# Patient Record
Sex: MALE | Race: WHITE | NOT HISPANIC OR LATINO | URBAN - METROPOLITAN AREA
[De-identification: names, ages, dates, MRNs, and addresses within clinical notes are randomized per-mention and may not be internally consistent; named-entity substitution may affect disease eponyms.]

---

## 2017-11-19 ENCOUNTER — INPATIENT (INPATIENT)
Facility: HOSPITAL | Age: 42
LOS: 2 days | Discharge: HOME | DRG: 440 | End: 2017-11-22
Attending: HOSPITALIST | Admitting: HOSPITALIST
Payer: COMMERCIAL

## 2017-11-19 VITALS
OXYGEN SATURATION: 100 % | SYSTOLIC BLOOD PRESSURE: 125 MMHG | HEART RATE: 61 BPM | RESPIRATION RATE: 20 BRPM | TEMPERATURE: 98 F | DIASTOLIC BLOOD PRESSURE: 94 MMHG

## 2017-11-19 DIAGNOSIS — K85.90 ACUTE PANCREATITIS WITHOUT NECROSIS OR INFECTION, UNSPECIFIED: ICD-10-CM

## 2017-11-19 LAB
ALBUMIN SERPL ELPH-MCNC: 3.9 G/DL — SIGNIFICANT CHANGE UP (ref 3.5–5)
ALP SERPL-CCNC: 70 U/L — SIGNIFICANT CHANGE UP (ref 40–120)
ALT FLD-CCNC: 66 U/L DA — HIGH (ref 10–60)
ANION GAP SERPL CALC-SCNC: 9 MMOL/L — SIGNIFICANT CHANGE UP (ref 5–17)
AST SERPL-CCNC: 31 U/L — SIGNIFICANT CHANGE UP (ref 10–40)
BILIRUB SERPL-MCNC: 0.5 MG/DL — SIGNIFICANT CHANGE UP (ref 0.2–1.2)
BUN SERPL-MCNC: 10 MG/DL — SIGNIFICANT CHANGE UP (ref 7–18)
CALCIUM SERPL-MCNC: 9 MG/DL — SIGNIFICANT CHANGE UP (ref 8.4–10.5)
CHLORIDE SERPL-SCNC: 106 MMOL/L — SIGNIFICANT CHANGE UP (ref 96–108)
CO2 SERPL-SCNC: 26 MMOL/L — SIGNIFICANT CHANGE UP (ref 22–31)
CREAT SERPL-MCNC: 0.98 MG/DL — SIGNIFICANT CHANGE UP (ref 0.5–1.3)
GLUCOSE SERPL-MCNC: 103 MG/DL — HIGH (ref 70–99)
HCT VFR BLD CALC: 55.2 % — HIGH (ref 39–50)
HGB BLD-MCNC: 18.1 G/DL — HIGH (ref 13–17)
LIDOCAIN IGE QN: HIGH U/L (ref 73–393)
LYMPHOCYTES # BLD AUTO: 13 % — SIGNIFICANT CHANGE UP (ref 13–44)
LYMPHOCYTES # SPEC AUTO: 1 % — HIGH (ref 0–0)
MCHC RBC-ENTMCNC: 28.6 PG — SIGNIFICANT CHANGE UP (ref 27–34)
MCHC RBC-ENTMCNC: 32.8 GM/DL — SIGNIFICANT CHANGE UP (ref 32–36)
MCV RBC AUTO: 87.1 FL — SIGNIFICANT CHANGE UP (ref 80–100)
MONOCYTES NFR BLD AUTO: 2 % — SIGNIFICANT CHANGE UP (ref 2–14)
NEUTROPHILS NFR BLD AUTO: 78 % — HIGH (ref 43–77)
NEUTS BAND # BLD: 3 % — SIGNIFICANT CHANGE UP (ref 0–8)
PLAT MORPH BLD: NORMAL — SIGNIFICANT CHANGE UP
PLATELET # BLD AUTO: 275 K/UL — SIGNIFICANT CHANGE UP (ref 150–400)
POTASSIUM SERPL-MCNC: 4.1 MMOL/L — SIGNIFICANT CHANGE UP (ref 3.5–5.3)
POTASSIUM SERPL-SCNC: 4.1 MMOL/L — SIGNIFICANT CHANGE UP (ref 3.5–5.3)
PROT SERPL-MCNC: 7.4 G/DL — SIGNIFICANT CHANGE UP (ref 6–8.3)
RBC # BLD: 6.34 M/UL — HIGH (ref 4.2–5.8)
RBC # FLD: 14.6 % — HIGH (ref 10.3–14.5)
RBC BLD AUTO: NORMAL — SIGNIFICANT CHANGE UP
SODIUM SERPL-SCNC: 141 MMOL/L — SIGNIFICANT CHANGE UP (ref 135–145)
VARIANT LYMPHS # BLD: 3 % — SIGNIFICANT CHANGE UP (ref 0–6)
WBC # BLD: 18.1 K/UL — HIGH (ref 3.8–10.5)
WBC # FLD AUTO: 18.1 K/UL — HIGH (ref 3.8–10.5)

## 2017-11-19 PROCEDURE — 71020: CPT | Mod: 26

## 2017-11-19 PROCEDURE — 74177 CT ABD & PELVIS W/CONTRAST: CPT | Mod: 26

## 2017-11-19 PROCEDURE — 99285 EMERGENCY DEPT VISIT HI MDM: CPT

## 2017-11-19 RX ORDER — HYDROMORPHONE HYDROCHLORIDE 2 MG/ML
1 INJECTION INTRAMUSCULAR; INTRAVENOUS; SUBCUTANEOUS ONCE
Qty: 0 | Refills: 0 | Status: DISCONTINUED | OUTPATIENT
Start: 2017-11-19 | End: 2017-11-19

## 2017-11-19 RX ORDER — SODIUM CHLORIDE 9 MG/ML
1000 INJECTION INTRAMUSCULAR; INTRAVENOUS; SUBCUTANEOUS ONCE
Qty: 0 | Refills: 0 | Status: COMPLETED | OUTPATIENT
Start: 2017-11-19 | End: 2017-11-20

## 2017-11-19 RX ORDER — SODIUM CHLORIDE 9 MG/ML
2000 INJECTION INTRAMUSCULAR; INTRAVENOUS; SUBCUTANEOUS ONCE
Qty: 0 | Refills: 0 | Status: COMPLETED | OUTPATIENT
Start: 2017-11-19 | End: 2017-11-19

## 2017-11-19 RX ADMIN — HYDROMORPHONE HYDROCHLORIDE 1 MILLIGRAM(S): 2 INJECTION INTRAMUSCULAR; INTRAVENOUS; SUBCUTANEOUS at 20:33

## 2017-11-19 RX ADMIN — SODIUM CHLORIDE 2000 MILLILITER(S): 9 INJECTION INTRAMUSCULAR; INTRAVENOUS; SUBCUTANEOUS at 20:26

## 2017-11-19 RX ADMIN — HYDROMORPHONE HYDROCHLORIDE 1 MILLIGRAM(S): 2 INJECTION INTRAMUSCULAR; INTRAVENOUS; SUBCUTANEOUS at 21:40

## 2017-11-19 RX ADMIN — HYDROMORPHONE HYDROCHLORIDE 1 MILLIGRAM(S): 2 INJECTION INTRAMUSCULAR; INTRAVENOUS; SUBCUTANEOUS at 23:44

## 2017-11-19 RX ADMIN — HYDROMORPHONE HYDROCHLORIDE 1 MILLIGRAM(S): 2 INJECTION INTRAMUSCULAR; INTRAVENOUS; SUBCUTANEOUS at 20:25

## 2017-11-19 RX ADMIN — HYDROMORPHONE HYDROCHLORIDE 1 MILLIGRAM(S): 2 INJECTION INTRAMUSCULAR; INTRAVENOUS; SUBCUTANEOUS at 21:08

## 2017-11-19 NOTE — ED PROVIDER NOTE - CARE PLAN
Principal Discharge DX:	Acute pancreatitis without infection or necrosis, unspecified pancreatitis type

## 2017-11-19 NOTE — ED PROVIDER NOTE - OBJECTIVE STATEMENT
43 y/o male with PMHx of asthma, anxiety, acute pancreatitis presents to the ED c/o abd pain x today. Pt notes he has been having pancreatitis attacks for the past 10 years now, last episode x 3 years. Pt notes he had a light attack this morning while eating, causing him mid abd pain radiating to the back. Pt denies fever, chills, or any other complaints. Last CT scan x 3 years. NKDA.

## 2017-11-20 ENCOUNTER — TRANSCRIPTION ENCOUNTER (OUTPATIENT)
Age: 42
End: 2017-11-20

## 2017-11-20 DIAGNOSIS — K85.90 ACUTE PANCREATITIS WITHOUT NECROSIS OR INFECTION, UNSPECIFIED: ICD-10-CM

## 2017-11-20 DIAGNOSIS — Z29.9 ENCOUNTER FOR PROPHYLACTIC MEASURES, UNSPECIFIED: ICD-10-CM

## 2017-11-20 DIAGNOSIS — F17.200 NICOTINE DEPENDENCE, UNSPECIFIED, UNCOMPLICATED: ICD-10-CM

## 2017-11-20 DIAGNOSIS — M06.9 RHEUMATOID ARTHRITIS, UNSPECIFIED: ICD-10-CM

## 2017-11-20 LAB
24R-OH-CALCIDIOL SERPL-MCNC: 21.7 NG/ML — LOW (ref 30–80)
ALBUMIN SERPL ELPH-MCNC: 3.4 G/DL — LOW (ref 3.5–5)
ALP SERPL-CCNC: 57 U/L — SIGNIFICANT CHANGE UP (ref 40–120)
ALT FLD-CCNC: 47 U/L DA — SIGNIFICANT CHANGE UP (ref 10–60)
AMPHET UR-MCNC: NEGATIVE — SIGNIFICANT CHANGE UP
ANION GAP SERPL CALC-SCNC: 11 MMOL/L — SIGNIFICANT CHANGE UP (ref 5–17)
APPEARANCE UR: CLEAR — SIGNIFICANT CHANGE UP
AST SERPL-CCNC: 19 U/L — SIGNIFICANT CHANGE UP (ref 10–40)
BARBITURATES UR SCN-MCNC: NEGATIVE — SIGNIFICANT CHANGE UP
BASOPHILS # BLD AUTO: 0.1 K/UL — SIGNIFICANT CHANGE UP (ref 0–0.2)
BASOPHILS NFR BLD AUTO: 0.7 % — SIGNIFICANT CHANGE UP (ref 0–2)
BENZODIAZ UR-MCNC: POSITIVE
BILIRUB SERPL-MCNC: 0.6 MG/DL — SIGNIFICANT CHANGE UP (ref 0.2–1.2)
BILIRUB UR-MCNC: NEGATIVE — SIGNIFICANT CHANGE UP
BUN SERPL-MCNC: 10 MG/DL — SIGNIFICANT CHANGE UP (ref 7–18)
CALCIUM SERPL-MCNC: 8.1 MG/DL — LOW (ref 8.4–10.5)
CHLORIDE SERPL-SCNC: 108 MMOL/L — SIGNIFICANT CHANGE UP (ref 96–108)
CHOLEST SERPL-MCNC: 147 MG/DL — SIGNIFICANT CHANGE UP (ref 10–199)
CO2 SERPL-SCNC: 22 MMOL/L — SIGNIFICANT CHANGE UP (ref 22–31)
COCAINE METAB.OTHER UR-MCNC: NEGATIVE — SIGNIFICANT CHANGE UP
COLOR SPEC: YELLOW — SIGNIFICANT CHANGE UP
CREAT SERPL-MCNC: 0.83 MG/DL — SIGNIFICANT CHANGE UP (ref 0.5–1.3)
DIFF PNL FLD: ABNORMAL
EOSINOPHIL # BLD AUTO: 0.1 K/UL — SIGNIFICANT CHANGE UP (ref 0–0.5)
EOSINOPHIL NFR BLD AUTO: 0.7 % — SIGNIFICANT CHANGE UP (ref 0–6)
EPI CELLS # UR: ABNORMAL /HPF
FOLATE SERPL-MCNC: 9.8 NG/ML — SIGNIFICANT CHANGE UP (ref 4.8–24.2)
GLUCOSE SERPL-MCNC: 91 MG/DL — SIGNIFICANT CHANGE UP (ref 70–99)
GLUCOSE UR QL: NEGATIVE — SIGNIFICANT CHANGE UP
HBA1C BLD-MCNC: 5.6 % — SIGNIFICANT CHANGE UP (ref 4–5.6)
HCT VFR BLD CALC: 45 % — SIGNIFICANT CHANGE UP (ref 39–50)
HDLC SERPL-MCNC: 34 MG/DL — LOW (ref 40–125)
HGB BLD-MCNC: 15.4 G/DL — SIGNIFICANT CHANGE UP (ref 13–17)
INR BLD: 1.06 RATIO — SIGNIFICANT CHANGE UP (ref 0.88–1.16)
KETONES UR-MCNC: NEGATIVE — SIGNIFICANT CHANGE UP
LEUKOCYTE ESTERASE UR-ACNC: NEGATIVE — SIGNIFICANT CHANGE UP
LIDOCAIN IGE QN: 4257 U/L — HIGH (ref 73–393)
LIPID PNL WITH DIRECT LDL SERPL: 95 MG/DL — SIGNIFICANT CHANGE UP
LYMPHOCYTES # BLD AUTO: 19.4 % — SIGNIFICANT CHANGE UP (ref 13–44)
LYMPHOCYTES # BLD AUTO: 2.7 K/UL — SIGNIFICANT CHANGE UP (ref 1–3.3)
MAGNESIUM SERPL-MCNC: 2.2 MG/DL — SIGNIFICANT CHANGE UP (ref 1.6–2.6)
MCHC RBC-ENTMCNC: 30.4 PG — SIGNIFICANT CHANGE UP (ref 27–34)
MCHC RBC-ENTMCNC: 34.2 GM/DL — SIGNIFICANT CHANGE UP (ref 32–36)
MCV RBC AUTO: 88.9 FL — SIGNIFICANT CHANGE UP (ref 80–100)
METHADONE UR-MCNC: NEGATIVE — SIGNIFICANT CHANGE UP
MONOCYTES # BLD AUTO: 0.8 K/UL — SIGNIFICANT CHANGE UP (ref 0–0.9)
MONOCYTES NFR BLD AUTO: 5.8 % — SIGNIFICANT CHANGE UP (ref 2–14)
NEUTROPHILS # BLD AUTO: 10.1 K/UL — HIGH (ref 1.8–7.4)
NEUTROPHILS NFR BLD AUTO: 73.4 % — SIGNIFICANT CHANGE UP (ref 43–77)
NITRITE UR-MCNC: NEGATIVE — SIGNIFICANT CHANGE UP
OPIATES UR-MCNC: POSITIVE
PCP SPEC-MCNC: SIGNIFICANT CHANGE UP
PCP UR-MCNC: NEGATIVE — SIGNIFICANT CHANGE UP
PH UR: 7 — SIGNIFICANT CHANGE UP (ref 5–8)
PHOSPHATE SERPL-MCNC: 2.6 MG/DL — SIGNIFICANT CHANGE UP (ref 2.5–4.5)
PLATELET # BLD AUTO: 223 K/UL — SIGNIFICANT CHANGE UP (ref 150–400)
POTASSIUM SERPL-MCNC: 3.9 MMOL/L — SIGNIFICANT CHANGE UP (ref 3.5–5.3)
POTASSIUM SERPL-SCNC: 3.9 MMOL/L — SIGNIFICANT CHANGE UP (ref 3.5–5.3)
PROT SERPL-MCNC: 6.3 G/DL — SIGNIFICANT CHANGE UP (ref 6–8.3)
PROT UR-MCNC: 30 MG/DL
PROTHROM AB SERPL-ACNC: 11.6 SEC — SIGNIFICANT CHANGE UP (ref 9.8–12.7)
RBC # BLD: 5.06 M/UL — SIGNIFICANT CHANGE UP (ref 4.2–5.8)
RBC # FLD: 14.8 % — HIGH (ref 10.3–14.5)
RBC CASTS # UR COMP ASSIST: ABNORMAL /HPF (ref 0–2)
SODIUM SERPL-SCNC: 141 MMOL/L — SIGNIFICANT CHANGE UP (ref 135–145)
SP GR SPEC: 1 — LOW (ref 1.01–1.02)
THC UR QL: NEGATIVE — SIGNIFICANT CHANGE UP
TOTAL CHOLESTEROL/HDL RATIO MEASUREMENT: 4.3 RATIO — SIGNIFICANT CHANGE UP (ref 3.4–9.6)
TRIGL SERPL-MCNC: 88 MG/DL — SIGNIFICANT CHANGE UP (ref 10–149)
TSH SERPL-MCNC: 0.53 UU/ML — SIGNIFICANT CHANGE UP (ref 0.34–4.82)
UROBILINOGEN FLD QL: NEGATIVE — SIGNIFICANT CHANGE UP
VIT B12 SERPL-MCNC: 776 PG/ML — SIGNIFICANT CHANGE UP (ref 243–894)
WBC # BLD: 13.8 K/UL — HIGH (ref 3.8–10.5)
WBC # FLD AUTO: 13.8 K/UL — HIGH (ref 3.8–10.5)
WBC UR QL: SIGNIFICANT CHANGE UP /HPF (ref 0–5)

## 2017-11-20 PROCEDURE — 93010 ELECTROCARDIOGRAM REPORT: CPT

## 2017-11-20 PROCEDURE — 99222 1ST HOSP IP/OBS MODERATE 55: CPT

## 2017-11-20 PROCEDURE — 99233 SBSQ HOSP IP/OBS HIGH 50: CPT | Mod: GC

## 2017-11-20 RX ORDER — KETOROLAC TROMETHAMINE 30 MG/ML
30 SYRINGE (ML) INJECTION EVERY 4 HOURS
Qty: 0 | Refills: 0 | Status: DISCONTINUED | OUTPATIENT
Start: 2017-11-20 | End: 2017-11-20

## 2017-11-20 RX ORDER — MORPHINE SULFATE 50 MG/1
2 CAPSULE, EXTENDED RELEASE ORAL ONCE
Qty: 0 | Refills: 0 | Status: DISCONTINUED | OUTPATIENT
Start: 2017-11-20 | End: 2017-11-20

## 2017-11-20 RX ORDER — SODIUM CHLORIDE 9 MG/ML
1000 INJECTION, SOLUTION INTRAVENOUS
Qty: 0 | Refills: 0 | Status: DISCONTINUED | OUTPATIENT
Start: 2017-11-20 | End: 2017-11-20

## 2017-11-20 RX ORDER — SENNA PLUS 8.6 MG/1
2 TABLET ORAL AT BEDTIME
Qty: 0 | Refills: 0 | Status: DISCONTINUED | OUTPATIENT
Start: 2017-11-20 | End: 2017-11-22

## 2017-11-20 RX ORDER — DOCUSATE SODIUM 100 MG
100 CAPSULE ORAL
Qty: 0 | Refills: 0 | Status: DISCONTINUED | OUTPATIENT
Start: 2017-11-20 | End: 2017-11-22

## 2017-11-20 RX ORDER — MORPHINE SULFATE 50 MG/1
2 CAPSULE, EXTENDED RELEASE ORAL EVERY 4 HOURS
Qty: 0 | Refills: 0 | Status: DISCONTINUED | OUTPATIENT
Start: 2017-11-20 | End: 2017-11-22

## 2017-11-20 RX ORDER — KETOROLAC TROMETHAMINE 30 MG/ML
15 SYRINGE (ML) INJECTION EVERY 4 HOURS
Qty: 0 | Refills: 0 | Status: DISCONTINUED | OUTPATIENT
Start: 2017-11-20 | End: 2017-11-20

## 2017-11-20 RX ORDER — ONDANSETRON 8 MG/1
4 TABLET, FILM COATED ORAL EVERY 4 HOURS
Qty: 0 | Refills: 0 | Status: DISCONTINUED | OUTPATIENT
Start: 2017-11-20 | End: 2017-11-22

## 2017-11-20 RX ORDER — PANTOPRAZOLE SODIUM 20 MG/1
40 TABLET, DELAYED RELEASE ORAL DAILY
Qty: 0 | Refills: 0 | Status: DISCONTINUED | OUTPATIENT
Start: 2017-11-20 | End: 2017-11-20

## 2017-11-20 RX ORDER — DEXTROSE MONOHYDRATE, SODIUM CHLORIDE, AND POTASSIUM CHLORIDE 50; .745; 4.5 G/1000ML; G/1000ML; G/1000ML
1000 INJECTION, SOLUTION INTRAVENOUS
Qty: 0 | Refills: 0 | Status: DISCONTINUED | OUTPATIENT
Start: 2017-11-20 | End: 2017-11-20

## 2017-11-20 RX ORDER — FAMOTIDINE 10 MG/ML
20 INJECTION INTRAVENOUS
Qty: 0 | Refills: 0 | Status: DISCONTINUED | OUTPATIENT
Start: 2017-11-20 | End: 2017-11-22

## 2017-11-20 RX ORDER — HYDROMORPHONE HYDROCHLORIDE 2 MG/ML
0.5 INJECTION INTRAMUSCULAR; INTRAVENOUS; SUBCUTANEOUS EVERY 12 HOURS
Qty: 0 | Refills: 0 | Status: DISCONTINUED | OUTPATIENT
Start: 2017-11-20 | End: 2017-11-22

## 2017-11-20 RX ORDER — SODIUM CHLORIDE 9 MG/ML
1000 INJECTION, SOLUTION INTRAVENOUS
Qty: 0 | Refills: 0 | Status: DISCONTINUED | OUTPATIENT
Start: 2017-11-20 | End: 2017-11-22

## 2017-11-20 RX ORDER — KETOROLAC TROMETHAMINE 30 MG/ML
30 SYRINGE (ML) INJECTION EVERY 4 HOURS
Qty: 0 | Refills: 0 | Status: DISCONTINUED | OUTPATIENT
Start: 2017-11-20 | End: 2017-11-22

## 2017-11-20 RX ORDER — NICOTINE POLACRILEX 2 MG
1 GUM BUCCAL DAILY
Qty: 0 | Refills: 0 | Status: DISCONTINUED | OUTPATIENT
Start: 2017-11-20 | End: 2017-11-22

## 2017-11-20 RX ADMIN — ONDANSETRON 4 MILLIGRAM(S): 8 TABLET, FILM COATED ORAL at 22:25

## 2017-11-20 RX ADMIN — MORPHINE SULFATE 2 MILLIGRAM(S): 50 CAPSULE, EXTENDED RELEASE ORAL at 16:13

## 2017-11-20 RX ADMIN — SODIUM CHLORIDE 100 MILLILITER(S): 9 INJECTION, SOLUTION INTRAVENOUS at 05:16

## 2017-11-20 RX ADMIN — Medication 30 MILLIGRAM(S): at 18:22

## 2017-11-20 RX ADMIN — SENNA PLUS 2 TABLET(S): 8.6 TABLET ORAL at 22:22

## 2017-11-20 RX ADMIN — Medication 100 MILLIGRAM(S): at 18:44

## 2017-11-20 RX ADMIN — Medication 30 MILLIGRAM(S): at 12:10

## 2017-11-20 RX ADMIN — MORPHINE SULFATE 2 MILLIGRAM(S): 50 CAPSULE, EXTENDED RELEASE ORAL at 22:22

## 2017-11-20 RX ADMIN — ONDANSETRON 4 MILLIGRAM(S): 8 TABLET, FILM COATED ORAL at 16:13

## 2017-11-20 RX ADMIN — Medication 15 MILLIGRAM(S): at 03:04

## 2017-11-20 RX ADMIN — ONDANSETRON 4 MILLIGRAM(S): 8 TABLET, FILM COATED ORAL at 02:34

## 2017-11-20 RX ADMIN — SODIUM CHLORIDE 200 MILLILITER(S): 9 INJECTION, SOLUTION INTRAVENOUS at 18:17

## 2017-11-20 RX ADMIN — SODIUM CHLORIDE 1000 MILLILITER(S): 9 INJECTION INTRAMUSCULAR; INTRAVENOUS; SUBCUTANEOUS at 00:34

## 2017-11-20 RX ADMIN — MORPHINE SULFATE 2 MILLIGRAM(S): 50 CAPSULE, EXTENDED RELEASE ORAL at 04:32

## 2017-11-20 RX ADMIN — Medication 30 MILLIGRAM(S): at 06:02

## 2017-11-20 RX ADMIN — Medication 30 MILLIGRAM(S): at 06:32

## 2017-11-20 RX ADMIN — HYDROMORPHONE HYDROCHLORIDE 1 MILLIGRAM(S): 2 INJECTION INTRAMUSCULAR; INTRAVENOUS; SUBCUTANEOUS at 00:09

## 2017-11-20 RX ADMIN — MORPHINE SULFATE 2 MILLIGRAM(S): 50 CAPSULE, EXTENDED RELEASE ORAL at 18:16

## 2017-11-20 RX ADMIN — MORPHINE SULFATE 2 MILLIGRAM(S): 50 CAPSULE, EXTENDED RELEASE ORAL at 09:39

## 2017-11-20 RX ADMIN — Medication 30 MILLIGRAM(S): at 18:52

## 2017-11-20 RX ADMIN — FAMOTIDINE 20 MILLIGRAM(S): 10 INJECTION INTRAVENOUS at 18:17

## 2017-11-20 RX ADMIN — MORPHINE SULFATE 2 MILLIGRAM(S): 50 CAPSULE, EXTENDED RELEASE ORAL at 11:43

## 2017-11-20 RX ADMIN — ONDANSETRON 4 MILLIGRAM(S): 8 TABLET, FILM COATED ORAL at 09:39

## 2017-11-20 RX ADMIN — MORPHINE SULFATE 2 MILLIGRAM(S): 50 CAPSULE, EXTENDED RELEASE ORAL at 22:52

## 2017-11-20 RX ADMIN — MORPHINE SULFATE 2 MILLIGRAM(S): 50 CAPSULE, EXTENDED RELEASE ORAL at 04:02

## 2017-11-20 RX ADMIN — Medication 15 MILLIGRAM(S): at 02:34

## 2017-11-20 RX ADMIN — DEXTROSE MONOHYDRATE, SODIUM CHLORIDE, AND POTASSIUM CHLORIDE 100 MILLILITER(S): 50; .745; 4.5 INJECTION, SOLUTION INTRAVENOUS at 03:00

## 2017-11-20 RX ADMIN — Medication 30 MILLIGRAM(S): at 13:00

## 2017-11-20 NOTE — H&P ADULT - PROBLEM SELECTOR PLAN 2
Improve score is 1, will give lovenox   No need for GI ppx Improve score is 1, will give Lovenox   No need for GI ppx Current every day smoker, not wiling to quit   Will give the patch Current every day smoker, not wiling to quit   Will give the patch  UTOX + for Benzodiazepine, opiate

## 2017-11-20 NOTE — DISCHARGE NOTE ADULT - PLAN OF CARE
cessation You were counselled on smoking cessation and placed on nicotine patch. Please continue to avoid smoking resolution and prevention of future episodes Please follow up with GI for monitoring for resolution of the pancreatitis. You will need endoscopic US and ERCP after resolution to evaluate for etiology. Please make appointment with Dr Linder within 2 weeks. resolution and prevention of flares Please avoid methotrexate as it has been shown to remotely cause pancreatitis. Follow up with PMD and rheumatology for adjusting the medications

## 2017-11-20 NOTE — H&P ADULT - HISTORY OF PRESENT ILLNESS
41 y/o male,  with PMHx of asthma, anxiety, acute pancreatitis, s/p cholecystectomy, alcohol abuse in the past (states he has not used in over 15 years)  presents to the ED c/o abd pain x today. Pt notes he has been having pancreatitis attacks for the past 10 years now, last episode x 3 years. Pt notes he had a light attack this morning while eating, causing him mid abd pain radiating to the back. Pt denies fever, chills, or any other complaints. Last CT scan x 3 years. 43 y/o male,  with PMHx of asthma/ COPD, current active smoker, anxiety ( occasional take xanax) , acute pancreatitis ( fatty foods likely), s/p cholecystectomy, alcohol abuse in the past (states he has not used in over 15 years)  presents to the ED c/o mid epigastric abdominal  pain x today. Patient notes that he has been having pancreatitis attacks for the past 10 years now, last episode x 3 years. Patient notes he had a light attack this morning while eating, causing him mid abd pain radiating to the back, associated with nausea. . Pt denies fever, chills, urinary or bowel complaints.     No pertinent Fhx 41 y/o male,  with PMHx of asthma/ COPD, Rheumatoid arthritis , current active smoker, anxiety ( occasional take xanax) , acute pancreatitis ( fatty foods likely), s/p cholecystectomy, alcohol abuse in the past (states he has not used in over 15 years)  presents to the ED c/o mid epigastric abdominal  pain x today. Patient notes that he has been having pancreatitis attacks for the past 10 years now, last episode x 3 years. Patient notes he had a light attack this morning while eating, causing him mid abd pain radiating to the back, associated with nausea. . Pt denies fever, chills, urinary or bowel complaints.     No pertinent Fhx 43 y/o male,  with PMHx of asthma/ COPD, Rheumatoid arthritis , current active smoker, anxiety ( occasional take xanax) , acute pancreatitis ( fatty foods likely), s/p cholecystectomy, alcohol abuse in the past (states he has not used in over 15 years)  presents to the ED c/o mid epigastric abdominal  pain x today. Patient notes that he has been having pancreatitis attacks for the past 10 years now, last episode x 3 years. Patient notes he had a light attack this morning while eating, causing him mid abd pain radiating to the back, associated with nausea. Pt denies fever, chills, urinary or bowel complaints.     No pertinent Fhx

## 2017-11-20 NOTE — H&P ADULT - PROBLEM SELECTOR PLAN 3
On methotrexate 15 mg daily at home ( patient unsure of dose), will call his pharmacy in am , also take leucovorin at home   Primary team to confirm med

## 2017-11-20 NOTE — H&P ADULT - GASTROINTESTINAL DETAILS
soft/no organomegaly/no rebound tenderness/no masses palpable/no guarding/no rigidity/bowel sounds normal/no bruit/normal/no distention

## 2017-11-20 NOTE — H&P ADULT - NSHPLABSRESULTS_GEN_ALL_CORE
18.1   18.1  )-----------( 275      ( 19 Nov 2017 20:30 )             55.2   11-19    141  |  106  |  10  ----------------------------<  103<H>  4.1   |  26  |  0.98    Ca    9.0      19 Nov 2017 20:30    TPro  7.4  /  Alb  3.9  /  TBili  0.5  /  DBili  x   /  AST  31  /  ALT  66<H>  /  AlkPhos  70  11-19  < from: CT Abdomen and Pelvis w/ Oral Cont and w/ IV Cont (11.19.17 @ 23:16) >    IMPRESSION:   Mild acute pancreatitis without complication.      Vital Signs Last 24 Hrs  T(C): 36.8 (20 Nov 2017 00:24), Max: 36.8 (20 Nov 2017 00:24)  T(F): 98.2 (20 Nov 2017 00:24), Max: 98.2 (20 Nov 2017 00:24)  HR: 73 (20 Nov 2017 00:24) (61 - 73)  BP: 139/79 (20 Nov 2017 00:24) (125/94 - 139/79)  BP(mean): --  RR: 20 (19 Nov 2017 18:24) (20 - 20)  SpO2: 100% (19 Nov 2017 18:24) (100% - 100%)    ECG: NSR at 76 bpm, 18.1   18.1  )-----------( 275      ( 19 Nov 2017 20:30 )             55.2   11-19    141  |  106  |  10  ----------------------------<  103<H>  4.1   |  26  |  0.98    Ca    9.0      19 Nov 2017 20:30    TPro  7.4  /  Alb  3.9  /  TBili  0.5  /  DBili  x   /  AST  31  /  ALT  66<H>  /  AlkPhos  70  11-19  < from: CT Abdomen and Pelvis w/ Oral Cont and w/ IV Cont (11.19.17 @ 23:16) >      IMPRESSION:   Mild acute pancreatitis without complication.      Vital Signs Last 24 Hrs  T(C): 36.8 (20 Nov 2017 00:24), Max: 36.8 (20 Nov 2017 00:24)  T(F): 98.2 (20 Nov 2017 00:24), Max: 98.2 (20 Nov 2017 00:24)  HR: 73 (20 Nov 2017 00:24) (61 - 73)  BP: 139/79 (20 Nov 2017 00:24) (125/94 - 139/79)  BP(mean): --  RR: 20 (19 Nov 2017 18:24) (20 - 20)  SpO2: 100% (19 Nov 2017 18:24) (100% - 100%)    ECG: NSR at 76 bpm, 18.1   18.1  )-----------( 275      ( 19 Nov 2017 20:30 )             55.2   11-19    141  |  106  |  10  ----------------------------<  103<H>  4.1   |  26  |  0.98    Ca    9.0      19 Nov 2017 20:30    TPro  7.4  /  Alb  3.9  /  TBili  0.5  /  DBili  x   /  AST  31  /  ALT  66<H>  /  AlkPhos  70  11-19  < from: CT Abdomen and Pelvis w/ Oral Cont and w/ IV Cont (11.19.17 @ 23:16) >      IMPRESSION:   Mild acute pancreatitis without complication.      Vital Signs Last 24 Hrs  T(C): 36.8 (20 Nov 2017 00:24), Max: 36.8 (20 Nov 2017 00:24)  T(F): 98.2 (20 Nov 2017 00:24), Max: 98.2 (20 Nov 2017 00:24)  HR: 73 (20 Nov 2017 00:24) (61 - 73)  BP: 139/79 (20 Nov 2017 00:24) (125/94 - 139/79)  BP(mean): --  RR: 20 (19 Nov 2017 18:24) (20 - 20)  SpO2: 100% (19 Nov 2017 18:24) (100% - 100%)    ECG: NSR at 76 bpm

## 2017-11-20 NOTE — DISCHARGE NOTE ADULT - CARE PLAN
Principal Discharge DX:	Acute Pancreatitis  Goal:	resolution and prevention of future episodes  Instructions for follow-up, activity and diet:	Please follow up with GI for monitoring for resolution of the pancreatitis. You will need endoscopic US and ERCP after resolution to evaluate for etiology. Please make appointment with Dr Linder within 2 weeks.  Secondary Diagnosis:	Rheumatoid arthritis  Goal:	resolution and prevention of flares  Instructions for follow-up, activity and diet:	Please avoid methotrexate as it has been shown to remotely cause pancreatitis. Follow up with PMD and rheumatology for adjusting the medications  Secondary Diagnosis:	Smoker  Goal:	cessation  Instructions for follow-up, activity and diet:	You were counselled on smoking cessation and placed on nicotine patch. Please continue to avoid smoking

## 2017-11-20 NOTE — CONSULT NOTE ADULT - ASSESSMENT
42 year old amel with pancreatitis:    Plan:  IVF LR @ 200cc /hour   Monitor BUN/ Creatnine as a surrogate of adequate hydration    Advance  diet as pain improves   Tobacco cessation counselling done   Although Methotrexate rarely causes Drug indices pancreatic  it may be acting synergistically with the patient's tob use. Suggest holding the meds for now and considering an alternative treatment for his RA.

## 2017-11-20 NOTE — H&P ADULT - PROBLEM SELECTOR PLAN 1
Mid epigastric abdominal pain, nausea, vomiting  Hx of alcoholic pancreatitis in past ( no recent ETOH intake as per patient)  CT scan with mild acute pancreatitis   Got 3 L bolus, diluadid x 3  Will c/w IVF , NPO, toradol for pain Mid epigastric abdominal pain, nausea, vomiting  Hx of alcoholic pancreatitis in past ( no recent ETOH intake as per patient)  CT scan with mild acute pancreatitis   BISAP score is 0  Got 3 L bolus, Dilaudid x 3  Will c/w IVF , NPO, Toradol for pain Mid epigastric abdominal pain, nausea, vomiting  Hx of alcoholic pancreatitis in past ( no recent ETOH intake as per patient) reports that most likely from fatty foods  CT scan with mild acute pancreatitis   BISAP score is 0  Has leukocytosis to 18, Hb 18, afebrile, lipase 43132,   Got 3 L bolus, Dilaudid x 3  Will c/w IVF , NPO, Toradol for pain, Zofran Mid epigastric abdominal pain, nausea, vomiting  Hx of alcoholic pancreatitis in past ( no recent ETOH intake as per patient) reports that most likely from fatty foods  CT scan with mild acute pancreatitis   BISAP score is 0  Has leukocytosis to 18, Hb 18, afebrile, lipase [id number],   Got 3 L bolus, Dilaudid x 3  Will c/w IVF , NPO, Toradol for pain, Zofran  GI consult Dr Linder

## 2017-11-20 NOTE — PROGRESS NOTE ADULT - SUBJECTIVE AND OBJECTIVE BOX
INTERVAL HPI/ OVERNIGHT EVENTS: no major overnight events     VITAL SIGNS:  T(F): 97.8 (17 @ 14:20)  HR: 66 (17 @ 14:20)  BP: 132/66 (17 @ 14:20)  RR: 16 (17 @ 14:20)  SpO2: 95% (17 @ 14:20)  Wt(kg): --    PHYSICAL EXAM:    Constitutional: NAD   Eyes: PERRL, sclera clear   ENMT:   Neck:  Respiratory:  Cardiovascular:  Gastrointestinal:  Extremities:  Vascular:  Neurological:  Musculoskeletal:    MEDICATIONS  (STANDING):  docusate sodium 100 milliGRAM(s) Oral two times a day  famotidine Injectable 20 milliGRAM(s) IV Push two times a day  lactated ringers 1000 milliLiter(s) (200 mL/Hr) IV Continuous <Continuous>  nicotine -   7 mG/24Hr(s) Patch 1 patch Transdermal daily  senna 2 Tablet(s) Oral at bedtime    MEDICATIONS  (PRN):  HYDROmorphone  Injectable 0.5 milliGRAM(s) IV Push every 12 hours PRN Severe Pain (7 - 10)  ketorolac   Injectable 30 milliGRAM(s) IV Push every 4 hours PRN Mild Pain (1 - 3)  morphine  - Injectable 2 milliGRAM(s) IV Push every 4 hours PRN Moderate Pain (4 - 6)  ondansetron Injectable 4 milliGRAM(s) IV Push every 4 hours PRN Nausea and/or Vomiting      Allergies    No Known Allergies    Intolerances        LABS:                        15.4   13.8  )-----------( 223      ( 2017 07:10 )             45.0     -    141  |  108  |  10  ----------------------------<  91  3.9   |  22  |  0.83    Ca    8.1<L>      2017 07:10  Phos  2.6       Mg     2.2         TPro  6.3  /  Alb  3.4<L>  /  TBili  0.6  /  DBili  x   /  AST  19  /  ALT  47  /  AlkPhos  57  -    PT/INR - ( 2017 07:10 )   PT: 11.6 sec;   INR: 1.06 ratio           Urinalysis Basic - ( 2017 02:49 )    Color: Yellow / Appearance: Clear / S.005 / pH: x  Gluc: x / Ketone: Negative  / Bili: Negative / Urobili: Negative   Blood: x / Protein: 30 mg/dL / Nitrite: Negative   Leuk Esterase: Negative / RBC: 2-5 /HPF / WBC 0-2 /HPF   Sq Epi: x / Non Sq Epi: Occasional /HPF / Bacteria: x        RADIOLOGY & ADDITIONAL TESTS: INTERVAL HPI/ OVERNIGHT EVENTS: no major overnight events     VITAL SIGNS:  T(F): 97.8 (17 @ 14:20)  HR: 66 (17 @ 14:20)  BP: 132/66 (17 @ 14:20)  RR: 16 (17 @ 14:20)  SpO2: 95% (17 @ 14:20)  Wt(kg): --    PHYSICAL EXAM:    Constitutional: NAD   Eyes: PERRL, sclera clear   ENMT: no external lesions   Neck: supple, no JVD   Respiratory: CTAB   Cardiovascular: S1, S2 present, no RMG  Gastrointestinal: soft, upper abdominal tenderness worst in the epigastrium   Extremities: no cyanosis, pedal edema or clubbing   Vascular: pulses 2+b/l       MEDICATIONS  (STANDING):  docusate sodium 100 milliGRAM(s) Oral two times a day  famotidine Injectable 20 milliGRAM(s) IV Push two times a day  lactated ringers 1000 milliLiter(s) (200 mL/Hr) IV Continuous <Continuous>  nicotine -   7 mG/24Hr(s) Patch 1 patch Transdermal daily  senna 2 Tablet(s) Oral at bedtime    MEDICATIONS  (PRN):  HYDROmorphone  Injectable 0.5 milliGRAM(s) IV Push every 12 hours PRN Severe Pain (7 - 10)  ketorolac   Injectable 30 milliGRAM(s) IV Push every 4 hours PRN Mild Pain (1 - 3)  morphine  - Injectable 2 milliGRAM(s) IV Push every 4 hours PRN Moderate Pain (4 - 6)  ondansetron Injectable 4 milliGRAM(s) IV Push every 4 hours PRN Nausea and/or Vomiting      Allergies    No Known Allergies    Intolerances        LABS:                        15.4   13.8  )-----------( 223      ( 2017 07:10 )             45.0     11-20    141  |  108  |  10  ----------------------------<  91  3.9   |  22  |  0.83    Ca    8.1<L>      2017 07:10  Phos  2.6     11-20  Mg     2.2     -20    TPro  6.3  /  Alb  3.4<L>  /  TBili  0.6  /  DBili  x   /  AST  19  /  ALT  47  /  AlkPhos  57  11-20    PT/INR - ( 2017 07:10 )   PT: 11.6 sec;   INR: 1.06 ratio           Urinalysis Basic - ( 2017 02:49 )    Color: Yellow / Appearance: Clear / S.005 / pH: x  Gluc: x / Ketone: Negative  / Bili: Negative / Urobili: Negative   Blood: x / Protein: 30 mg/dL / Nitrite: Negative   Leuk Esterase: Negative / RBC: 2-5 /HPF / WBC 0-2 /HPF   Sq Epi: x / Non Sq Epi: Occasional /HPF / Bacteria: x        RADIOLOGY & ADDITIONAL TESTS:

## 2017-11-20 NOTE — PROGRESS NOTE ADULT - ASSESSMENT
41 y/o male,  with PMHx of  recurrent pancreatitis, asthma/ COPD, Rheumatoid arthritis , current active smoker, anxiety ( occasional take xanax) , s/p cholecystectomy, alcohol abuse in the past (states he has not used in over 15 years)  presents to the ED c/o mid epigastric abdominal  pain  for 1 day. In the ED patient initial vital stable, abdominal tenderness, lipase was elevated, TAG normal, CT scan show pancreatitis, got diluadid x 3 by ED, 3 L bolus, looks dehydrated on exam, wbc 18, hb elevated, afebrile admitted for acute pancreatitis. He had a CT scan with mild acute pancreatitis and leukocytosis to 18, Hb 18, lipase [id number],  for which he got 3 L bolus and was made nil per oral. Toradol for pain and Zofran for nausea was initiated. Nicotine patch was placed for smoking cessation.

## 2017-11-20 NOTE — H&P ADULT - ATTENDING COMMENTS
Patient was seen and examined at bedside, still complaints of pain.  He states multiple episodes of pancreatitis, started 15 years ago, initially had cholecystectomy for gall stone pancreatitis, however he still continued to have the same episodes, usually few years apart. Never did ERCP before.  He was started on Methotrexate 2 months ago, stopped for the last 2 weeks before restarting last week again.   Physical exam: tenderness at the right and left LQ  BS: intact.  Labs: lipase improving.  A/P  1- pancreatitis: Looks like multifactorial  MTX can be contributing, ? duodenitis can be causing further obstruction of the ampulla of Vater.  Denies alcohol, no hyperlipidemia.  2- Rheumatoid arthritis: hold MTX for now as it may be contributing to pancreatitis  3_ GI and dvt prophylaxis.

## 2017-11-20 NOTE — DISCHARGE NOTE ADULT - CARE PROVIDER_API CALL
Blair Gordon), Gastroenterology; Internal Medicine  [address] [place] [address]  Phone: [phone]  Fax: [phone]

## 2017-11-20 NOTE — DISCHARGE NOTE ADULT - MEDICATION SUMMARY - MEDICATIONS TO TAKE
I will START or STAY ON the medications listed below when I get home from the hospital:    Zofran 4 mg oral tablet  -- 1 tab(s) by mouth every 8 hours, As Needed  -- Indication: For Nausea/vomiting     hydrocortisone 0.5% topical ointment  -- 1 application on skin 2 times a day  -- Indication: For Skin rash     senna oral tablet  -- 2 tab(s) by mouth once a day (at bedtime), As needed, Constipation  -- Indication: For constipation     docusate sodium 100 mg oral capsule  -- 1 cap(s) by mouth 3 times a day  -- Indication: For constipation     Protonix 40 mg oral delayed release tablet  -- 1 tab(s) by mouth once a day  -- Indication: For GI ppx     nicotine 7 mg/24 hr transdermal film, extended release  --  by transdermal patch   -- Indication: For Smoking cessation

## 2017-11-20 NOTE — CONSULT NOTE ADULT - SUBJECTIVE AND OBJECTIVE BOX
Patient is a 42y old  Male who presents with a chief complaint of abdominal pain, nausea, vomiting (20 Nov 2017 07:44)  42 year old male with abdominal pain. He states the abdominal pain started yesterday morning at 9:30 am and      REVIEW OF SYSTEMS  Constitutional:   No fever, no fatigue, no pallor, no night sweats, no weight loss.  HEENT:   No eye pain, no vision changes, no icterus, no mouth ulcers.  Respiratory:   No shortness of breath, no cough, no respiratory distress.   Cardiovascular:   No chest pain, no palpitations.   Gastrointestinal: No abdominal pain, no nausea, no vomiting , no diahrrea, no constipation, no hematochezia,no melena.  Skin:   No rashes, no jaundice, no eczema.   Musculoskeletal:   No joint pain, no swelling, no myalgia.   Neurologic:   No headache, no seizure, no weakness.   Genitourinary:   No dysuria, no decreased urine output.  Psychiatric:  No depression, no anxiety,   Endocrine:   No thyroid disease, no diabetes.  Heme/Lymphatic:   No anemia, no blood transfusions, no lymph node enlargement, no bleeding, no bruising.  ___________________________________________________________________________________________  Allergies    No Known Allergies    Intolerances      MEDICATIONS  (STANDING):  docusate sodium 100 milliGRAM(s) Oral two times a day  famotidine Injectable 20 milliGRAM(s) IV Push two times a day  lactated ringers 1000 milliLiter(s) (200 mL/Hr) IV Continuous <Continuous>  nicotine -   7 mG/24Hr(s) Patch 1 patch Transdermal daily  senna 2 Tablet(s) Oral at bedtime    MEDICATIONS  (PRN):  HYDROmorphone  Injectable 0.5 milliGRAM(s) IV Push every 12 hours PRN Severe Pain (7 - 10)  ketorolac   Injectable 30 milliGRAM(s) IV Push every 4 hours PRN Mild Pain (1 - 3)  morphine  - Injectable 2 milliGRAM(s) IV Push every 4 hours PRN Moderate Pain (4 - 6)  ondansetron Injectable 4 milliGRAM(s) IV Push every 4 hours PRN Nausea and/or Vomiting      PAST MEDICAL & SURGICAL HISTORY:  Asthma  Anxiety  Acute Pancreatitis  History of Cholecystectomy    FAMILY HISTORY:    Social History: No hsitory of : Tobacco use, IVDA, EToH  ______________________________________________________________________________________    PHYSICAL EXAM    Daily     Daily   BMI:   Change in Weight:  Vital Signs Last 24 Hrs  T(C): 36.7 (20 Nov 2017 05:48), Max: 36.8 (20 Nov 2017 00:24)  T(F): 98 (20 Nov 2017 05:48), Max: 98.2 (20 Nov 2017 00:24)  HR: 60 (20 Nov 2017 05:48) (60 - 73)  BP: 125/72 (20 Nov 2017 05:48) (125/72 - 154/81)  BP(mean): --  RR: 16 (20 Nov 2017 05:48) (16 - 20)  SpO2: 95% (20 Nov 2017 05:48) (95% - 100%)    General:  Well developed, well nourished, alert and active, no pallor, NAD.  HEENT:    Normal appearance of conjunctiva, ears, nose, lips, oropharynx, and oral mucosa, anicteric.  Neck:  No masses, no asymmetry.  Lymph Nodes:  No lymphadenopathy.   Cardiovascular:  RRR normal S1/S2, no murmur.  Respiratory:  CTA B/L, normal respiratory effort.   Abdominal:   soft, no masses or tenderness, normoactive BS, NT/ND, no HSM.  Extremities:   No clubbing or cyanosis, normal capillary refill, no edema.   Skin:   No rash, jaundice, lesions, eczema.   Musculoskeletal:  No joint swelling, erythema or tenderness.   Neuro: No focal deficits.   Other:   _______________________________________________________________________________________________  Lab Results:                          15.4   13.8  )-----------( 223      ( 20 Nov 2017 07:10 )             45.0     11-20    141  |  108  |  10  ----------------------------<  91  3.9   |  22  |  0.83    Ca    8.1<L>      20 Nov 2017 07:10  Phos  2.6     11-20  Mg     2.2     11-20    TPro  6.3  /  Alb  3.4<L>  /  TBili  0.6  /  DBili  x   /  AST  19  /  ALT  47  /  AlkPhos  57  11-20    LIVER FUNCTIONS - ( 20 Nov 2017 07:10 )  Alb: 3.4 g/dL / Pro: 6.3 g/dL / ALK PHOS: 57 U/L / ALT: 47 U/L DA / AST: 19 U/L / GGT: x           PT/INR - ( 20 Nov 2017 07:10 )   PT: 11.6 sec;   INR: 1.06 ratio           Triglycerides, Serum: 88 mg/dL (11-20 @ 07:10)  Triglycerides, Serum: 115 mg/dL (11-19 @ 20:30)        Stool Results:          RADIOLOGY RESULTS:    SURGICAL PATHOLOGY: Patient is a 42y old  Male who presents with a chief complaint of abdominal pain, nausea, vomiting (20 Nov 2017 07:44)  42 year old male with abdominal pain. He states the abdominal pain started yesterday morning at 9:30 am and starting feeling severe 10 /10 epigastric pain with radiation to the back. He reports the pain is waxing and waning but still presents He states he has had recurrent pancreatitis with no identified cause.   + Tob use. + cholecystectomy in the past + ETOH occasional. On Methotrexate for RA.   REVIEW OF SYSTEMS  Constitutional:   No fever, no fatigue, no pallor, no night sweats, no weight loss.  HEENT:   No eye pain, no vision changes, no icterus, no mouth ulcers.  Respiratory:   No shortness of breath, no cough, no respiratory distress.   Cardiovascular:   No chest pain, no palpitations.   Gastrointestinal: + abdominal pain, no nausea, no vomiting , no diarrhea no constipation, no hematochezia, no melena.  Skin:   No rashes, no jaundice, no eczema.   Musculoskeletal:   No joint pain, no swelling, no myalgia.   Neurologic:   No headache, no seizure, no weakness.   Genitourinary:   No dysuria, no decreased urine output.  Psychiatric:  No depression, no anxiety,   Endocrine:   No thyroid disease, no diabetes.  Heme/Lymphatic:   No anemia, no blood transfusions, no lymph node enlargement, no bleeding, no bruising.  ___________________________________________________________________________________________  Allergies    No Known Allergies    Intolerances      MEDICATIONS  (STANDING):  docusate sodium 100 milliGRAM(s) Oral two times a day  famotidine Injectable 20 milliGRAM(s) IV Push two times a day  lactated ringers 1000 milliLiter(s) (200 mL/Hr) IV Continuous <Continuous>  nicotine -   7 mG/24Hr(s) Patch 1 patch Transdermal daily  senna 2 Tablet(s) Oral at bedtime    MEDICATIONS  (PRN):  HYDROmorphone  Injectable 0.5 milliGRAM(s) IV Push every 12 hours PRN Severe Pain (7 - 10)  ketorolac   Injectable 30 milliGRAM(s) IV Push every 4 hours PRN Mild Pain (1 - 3)  morphine  - Injectable 2 milliGRAM(s) IV Push every 4 hours PRN Moderate Pain (4 - 6)  ondansetron Injectable 4 milliGRAM(s) IV Push every 4 hours PRN Nausea and/or Vomiting      PAST MEDICAL & SURGICAL HISTORY:  Asthma  Anxiety  Acute Pancreatitis  History of Cholecystectomy    FAMILY HISTORY:    Social History: No history of : , IVDA, EToH. +Tobacco use  ______________________________________________________________________________________    PHYSICAL EXAM    Daily     Daily   BMI:   Change in Weight:  Vital Signs Last 24 Hrs  T(C): 36.7 (20 Nov 2017 05:48), Max: 36.8 (20 Nov 2017 00:24)  T(F): 98 (20 Nov 2017 05:48), Max: 98.2 (20 Nov 2017 00:24)  HR: 60 (20 Nov 2017 05:48) (60 - 73)  BP: 125/72 (20 Nov 2017 05:48) (125/72 - 154/81)  BP(mean): --  RR: 16 (20 Nov 2017 05:48) (16 - 20)  SpO2: 95% (20 Nov 2017 05:48) (95% - 100%)    General:  Well developed, well nourished, alert and active, no pallor, NAD.  HEENT:    Normal appearance of conjunctiva, ears, nose, lips, oropharynx, and oral mucosa, anicteric.  Neck:  No masses, no asymmetry.  Lymph Nodes:  No lymphadenopathy.   Cardiovascular:  RRR normal S1/S2, no murmur.  Respiratory:  CTA B/L, normal respiratory effort.   Abdominal:   + abdominal tenderness  at the epigastric region   Extremities:   No clubbing or cyanosis, normal capillary refill, no edema.   Skin:   No rash, jaundice, lesions, eczema.   Musculoskeletal:  No joint swelling, erythema or tenderness.   Neuro: No focal deficits.   Other:   _______________________________________________________________________________________________  Lab Results:                          15.4   13.8  )-----------( 223      ( 20 Nov 2017 07:10 )             45.0     11-20    141  |  108  |  10  ----------------------------<  91  3.9   |  22  |  0.83    Ca    8.1<L>      20 Nov 2017 07:10  Phos  2.6     11-20  Mg     2.2     11-20    TPro  6.3  /  Alb  3.4<L>  /  TBili  0.6  /  DBili  x   /  AST  19  /  ALT  47  /  AlkPhos  57  11-20    LIVER FUNCTIONS - ( 20 Nov 2017 07:10 )  Alb: 3.4 g/dL / Pro: 6.3 g/dL / ALK PHOS: 57 U/L / ALT: 47 U/L DA / AST: 19 U/L / GGT: x           PT/INR - ( 20 Nov 2017 07:10 )   PT: 11.6 sec;   INR: 1.06 ratio           Triglycerides, Serum: 88 mg/dL (11-20 @ 07:10)  Triglycerides, Serum: 115 mg/dL (11-19 @ 20:30)        Stool Results:          RADIOLOGY RESULTS:    SURGICAL PATHOLOGY:

## 2017-11-20 NOTE — H&P ADULT - MUSCULOSKELETAL
detailed exam no joint warmth/normal/no calf tenderness/ROM intact/no joint swelling/no joint erythema/normal strength

## 2017-11-20 NOTE — H&P ADULT - ASSESSMENT
In the ED patient initial vital stable, abdominal tenderness, lipase was elevated, TAG normal, CT scan show pancreatitis, got diluadid x 3 by ED, 3 L bolus, looks dehydrated on exam, wbc 18, hb elevated, afebrile admitted for acute pancreatitis.

## 2017-11-20 NOTE — DISCHARGE NOTE ADULT - PATIENT PORTAL LINK FT
“You can access the [facility] Patient Portal, offered by [facility], by registering with the following website: [email or web address]”

## 2017-11-20 NOTE — DISCHARGE NOTE ADULT - HOSPITAL COURSE
41 y/o male,  with PMHx of  recurrent pancreatitis, asthma/ COPD, Rheumatoid arthritis , current active smoker, anxiety ( occasional take xanax) , s/p cholecystectomy, alcohol abuse in the past (states he has not used in over 15 years)  presents to the ED c/o mid epigastric abdominal  pain  for 1 day. In the ED patient initial vital stable, abdominal tenderness, lipase was elevated, TAG normal, CT scan show pancreatitis, got diluadid x 3 by ED, 3 L bolus, looks dehydrated on exam, wbc 18, hb elevated, afebrile admitted for acute pancreatitis. He had a CT scan with mild acute pancreatitis and leukocytosis to 18, Hb 18, lipase 03032,  for which he got 3 L bolus and was made nil per oral. Toradol for pain and Zofran for nausea was initiated. Nicotine patch was placed for smoking cessation.     As per attending stable for discharge to --- 43 y/o male,  with PMHx of  recurrent pancreatitis, asthma/ COPD, Rheumatoid arthritis , current active smoker, anxiety ( occasional take xanax) , s/p cholecystectomy, alcohol abuse in the past (states he has not used in over 15 years)  presents to the ED c/o mid epigastric abdominal  pain  for 1 day. In the ED patient initial vital stable, abdominal tenderness, lipase was elevated, TAG normal, CT scan show pancreatitis, got diluadid x 3 by ED, 3 L bolus, looks dehydrated on exam, wbc 18, hb elevated, afebrile admitted for acute pancreatitis. He had a CT scan with mild acute pancreatitis and leukocytosis to 18, Hb 18, lipase [id number],  for which he got 3 L bolus and was made nil per oral. Toradol for pain and Zofran for nausea was initiated. Nicotine patch was placed for smoking cessation. GI evaluation was done and patient is supposed to follow up with GI for outpatient EUS and ERCP.     As per attending stable for discharge to  home.

## 2017-11-20 NOTE — H&P ADULT - RS GEN PE MLT RESP DETAILS PC
respirations non-labored/breath sounds equal/no rhonchi/no rales/no subcutaneous emphysema/no intercostal retractions/normal/no chest wall tenderness/good air movement/airway patent/no wheezes/clear to auscultation bilaterally

## 2017-11-21 LAB
ANION GAP SERPL CALC-SCNC: 10 MMOL/L — SIGNIFICANT CHANGE UP (ref 5–17)
BUN SERPL-MCNC: 8 MG/DL — SIGNIFICANT CHANGE UP (ref 7–18)
CALCIUM SERPL-MCNC: 8.5 MG/DL — SIGNIFICANT CHANGE UP (ref 8.4–10.5)
CHLORIDE SERPL-SCNC: 106 MMOL/L — SIGNIFICANT CHANGE UP (ref 96–108)
CO2 SERPL-SCNC: 22 MMOL/L — SIGNIFICANT CHANGE UP (ref 22–31)
CREAT SERPL-MCNC: 0.8 MG/DL — SIGNIFICANT CHANGE UP (ref 0.5–1.3)
GLUCOSE SERPL-MCNC: 72 MG/DL — SIGNIFICANT CHANGE UP (ref 70–99)
HCT VFR BLD CALC: 46.2 % — SIGNIFICANT CHANGE UP (ref 39–50)
HGB BLD-MCNC: 15.6 G/DL — SIGNIFICANT CHANGE UP (ref 13–17)
IGG SERPL-MCNC: 768 MG/DL — SIGNIFICANT CHANGE UP (ref 767–1590)
IGG1 SER-MCNC: 406 MG/DL — SIGNIFICANT CHANGE UP (ref 341–894)
IGG2 SER-MCNC: 285 MG/DL — SIGNIFICANT CHANGE UP (ref 171–632)
IGG3 SER-MCNC: 59 MG/DL — SIGNIFICANT CHANGE UP (ref 18.4–106)
IGG4 SER-MCNC: 22.9 MG/DL — SIGNIFICANT CHANGE UP (ref 2.4–121)
LIDOCAIN IGE QN: 3635 U/L — HIGH (ref 73–393)
MCHC RBC-ENTMCNC: 30.2 PG — SIGNIFICANT CHANGE UP (ref 27–34)
MCHC RBC-ENTMCNC: 33.8 GM/DL — SIGNIFICANT CHANGE UP (ref 32–36)
MCV RBC AUTO: 89.6 FL — SIGNIFICANT CHANGE UP (ref 80–100)
PLATELET # BLD AUTO: 210 K/UL — SIGNIFICANT CHANGE UP (ref 150–400)
POTASSIUM SERPL-MCNC: 4 MMOL/L — SIGNIFICANT CHANGE UP (ref 3.5–5.3)
POTASSIUM SERPL-SCNC: 4 MMOL/L — SIGNIFICANT CHANGE UP (ref 3.5–5.3)
RBC # BLD: 5.15 M/UL — SIGNIFICANT CHANGE UP (ref 4.2–5.8)
RBC # FLD: 14.5 % — SIGNIFICANT CHANGE UP (ref 10.3–14.5)
SODIUM SERPL-SCNC: 138 MMOL/L — SIGNIFICANT CHANGE UP (ref 135–145)
WBC # BLD: 13 K/UL — HIGH (ref 3.8–10.5)
WBC # FLD AUTO: 13 K/UL — HIGH (ref 3.8–10.5)

## 2017-11-21 PROCEDURE — 99233 SBSQ HOSP IP/OBS HIGH 50: CPT | Mod: GC

## 2017-11-21 RX ORDER — SIMETHICONE 80 MG/1
80 TABLET, CHEWABLE ORAL EVERY 6 HOURS
Qty: 0 | Refills: 0 | Status: DISCONTINUED | OUTPATIENT
Start: 2017-11-21 | End: 2017-11-22

## 2017-11-21 RX ORDER — ZOLPIDEM TARTRATE 10 MG/1
5 TABLET ORAL ONCE
Qty: 0 | Refills: 0 | Status: DISCONTINUED | OUTPATIENT
Start: 2017-11-21 | End: 2017-11-21

## 2017-11-21 RX ADMIN — SODIUM CHLORIDE 200 MILLILITER(S): 9 INJECTION, SOLUTION INTRAVENOUS at 00:25

## 2017-11-21 RX ADMIN — Medication 30 MILLIGRAM(S): at 19:39

## 2017-11-21 RX ADMIN — FAMOTIDINE 20 MILLIGRAM(S): 10 INJECTION INTRAVENOUS at 06:23

## 2017-11-21 RX ADMIN — FAMOTIDINE 20 MILLIGRAM(S): 10 INJECTION INTRAVENOUS at 17:59

## 2017-11-21 RX ADMIN — Medication 30 MILLIGRAM(S): at 00:57

## 2017-11-21 RX ADMIN — Medication 30 MILLIGRAM(S): at 12:08

## 2017-11-21 RX ADMIN — Medication 30 MILLIGRAM(S): at 04:57

## 2017-11-21 RX ADMIN — Medication 30 MILLIGRAM(S): at 11:17

## 2017-11-21 RX ADMIN — SIMETHICONE 80 MILLIGRAM(S): 80 TABLET, CHEWABLE ORAL at 08:00

## 2017-11-21 RX ADMIN — ZOLPIDEM TARTRATE 5 MILLIGRAM(S): 10 TABLET ORAL at 22:40

## 2017-11-21 RX ADMIN — MORPHINE SULFATE 2 MILLIGRAM(S): 50 CAPSULE, EXTENDED RELEASE ORAL at 02:57

## 2017-11-21 RX ADMIN — Medication 30 MILLIGRAM(S): at 00:27

## 2017-11-21 RX ADMIN — Medication 30 MILLIGRAM(S): at 20:09

## 2017-11-21 RX ADMIN — MORPHINE SULFATE 2 MILLIGRAM(S): 50 CAPSULE, EXTENDED RELEASE ORAL at 02:27

## 2017-11-21 RX ADMIN — Medication 100 MILLIGRAM(S): at 06:23

## 2017-11-21 RX ADMIN — Medication 30 MILLIGRAM(S): at 04:27

## 2017-11-21 RX ADMIN — SODIUM CHLORIDE 200 MILLILITER(S): 9 INJECTION, SOLUTION INTRAVENOUS at 06:23

## 2017-11-21 NOTE — PROGRESS NOTE ADULT - ASSESSMENT
43 y/o male,  with PMHx of  recurrent pancreatitis, asthma/ COPD, Rheumatoid arthritis , current active smoker, anxiety ( occasional take xanax) , s/p cholecystectomy, alcohol abuse in the past (states he has not used in over 15 years)  presents to the ED c/o mid epigastric abdominal  pain  for 1 day. In the ED patient initial vital stable, abdominal tenderness, lipase was elevated, TAG normal, CT scan show pancreatitis, got diluadid x 3 by ED, 3 L bolus, looks dehydrated on exam, wbc 18, hb elevated, afebrile admitted for acute pancreatitis. He had a CT scan with mild acute pancreatitis and leukocytosis to 18, Hb 18, lipase 15963,  for which he got 3 L bolus and was made nil per oral. Toradol for pain and Zofran for nausea was initiated. Nicotine patch was placed for smoking cessation. 41 y/o male,  with PMHx of  recurrent pancreatitis, asthma/ COPD, Rheumatoid arthritis , current active smoker, anxiety ( occasional take xanax) , s/p cholecystectomy, alcohol abuse in the past (states he has not used in over 15 years)  presents to the ED c/o mid epigastric abdominal  pain  for 1 day. In the ED patient initial vital stable, abdominal tenderness, lipase was elevated, TAG normal, CT scan show pancreatitis, got diluadid x 3 by ED, 3 L bolus, looks dehydrated on exam, wbc 18, hb elevated, afebrile admitted for acute pancreatitis. He had a CT scan with mild acute pancreatitis and leukocytosis to 18, Hb 18, lipase [id number],  for which he got 3 L bolus and was made nil per oral. Toradol for pain and Zofran for nausea was initiated. Nicotine patch was placed for smoking cessation.     As per attending stable for discharge back to NH

## 2017-11-21 NOTE — PROGRESS NOTE ADULT - ATTENDING COMMENTS
Patient seen and examined at bedside, feeling better, still has some pains which he says feels like gas.  The cause of his pancreatitis is unclear, unlikely MTX, but can still be, however pancreatitis has been linked to Rheumatoid arthritis patients,  though his RA was diagnosed 6 month ago.  physical exam: as above, abdominal tenderness on palpation.  a/p  1- pancreatitis: acute, unclear etiology, autoimmune workup pending.  f.u as outpatient, will need EUS.  He was advised that MTX would be better changed to a different medication as it may be contributing to the pancreatitis.  start clears today and advance as tolerated.  2- Rheumatoid arthritis: f.u outpatient  Would change MTX to a different medication.  rest as above.

## 2017-11-21 NOTE — PROGRESS NOTE ADULT - SUBJECTIVE AND OBJECTIVE BOX
INTERVAL HPI/ OVERNIGHT EVENTS: no major overnight events     VITAL SIGNS:  Vital Signs Last 24 Hrs  T(C): 37 (2017 05:57), Max: 37 (2017 05:57)  T(F): 98.6 (2017 05:57), Max: 98.6 (2017 05:57)  HR: 64 (2017 05:57) (57 - 66)  BP: 142/65 (2017 05:57) (132/66 - 145/76)  BP(mean): --  RR: 17 (2017 05:57) (16 - 17)  SpO2: 94% (2017 05:57) (94% - 95%)  PHYSICAL EXAM:    Constitutional: NAD   Eyes: PERRL, sclera clear   ENMT: no external lesions   Neck: supple, no JVD   Respiratory: CTAB   Cardiovascular: S1, S2 present, no RMG  Gastrointestinal: soft, upper abdominal tenderness worst in the epigastrium   Extremities: no cyanosis, pedal edema or clubbing   Vascular: pulses 2+b/l       MEDICATIONS  (STANDING):  docusate sodium 100 milliGRAM(s) Oral two times a day  famotidine Injectable 20 milliGRAM(s) IV Push two times a day  lactated ringers 1000 milliLiter(s) (200 mL/Hr) IV Continuous <Continuous>  nicotine -   7 mG/24Hr(s) Patch 1 patch Transdermal daily  senna 2 Tablet(s) Oral at bedtime    MEDICATIONS  (PRN):  HYDROmorphone  Injectable 0.5 milliGRAM(s) IV Push every 12 hours PRN Severe Pain (7 - 10)  ketorolac   Injectable 30 milliGRAM(s) IV Push every 4 hours PRN Mild Pain (1 - 3)  morphine  - Injectable 2 milliGRAM(s) IV Push every 4 hours PRN Moderate Pain (4 - 6)  ondansetron Injectable 4 milliGRAM(s) IV Push every 4 hours PRN Nausea and/or Vomiting      Allergies    No Known Allergies    Intolerances        LABS:                                            15.6   13.0  )-----------( 210      ( 2017 07:15 )             46.2   11-21    138  |  106  |  8   ----------------------------<  72  4.0   |  22  |  0.80    Ca    8.5      2017 07:15  Phos  2.6     -  Mg     2.2     -20    TPro  6.3  /  Alb  3.4<L>  /  TBili  0.6  /  DBili  x   /  AST  19  /  ALT  47  /  AlkPhos  57  11-         Urinalysis Basic - ( 2017 02:49 )    Color: Yellow / Appearance: Clear / S.005 / pH: x  Gluc: x / Ketone: Negative  / Bili: Negative / Urobili: Negative   Blood: x / Protein: 30 mg/dL / Nitrite: Negative   Leuk Esterase: Negative / RBC: 2-5 /HPF / WBC 0-2 /HPF   Sq Epi: x / Non Sq Epi: Occasional /HPF / Bacteria: x        RADIOLOGY & ADDITIONAL TESTS: INTERVAL HPI/ OVERNIGHT EVENTS: abdominal pain in reduced. Lipase level trending down. started on clear liquid diet     VITAL SIGNS:  Vital Signs Last 24 Hrs  T(C): 37 (2017 05:57), Max: 37 (2017 05:57)  T(F): 98.6 (2017 05:57), Max: 98.6 (2017 05:57)  HR: 64 (2017 05:57) (57 - 66)  BP: 142/65 (2017 05:57) (132/66 - 145/76)  BP(mean): --  RR: 17 (2017 05:57) (16 - 17)  SpO2: 94% (2017 05:57) (94% - 95%)  PHYSICAL EXAM:    Constitutional: NAD   Eyes: PERRL, sclera clear   ENMT: no external lesions   Neck: supple, no JVD   Respiratory: CTAB   Cardiovascular: S1, S2 present, no RMG  Gastrointestinal: soft, upper abdominal tenderness   Extremities: no cyanosis, pedal edema or clubbing   Vascular: pulses 2+b/l       MEDICATIONS  (STANDING):  docusate sodium 100 milliGRAM(s) Oral two times a day  famotidine Injectable 20 milliGRAM(s) IV Push two times a day  lactated ringers 1000 milliLiter(s) (200 mL/Hr) IV Continuous <Continuous>  nicotine -   7 mG/24Hr(s) Patch 1 patch Transdermal daily  senna 2 Tablet(s) Oral at bedtime    MEDICATIONS  (PRN):  HYDROmorphone  Injectable 0.5 milliGRAM(s) IV Push every 12 hours PRN Severe Pain (7 - 10)  ketorolac   Injectable 30 milliGRAM(s) IV Push every 4 hours PRN Mild Pain (1 - 3)  morphine  - Injectable 2 milliGRAM(s) IV Push every 4 hours PRN Moderate Pain (4 - 6)  ondansetron Injectable 4 milliGRAM(s) IV Push every 4 hours PRN Nausea and/or Vomiting      Allergies    No Known Allergies    Intolerances        LABS:                                            15.6   13.0  )-----------( 210      ( 2017 07:15 )             46.2   11-21    138  |  106  |  8   ----------------------------<  72  4.0   |  22  |  0.80    Ca    8.5      2017 07:15  Phos  2.6     11-20  Mg     2.2     -20    TPro  6.3  /  Alb  3.4<L>  /  TBili  0.6  /  DBili  x   /  AST  19  /  ALT  47  /  AlkPhos  57  11-20         Urinalysis Basic - ( 2017 02:49 )    Color: Yellow / Appearance: Clear / S.005 / pH: x  Gluc: x / Ketone: Negative  / Bili: Negative / Urobili: Negative   Blood: x / Protein: 30 mg/dL / Nitrite: Negative   Leuk Esterase: Negative / RBC: 2-5 /HPF / WBC 0-2 /HPF   Sq Epi: x / Non Sq Epi: Occasional /HPF / Bacteria: x        RADIOLOGY & ADDITIONAL TESTS:

## 2017-11-22 VITALS
DIASTOLIC BLOOD PRESSURE: 70 MMHG | HEART RATE: 61 BPM | OXYGEN SATURATION: 97 % | RESPIRATION RATE: 16 BRPM | TEMPERATURE: 98 F | SYSTOLIC BLOOD PRESSURE: 107 MMHG

## 2017-11-22 LAB
ANA PAT FLD IF-IMP: ABNORMAL
ANA PATTERN 2: ABNORMAL
ANA TITR SER: ABNORMAL
ANION GAP SERPL CALC-SCNC: 8 MMOL/L — SIGNIFICANT CHANGE UP (ref 5–17)
ANTI NUCLEAR FACTOR TITER 2: ABNORMAL
BUN SERPL-MCNC: 8 MG/DL — SIGNIFICANT CHANGE UP (ref 7–18)
CALCIUM SERPL-MCNC: 8.7 MG/DL — SIGNIFICANT CHANGE UP (ref 8.4–10.5)
CHLORIDE SERPL-SCNC: 108 MMOL/L — SIGNIFICANT CHANGE UP (ref 96–108)
CO2 SERPL-SCNC: 24 MMOL/L — SIGNIFICANT CHANGE UP (ref 22–31)
CREAT SERPL-MCNC: 0.9 MG/DL — SIGNIFICANT CHANGE UP (ref 0.5–1.3)
GLUCOSE SERPL-MCNC: 77 MG/DL — SIGNIFICANT CHANGE UP (ref 70–99)
HCT VFR BLD CALC: 48.6 % — SIGNIFICANT CHANGE UP (ref 39–50)
HGB BLD-MCNC: 16 G/DL — SIGNIFICANT CHANGE UP (ref 13–17)
MCHC RBC-ENTMCNC: 29.4 PG — SIGNIFICANT CHANGE UP (ref 27–34)
MCHC RBC-ENTMCNC: 33 GM/DL — SIGNIFICANT CHANGE UP (ref 32–36)
MCV RBC AUTO: 89 FL — SIGNIFICANT CHANGE UP (ref 80–100)
PLATELET # BLD AUTO: 243 K/UL — SIGNIFICANT CHANGE UP (ref 150–400)
POTASSIUM SERPL-MCNC: 3.9 MMOL/L — SIGNIFICANT CHANGE UP (ref 3.5–5.3)
POTASSIUM SERPL-SCNC: 3.9 MMOL/L — SIGNIFICANT CHANGE UP (ref 3.5–5.3)
RBC # BLD: 5.46 M/UL — SIGNIFICANT CHANGE UP (ref 4.2–5.8)
RBC # FLD: 14.3 % — SIGNIFICANT CHANGE UP (ref 10.3–14.5)
SODIUM SERPL-SCNC: 140 MMOL/L — SIGNIFICANT CHANGE UP (ref 135–145)
WBC # BLD: 14.2 K/UL — HIGH (ref 3.8–10.5)
WBC # FLD AUTO: 14.2 K/UL — HIGH (ref 3.8–10.5)

## 2017-11-22 PROCEDURE — 81001 URINALYSIS AUTO W/SCOPE: CPT

## 2017-11-22 PROCEDURE — 82306 VITAMIN D 25 HYDROXY: CPT

## 2017-11-22 PROCEDURE — 96374 THER/PROPH/DIAG INJ IV PUSH: CPT | Mod: XU

## 2017-11-22 PROCEDURE — 83690 ASSAY OF LIPASE: CPT

## 2017-11-22 PROCEDURE — 83036 HEMOGLOBIN GLYCOSYLATED A1C: CPT

## 2017-11-22 PROCEDURE — 82787 IGG 1 2 3 OR 4 EACH: CPT

## 2017-11-22 PROCEDURE — 80061 LIPID PANEL: CPT

## 2017-11-22 PROCEDURE — 84100 ASSAY OF PHOSPHORUS: CPT

## 2017-11-22 PROCEDURE — 99285 EMERGENCY DEPT VISIT HI MDM: CPT | Mod: 25

## 2017-11-22 PROCEDURE — 84478 ASSAY OF TRIGLYCERIDES: CPT

## 2017-11-22 PROCEDURE — 80307 DRUG TEST PRSMV CHEM ANLYZR: CPT

## 2017-11-22 PROCEDURE — 83735 ASSAY OF MAGNESIUM: CPT

## 2017-11-22 PROCEDURE — 74177 CT ABD & PELVIS W/CONTRAST: CPT

## 2017-11-22 PROCEDURE — 85610 PROTHROMBIN TIME: CPT

## 2017-11-22 PROCEDURE — 93005 ELECTROCARDIOGRAM TRACING: CPT

## 2017-11-22 PROCEDURE — 80053 COMPREHEN METABOLIC PANEL: CPT

## 2017-11-22 PROCEDURE — 87040 BLOOD CULTURE FOR BACTERIA: CPT

## 2017-11-22 PROCEDURE — 96375 TX/PRO/DX INJ NEW DRUG ADDON: CPT

## 2017-11-22 PROCEDURE — 80048 BASIC METABOLIC PNL TOTAL CA: CPT

## 2017-11-22 PROCEDURE — 85027 COMPLETE CBC AUTOMATED: CPT

## 2017-11-22 PROCEDURE — 99233 SBSQ HOSP IP/OBS HIGH 50: CPT | Mod: GC

## 2017-11-22 PROCEDURE — 86038 ANTINUCLEAR ANTIBODIES: CPT

## 2017-11-22 PROCEDURE — 82746 ASSAY OF FOLIC ACID SERUM: CPT

## 2017-11-22 PROCEDURE — 71046 X-RAY EXAM CHEST 2 VIEWS: CPT

## 2017-11-22 PROCEDURE — 82607 VITAMIN B-12: CPT

## 2017-11-22 PROCEDURE — 84443 ASSAY THYROID STIM HORMONE: CPT

## 2017-11-22 RX ORDER — NICOTINE POLACRILEX 2 MG
0 GUM BUCCAL
Qty: 0 | Refills: 0 | COMMUNITY
Start: 2017-11-22

## 2017-11-22 RX ORDER — DIPHENHYDRAMINE HCL 50 MG
25 CAPSULE ORAL ONCE
Qty: 0 | Refills: 0 | Status: COMPLETED | OUTPATIENT
Start: 2017-11-22 | End: 2017-11-22

## 2017-11-22 RX ORDER — SODIUM CHLORIDE 9 MG/ML
1000 INJECTION, SOLUTION INTRAVENOUS
Qty: 0 | Refills: 0 | Status: DISCONTINUED | OUTPATIENT
Start: 2017-11-22 | End: 2017-11-22

## 2017-11-22 RX ORDER — NICOTINE POLACRILEX 2 MG
1 GUM BUCCAL
Qty: 1 | Refills: 0 | OUTPATIENT
Start: 2017-11-22

## 2017-11-22 RX ADMIN — Medication 30 MILLIGRAM(S): at 06:04

## 2017-11-22 RX ADMIN — SODIUM CHLORIDE 125 MILLILITER(S): 9 INJECTION, SOLUTION INTRAVENOUS at 10:18

## 2017-11-22 RX ADMIN — FAMOTIDINE 20 MILLIGRAM(S): 10 INJECTION INTRAVENOUS at 05:59

## 2017-11-22 RX ADMIN — Medication 30 MILLIGRAM(S): at 06:34

## 2017-11-22 RX ADMIN — Medication 25 MILLIGRAM(S): at 00:19

## 2017-11-22 NOTE — PROGRESS NOTE ADULT - PROBLEM SELECTOR PLAN 2
- holding methotrexate
- holding methotrexate
holding methotrexate  f.u with Rheum
- holding methotrexate

## 2017-11-22 NOTE — PROGRESS NOTE ADULT - SUBJECTIVE AND OBJECTIVE BOX
Patient is a 42y old  Male who presents with a chief complaint of abdominal pain, nausea, vomiting (20 Nov 2017 07:44)    INTERVAL HPI/OVERNIGHT EVENTS:  Patient seen and examined at bedside, feeling well,   Complaints of no pain on eating, was started on full liquids yesterday.  I discussed the condition with him and advised him that he needs to pursue EUS and possible ERCP when the pancreatitis episode is fully resolved.  He will need to f.u with GI as outpatient.  In regards to MTX, he was advised to switch to other meds as it may be exacerbating his condition.    Allergies    No Known Allergies    Intolerances    REVIEW OF SYSTEMS:  CONSTITUTIONAL: No fever, weight loss, or fatigue  EYES: No eye pain, visual disturbances, or discharge  RESPIRATORY: No cough, wheezing or shortness of breath  CARDIOVASCULAR: No chest pain, feeling of heart beats  GASTROINTESTINAL: No abdominal or epigastric pain. No nausea, vomiting; No diarrhea or constipation.  GENITOURINARY: No dysuria, frequency, hematuria  NEUROLOGICAL: No headaches  MUSCULOSKELETAL: No joint pain  PSYCHIATRIC: No depression or anxiety  HEME/LYMPH: No easy bruising, or bleeding gums  ALLERGY AND IMMUNOLOGIC: No hives or eczema    Medications:  docusate sodium 100 milliGRAM(s) Oral two times a day  famotidine Injectable 20 milliGRAM(s) IV Push two times a day  HYDRO morphone  Injectable 0.5 milliGRAM(s) IV Push every 12 hours PRN Severe Pain (7 - 10)  ketorolac   Injectable 30 milliGRAM(s) IV Push every 4 hours PRN Mild Pain (1 - 3)  lactated ringers 1000 milliLiter(s) IV Continuous <Continuous>  morphine  - Injectable 2 milliGRAM(s) IV Push every 4 hours PRN Moderate Pain (4 - 6)  ondansetron Injectable 4 milliGRAM(s) IV Push every 4 hours PRN Nausea and/or Vomiting  senna 2 Tablet(s) Oral at bedtime  simethicone 80 milliGRAM(s) Chew every 6 hours PRN Gas      PHYSICAL EXAM:  Vital Signs Last 24 Hrs  T(C): 36.8 (22 Nov 2017 05:44), Max: 37 (21 Nov 2017 20:59)  T(F): 98.3 (22 Nov 2017 05:44), Max: 98.6 (21 Nov 2017 20:59)  HR: 68 (22 Nov 2017 05:44) (68 - 77)  BP: 115/61 (22 Nov 2017 05:44) (109/53 - 119/71)  BP(mean): --  RR: 16 (22 Nov 2017 05:44) (16 - 16)  SpO2: 95% (22 Nov 2017 05:44) (95% - 99%)    GENERAL: NAD  HEAD:  Atraumatic, Normocephalic  EYES: EOMI, PERRLA, conjunctiva and sclera clear  ENT: moist mucous membranes  NECK: Supple, No JVD, Normal thyroid  NERVOUS SYSTEM:  Alert & Oriented X3, Good concentration; Motor Strength 5/5 B/L upper and lower extremities; DTRs 2+ intact and symmetric  CHEST/LUNG: No rales, rhonchi, wheezing, or rubs  HEART: Regular rate and rhythm; No murmurs, rubs, or gallops  ABDOMEN: Soft, Nontender, Nondistended; Bowel sounds present  EXTREMITIES:  2+ Peripheral Pulses, No clubbing, cyanosis, or edema  SKIN: No rashes or lesions    LABS:                        16.0   14.2  )-----------( 243      ( 22 Nov 2017 07:38 )             48.6     11-22    140  |  108  |  8   ----------------------------<  77  3.9   |  24  |  0.90    Ca    8.7      22 Nov 2017 07:38    Culture & Sensitivity:   CAPILLARY BLOOD GLUCOSE    RADIOLOGY & ADDITIONAL TESTS:  Radiology testing independently reviewed:   < from: CT Abdomen and Pelvis w/ Oral Cont and w/ IV Cont (11.19.17 @ 23:16) >  IMPRESSION:   Mild acute pancreatitis without complication.      < end of copied text >    Consultant(s) Notes Reviewed:  [ x] YES  [ ] NO    Care Discussed with Consultants/Other Providers [ x] YES  [ ] NO

## 2017-11-22 NOTE — PROGRESS NOTE ADULT - PROBLEM SELECTOR PLAN 1
- patient has recurrent pancreatis of unclear etiology   - NANY levels elevated.  IG4 levels were normal   - possible methotrexate is a synergistic agent; will hold   - continue hydration, pain management ( toradol-mild, morphine-moderate, and dilaudid-severe pain)   - continue as needed Zofran for nausea   - started on soft diet today   - Dr Linder, GI - no plans for ERCP
- patient has recurrent pancreatis of unclear etiology   - will get IG4 levels   - possible methotrexate is a synergistic agent; will hold   - continue hydration, pain management ( toradol-mild, morphine-moderate, and dilaudid-severe pain)   - continue as needed Zofran for nausea   - if patient continues to improve, will start on clears tomorrow   - Dr Linder, GI - no plans for ERCP
- resolving, unclear etiology, IGg4 negative,   - needs GI follow up post discharge for EUS and ERCP.  advised to consult with Rheum, and change MTX to a different one.  advance to Regular and Dc planning if tolerated.   NO ERCP now due to acute pancreatitis.
- patient has recurrent pancreatis of unclear etiology   - will get IG4 levels   - possible methotrexate is a synergistic agent; will hold   - continue hydration, pain management ( toradol-mild, morphine-moderate, and dilaudid-severe pain)   - continue as needed zofran for nausea   - if patient continues to improve, will start on clears tomorrow   - Dr Linder, GI - no plans for ERCP

## 2017-11-22 NOTE — PROGRESS NOTE ADULT - SUBJECTIVE AND OBJECTIVE BOX
INTERVAL HPI/ OVERNIGHT EVENTS: abdominal pain in reduced. Reports being hungry.     VITAL SIGNS:  Vital Signs Last 24 Hrs  T(C): 36.8 (2017 05:44), Max: 37 (2017 20:59)  T(F): 98.3 (2017 05:44), Max: 98.6 (2017 20:59)  HR: 68 (2017 05:44) (68 - 77)  BP: 115/61 (2017 05:44) (109/53 - 119/71)  BP(mean): --  RR: 16 (2017 05:44) (16 - 16)  SpO2: 95% (2017 05:44) (95% - 99%)    PHYSICAL EXAM:    Constitutional: NAD   Eyes: PERRL, sclera clear   ENMT: no external lesions   Neck: supple, no JVD   Respiratory: CTAB   Cardiovascular: S1, S2 present, no RMG  Gastrointestinal: soft, upper abdominal tenderness reduced significantly   Extremities: no cyanosis, pedal edema or clubbing   Vascular: pulses 2+b/l       MEDICATIONS  (STANDING):  docusate sodium 100 milliGRAM(s) Oral two times a day  famotidine Injectable 20 milliGRAM(s) IV Push two times a day  lactated ringers 1000 milliLiter(s) (200 mL/Hr) IV Continuous <Continuous>  nicotine -   7 mG/24Hr(s) Patch 1 patch Transdermal daily  senna 2 Tablet(s) Oral at bedtime    MEDICATIONS  (PRN):  HYDROmorphone  Injectable 0.5 milliGRAM(s) IV Push every 12 hours PRN Severe Pain (7 - 10)  ketorolac   Injectable 30 milliGRAM(s) IV Push every 4 hours PRN Mild Pain (1 - 3)  morphine  - Injectable 2 milliGRAM(s) IV Push every 4 hours PRN Moderate Pain (4 - 6)  ondansetron Injectable 4 milliGRAM(s) IV Push every 4 hours PRN Nausea and/or Vomiting      Allergies    No Known Allergies    Intolerances        LABS:                                                       16.0   14.2  )-----------( 243      ( 2017 07:38 )             48.6   11-22    140  |  108  |  8   ----------------------------<  77  3.9   |  24  |  0.90    Ca    8.7      2017 07:38      Urinalysis Basic - ( 2017 02:49 )    Color: Yellow / Appearance: Clear / S.005 / pH: x  Gluc: x / Ketone: Negative  / Bili: Negative / Urobili: Negative   Blood: x / Protein: 30 mg/dL / Nitrite: Negative   Leuk Esterase: Negative / RBC: 2-5 /HPF / WBC 0-2 /HPF   Sq Epi: x / Non Sq Epi: Occasional /HPF / Bacteria: x        RADIOLOGY & ADDITIONAL TESTS:

## 2017-11-22 NOTE — PROGRESS NOTE ADULT - ASSESSMENT
43 y/o male,  with PMHx of  recurrent pancreatitis, asthma/ COPD, Rheumatoid arthritis , current active smoker, anxiety ( occasional take xanax) , s/p cholecystectomy, alcohol abuse in the past (states he has not used in over 15 years)  presents to the ED c/o mid epigastric abdominal  pain  for 1 day. In the ED patient initial vital stable, abdominal tenderness, lipase was elevated, TAG normal, CT scan show pancreatitis, got diluadid x 3 by ED, 3 L bolus, looks dehydrated on exam, wbc 18, hb elevated, afebrile admitted for acute pancreatitis. He had a CT scan with mild acute pancreatitis and leukocytosis to 18, Hb 18, lipase 31573,  for which he got 3 L bolus and was made nil per oral. Toradol for pain and Zofran for nausea was initiated. Nicotine patch was placed for smoking cessation.   As per attending stable for discharge back to NH

## 2017-11-22 NOTE — PROGRESS NOTE ADULT - ASSESSMENT
41 y/o male,  with PMHx of  recurrent pancreatitis, asthma/ COPD, Rheumatoid arthritis , current active smoker, anxiety ( occasional take xanax) , s/p cholecystectomy, alcohol abuse in the past (states he has not used in over 15 years)  presents to the ED c/o mid epigastric abdominal  pain  for 1 day. In the ED patient initial vital stable, abdominal tenderness, lipase was elevated, TAG normal, CT scan show pancreatitis, got diluadid x 3 by ED, 3 L bolus, looks dehydrated on exam, wbc 18, hb elevated, afebrile admitted for acute pancreatitis. He had a CT scan with mild acute pancreatitis and leukocytosis to 18, Hb 18, lipase 85878,  for which he got 3 L bolus and was made nil per oral. Toradol for pain and Zofran for nausea was initiated. Nicotine patch was placed for smoking cessation.

## 2017-11-22 NOTE — PROGRESS NOTE ADULT - PROBLEM SELECTOR PLAN 4
- IMPROVE VTE = 0   - no need for chemical DVT ppx   - continue Pepcid for GI ppx

## 2017-11-22 NOTE — PROGRESS NOTE ADULT - PROBLEM SELECTOR PLAN 3
- smoking cessation done   - on nicotine patch
- smoking cessation done   - on nicotine patch
refusing nicotine patch for now.  Extensive counselling given, patient will quit.
- smoking cessation done   - on nicotine patch

## 2017-11-25 LAB
CULTURE RESULTS: SIGNIFICANT CHANGE UP
CULTURE RESULTS: SIGNIFICANT CHANGE UP
SPECIMEN SOURCE: SIGNIFICANT CHANGE UP
SPECIMEN SOURCE: SIGNIFICANT CHANGE UP

## 2018-02-02 NOTE — H&P ADULT - PSH
Detail Level: Detailed Body Location Override (Optional - Billing Will Still Be Based On Selected Body Map Location If Applicable): Left Lower Lateral Neck Add 09867 Cpt? (Important Note: In 2017 The Use Of 89303 Is Being Tracked By Cms To Determine Future Global Period Reimbursement For Global Periods): no History of Cholecystectomy

## 2022-01-04 ENCOUNTER — APPOINTMENT (OUTPATIENT)
Dept: DERMATOLOGY | Facility: CLINIC | Age: 47
End: 2022-01-04
Payer: COMMERCIAL

## 2022-01-04 VITALS — HEIGHT: 70 IN | WEIGHT: 220 LBS | BODY MASS INDEX: 31.5 KG/M2

## 2022-01-04 DIAGNOSIS — L21.9 SEBORRHEIC DERMATITIS, UNSPECIFIED: ICD-10-CM

## 2022-01-04 DIAGNOSIS — L30.9 DERMATITIS, UNSPECIFIED: ICD-10-CM

## 2022-01-04 PROCEDURE — 99204 OFFICE O/P NEW MOD 45 MIN: CPT

## 2022-01-04 RX ORDER — TRIAMCINOLONE ACETONIDE 1 MG/G
0.1 OINTMENT TOPICAL
Qty: 1 | Refills: 2 | Status: ACTIVE | COMMUNITY
Start: 2022-01-04 | End: 1900-01-01

## 2022-01-04 RX ORDER — KETOCONAZOLE 20 MG/G
2 CREAM TOPICAL TWICE DAILY
Qty: 1 | Refills: 3 | Status: ACTIVE | COMMUNITY
Start: 2022-01-04 | End: 1900-01-01

## 2023-05-08 NOTE — ED ADULT NURSE NOTE - DOES PATIENT HAVE ADVANCE DIRECTIVE
Yes, lipid panel in 3 months would be acceptable.    Order placed.    Leonel Costa MD  [facility]  5/8/2023     No

## 2023-12-17 ENCOUNTER — TRANSCRIPTION ENCOUNTER (OUTPATIENT)
Age: 48
End: 2023-12-17

## 2023-12-17 ENCOUNTER — INPATIENT (INPATIENT)
Facility: HOSPITAL | Age: 48
LOS: 1 days | Discharge: HOME | DRG: 440 | End: 2023-12-19
Attending: HOSPITALIST | Admitting: HOSPITALIST
Payer: COMMERCIAL

## 2023-12-17 VITALS
HEART RATE: 70 BPM | RESPIRATION RATE: 19 BRPM | TEMPERATURE: 98 F | DIASTOLIC BLOOD PRESSURE: 82 MMHG | SYSTOLIC BLOOD PRESSURE: 151 MMHG | OXYGEN SATURATION: 98 % | WEIGHT: 220.02 LBS | HEIGHT: 70 IN

## 2023-12-17 LAB
ALBUMIN SERPL ELPH-MCNC: 4.1 G/DL — SIGNIFICANT CHANGE UP (ref 3.5–5)
ALBUMIN SERPL ELPH-MCNC: 4.1 G/DL — SIGNIFICANT CHANGE UP (ref 3.5–5)
ALP SERPL-CCNC: 70 U/L — SIGNIFICANT CHANGE UP (ref 40–120)
ALP SERPL-CCNC: 70 U/L — SIGNIFICANT CHANGE UP (ref 40–120)
ALT FLD-CCNC: 32 U/L DA — SIGNIFICANT CHANGE UP (ref 10–60)
ALT FLD-CCNC: 32 U/L DA — SIGNIFICANT CHANGE UP (ref 10–60)
ANION GAP SERPL CALC-SCNC: 7 MMOL/L — SIGNIFICANT CHANGE UP (ref 5–17)
ANION GAP SERPL CALC-SCNC: 7 MMOL/L — SIGNIFICANT CHANGE UP (ref 5–17)
AST SERPL-CCNC: 15 U/L — SIGNIFICANT CHANGE UP (ref 10–40)
AST SERPL-CCNC: 15 U/L — SIGNIFICANT CHANGE UP (ref 10–40)
BASOPHILS # BLD AUTO: 0.05 K/UL — SIGNIFICANT CHANGE UP (ref 0–0.2)
BASOPHILS # BLD AUTO: 0.05 K/UL — SIGNIFICANT CHANGE UP (ref 0–0.2)
BASOPHILS NFR BLD AUTO: 0.3 % — SIGNIFICANT CHANGE UP (ref 0–2)
BASOPHILS NFR BLD AUTO: 0.3 % — SIGNIFICANT CHANGE UP (ref 0–2)
BILIRUB SERPL-MCNC: 0.7 MG/DL — SIGNIFICANT CHANGE UP (ref 0.2–1.2)
BILIRUB SERPL-MCNC: 0.7 MG/DL — SIGNIFICANT CHANGE UP (ref 0.2–1.2)
BUN SERPL-MCNC: 9 MG/DL — SIGNIFICANT CHANGE UP (ref 7–18)
BUN SERPL-MCNC: 9 MG/DL — SIGNIFICANT CHANGE UP (ref 7–18)
CALCIUM SERPL-MCNC: 8.9 MG/DL — SIGNIFICANT CHANGE UP (ref 8.4–10.5)
CALCIUM SERPL-MCNC: 8.9 MG/DL — SIGNIFICANT CHANGE UP (ref 8.4–10.5)
CHLORIDE SERPL-SCNC: 103 MMOL/L — SIGNIFICANT CHANGE UP (ref 96–108)
CHLORIDE SERPL-SCNC: 103 MMOL/L — SIGNIFICANT CHANGE UP (ref 96–108)
CO2 SERPL-SCNC: 27 MMOL/L — SIGNIFICANT CHANGE UP (ref 22–31)
CO2 SERPL-SCNC: 27 MMOL/L — SIGNIFICANT CHANGE UP (ref 22–31)
CREAT SERPL-MCNC: 1.1 MG/DL — SIGNIFICANT CHANGE UP (ref 0.5–1.3)
CREAT SERPL-MCNC: 1.1 MG/DL — SIGNIFICANT CHANGE UP (ref 0.5–1.3)
EGFR: 82 ML/MIN/1.73M2 — SIGNIFICANT CHANGE UP
EGFR: 82 ML/MIN/1.73M2 — SIGNIFICANT CHANGE UP
EOSINOPHIL # BLD AUTO: 0.01 K/UL — SIGNIFICANT CHANGE UP (ref 0–0.5)
EOSINOPHIL # BLD AUTO: 0.01 K/UL — SIGNIFICANT CHANGE UP (ref 0–0.5)
EOSINOPHIL NFR BLD AUTO: 0.1 % — SIGNIFICANT CHANGE UP (ref 0–6)
EOSINOPHIL NFR BLD AUTO: 0.1 % — SIGNIFICANT CHANGE UP (ref 0–6)
GLUCOSE SERPL-MCNC: 134 MG/DL — HIGH (ref 70–99)
GLUCOSE SERPL-MCNC: 134 MG/DL — HIGH (ref 70–99)
HCT VFR BLD CALC: 52.3 % — HIGH (ref 39–50)
HCT VFR BLD CALC: 52.3 % — HIGH (ref 39–50)
HGB BLD-MCNC: 17.2 G/DL — HIGH (ref 13–17)
HGB BLD-MCNC: 17.2 G/DL — HIGH (ref 13–17)
IMM GRANULOCYTES NFR BLD AUTO: 0.4 % — SIGNIFICANT CHANGE UP (ref 0–0.9)
IMM GRANULOCYTES NFR BLD AUTO: 0.4 % — SIGNIFICANT CHANGE UP (ref 0–0.9)
LIDOCAIN IGE QN: 765 U/L — HIGH (ref 13–75)
LIDOCAIN IGE QN: 765 U/L — HIGH (ref 13–75)
LYMPHOCYTES # BLD AUTO: 11.7 % — LOW (ref 13–44)
LYMPHOCYTES # BLD AUTO: 11.7 % — LOW (ref 13–44)
LYMPHOCYTES # BLD AUTO: 2.12 K/UL — SIGNIFICANT CHANGE UP (ref 1–3.3)
LYMPHOCYTES # BLD AUTO: 2.12 K/UL — SIGNIFICANT CHANGE UP (ref 1–3.3)
MCHC RBC-ENTMCNC: 27.6 PG — SIGNIFICANT CHANGE UP (ref 27–34)
MCHC RBC-ENTMCNC: 27.6 PG — SIGNIFICANT CHANGE UP (ref 27–34)
MCHC RBC-ENTMCNC: 32.9 GM/DL — SIGNIFICANT CHANGE UP (ref 32–36)
MCHC RBC-ENTMCNC: 32.9 GM/DL — SIGNIFICANT CHANGE UP (ref 32–36)
MCV RBC AUTO: 83.9 FL — SIGNIFICANT CHANGE UP (ref 80–100)
MCV RBC AUTO: 83.9 FL — SIGNIFICANT CHANGE UP (ref 80–100)
MONOCYTES # BLD AUTO: 0.95 K/UL — HIGH (ref 0–0.9)
MONOCYTES # BLD AUTO: 0.95 K/UL — HIGH (ref 0–0.9)
MONOCYTES NFR BLD AUTO: 5.2 % — SIGNIFICANT CHANGE UP (ref 2–14)
MONOCYTES NFR BLD AUTO: 5.2 % — SIGNIFICANT CHANGE UP (ref 2–14)
NEUTROPHILS # BLD AUTO: 14.99 K/UL — HIGH (ref 1.8–7.4)
NEUTROPHILS # BLD AUTO: 14.99 K/UL — HIGH (ref 1.8–7.4)
NEUTROPHILS NFR BLD AUTO: 82.3 % — HIGH (ref 43–77)
NEUTROPHILS NFR BLD AUTO: 82.3 % — HIGH (ref 43–77)
NRBC # BLD: 0 /100 WBCS — SIGNIFICANT CHANGE UP (ref 0–0)
NRBC # BLD: 0 /100 WBCS — SIGNIFICANT CHANGE UP (ref 0–0)
PLATELET # BLD AUTO: 285 K/UL — SIGNIFICANT CHANGE UP (ref 150–400)
PLATELET # BLD AUTO: 285 K/UL — SIGNIFICANT CHANGE UP (ref 150–400)
POTASSIUM SERPL-MCNC: 4 MMOL/L — SIGNIFICANT CHANGE UP (ref 3.5–5.3)
POTASSIUM SERPL-MCNC: 4 MMOL/L — SIGNIFICANT CHANGE UP (ref 3.5–5.3)
POTASSIUM SERPL-SCNC: 4 MMOL/L — SIGNIFICANT CHANGE UP (ref 3.5–5.3)
POTASSIUM SERPL-SCNC: 4 MMOL/L — SIGNIFICANT CHANGE UP (ref 3.5–5.3)
PROT SERPL-MCNC: 8 G/DL — SIGNIFICANT CHANGE UP (ref 6–8.3)
PROT SERPL-MCNC: 8 G/DL — SIGNIFICANT CHANGE UP (ref 6–8.3)
RBC # BLD: 6.23 M/UL — HIGH (ref 4.2–5.8)
RBC # BLD: 6.23 M/UL — HIGH (ref 4.2–5.8)
RBC # FLD: 13.1 % — SIGNIFICANT CHANGE UP (ref 10.3–14.5)
RBC # FLD: 13.1 % — SIGNIFICANT CHANGE UP (ref 10.3–14.5)
SODIUM SERPL-SCNC: 137 MMOL/L — SIGNIFICANT CHANGE UP (ref 135–145)
SODIUM SERPL-SCNC: 137 MMOL/L — SIGNIFICANT CHANGE UP (ref 135–145)
WBC # BLD: 18.19 K/UL — HIGH (ref 3.8–10.5)
WBC # BLD: 18.19 K/UL — HIGH (ref 3.8–10.5)
WBC # FLD AUTO: 18.19 K/UL — HIGH (ref 3.8–10.5)
WBC # FLD AUTO: 18.19 K/UL — HIGH (ref 3.8–10.5)

## 2023-12-17 PROCEDURE — 99285 EMERGENCY DEPT VISIT HI MDM: CPT

## 2023-12-17 RX ORDER — ACETAMINOPHEN 500 MG
1000 TABLET ORAL ONCE
Refills: 0 | Status: COMPLETED | OUTPATIENT
Start: 2023-12-17 | End: 2023-12-17

## 2023-12-17 RX ORDER — FAMOTIDINE 10 MG/ML
20 INJECTION INTRAVENOUS ONCE
Refills: 0 | Status: COMPLETED | OUTPATIENT
Start: 2023-12-17 | End: 2023-12-17

## 2023-12-17 RX ORDER — ONDANSETRON 8 MG/1
4 TABLET, FILM COATED ORAL ONCE
Refills: 0 | Status: COMPLETED | OUTPATIENT
Start: 2023-12-17 | End: 2023-12-17

## 2023-12-17 RX ORDER — SODIUM CHLORIDE 9 MG/ML
1000 INJECTION INTRAMUSCULAR; INTRAVENOUS; SUBCUTANEOUS ONCE
Refills: 0 | Status: COMPLETED | OUTPATIENT
Start: 2023-12-17 | End: 2023-12-17

## 2023-12-17 RX ORDER — MORPHINE SULFATE 50 MG/1
4 CAPSULE, EXTENDED RELEASE ORAL ONCE
Refills: 0 | Status: DISCONTINUED | OUTPATIENT
Start: 2023-12-17 | End: 2023-12-17

## 2023-12-17 RX ADMIN — ONDANSETRON 4 MILLIGRAM(S): 8 TABLET, FILM COATED ORAL at 23:02

## 2023-12-17 RX ADMIN — MORPHINE SULFATE 4 MILLIGRAM(S): 50 CAPSULE, EXTENDED RELEASE ORAL at 23:02

## 2023-12-17 RX ADMIN — Medication 400 MILLIGRAM(S): at 23:05

## 2023-12-17 RX ADMIN — FAMOTIDINE 20 MILLIGRAM(S): 10 INJECTION INTRAVENOUS at 23:05

## 2023-12-17 RX ADMIN — SODIUM CHLORIDE 1000 MILLILITER(S): 9 INJECTION INTRAMUSCULAR; INTRAVENOUS; SUBCUTANEOUS at 23:12

## 2023-12-17 NOTE — ED PROVIDER NOTE - PHYSICAL EXAMINATION
Exam:  General: Patient well appearing but uncomfortable, vital signs within normal limits  HEENT: airway patent with moist mucous membranes  Cardiac: RRR S1/S2 with strong peripheral pulses  Respiratory: lungs clear without respiratory distress  GI: abdomen soft, epigastric tenderness, non distended  Neuro: no gross neurologic deficits  Skin: warm, well perfused  Psych: normal mood and affect

## 2023-12-17 NOTE — ED PROVIDER NOTE - CLINICAL SUMMARY MEDICAL DECISION MAKING FREE TEXT BOX
Patient presenting for episode of acute on chronic pancreatitis.  Will obtain screening labs LFTs and lipase.  Will treat patient's symptoms and reevaluate need for imaging or further treatment.

## 2023-12-17 NOTE — ED PROVIDER NOTE - OBJECTIVE STATEMENT
48-year-old man with a history of chronic pancreatitis presenting complaining of upper abdominal pain like his prior exacerbations.  He reports that his prior pancreatitis was induced by drinking but that he was not drinking this time.  He does report "eating like a pig" which she believes is the source.  The symptoms began 1 to 2 days ago and he thought he could manage it at home but tonight his pain became more significant.

## 2023-12-17 NOTE — ED PROVIDER NOTE - PROGRESS NOTE DETAILS
Merrill THURSTON: Received sign out from Dr. CANDICE Stewart.  CT reported Mild acute interstitial pancreatitis with secondary inflammation of the   duodenum.  Pt admitted for IV hydration, analgesia, lipase monitoring,  I had a detailed discussion with the patient and/or guardian regarding the historical points, exam findings, and any diagnostic results supporting the admit diagnosis.

## 2023-12-18 DIAGNOSIS — Z29.9 ENCOUNTER FOR PROPHYLACTIC MEASURES, UNSPECIFIED: ICD-10-CM

## 2023-12-18 DIAGNOSIS — F41.9 ANXIETY DISORDER, UNSPECIFIED: ICD-10-CM

## 2023-12-18 DIAGNOSIS — E11.9 TYPE 2 DIABETES MELLITUS WITHOUT COMPLICATIONS: ICD-10-CM

## 2023-12-18 DIAGNOSIS — K85.90 ACUTE PANCREATITIS WITHOUT NECROSIS OR INFECTION, UNSPECIFIED: ICD-10-CM

## 2023-12-18 LAB
ALBUMIN SERPL ELPH-MCNC: 3.4 G/DL — LOW (ref 3.5–5)
ALBUMIN SERPL ELPH-MCNC: 3.4 G/DL — LOW (ref 3.5–5)
ALP SERPL-CCNC: 59 U/L — SIGNIFICANT CHANGE UP (ref 40–120)
ALP SERPL-CCNC: 59 U/L — SIGNIFICANT CHANGE UP (ref 40–120)
ALT FLD-CCNC: 27 U/L DA — SIGNIFICANT CHANGE UP (ref 10–60)
ALT FLD-CCNC: 27 U/L DA — SIGNIFICANT CHANGE UP (ref 10–60)
ANION GAP SERPL CALC-SCNC: 3 MMOL/L — LOW (ref 5–17)
ANION GAP SERPL CALC-SCNC: 3 MMOL/L — LOW (ref 5–17)
AST SERPL-CCNC: 10 U/L — SIGNIFICANT CHANGE UP (ref 10–40)
AST SERPL-CCNC: 10 U/L — SIGNIFICANT CHANGE UP (ref 10–40)
BASOPHILS # BLD AUTO: 0.04 K/UL — SIGNIFICANT CHANGE UP (ref 0–0.2)
BASOPHILS # BLD AUTO: 0.04 K/UL — SIGNIFICANT CHANGE UP (ref 0–0.2)
BASOPHILS NFR BLD AUTO: 0.4 % — SIGNIFICANT CHANGE UP (ref 0–2)
BASOPHILS NFR BLD AUTO: 0.4 % — SIGNIFICANT CHANGE UP (ref 0–2)
BILIRUB SERPL-MCNC: 0.7 MG/DL — SIGNIFICANT CHANGE UP (ref 0.2–1.2)
BILIRUB SERPL-MCNC: 0.7 MG/DL — SIGNIFICANT CHANGE UP (ref 0.2–1.2)
BUN SERPL-MCNC: 9 MG/DL — SIGNIFICANT CHANGE UP (ref 7–18)
BUN SERPL-MCNC: 9 MG/DL — SIGNIFICANT CHANGE UP (ref 7–18)
CALCIUM SERPL-MCNC: 9.3 MG/DL — SIGNIFICANT CHANGE UP (ref 8.4–10.5)
CALCIUM SERPL-MCNC: 9.3 MG/DL — SIGNIFICANT CHANGE UP (ref 8.4–10.5)
CHLORIDE SERPL-SCNC: 106 MMOL/L — SIGNIFICANT CHANGE UP (ref 96–108)
CHLORIDE SERPL-SCNC: 106 MMOL/L — SIGNIFICANT CHANGE UP (ref 96–108)
CHOLEST SERPL-MCNC: 153 MG/DL — SIGNIFICANT CHANGE UP
CHOLEST SERPL-MCNC: 153 MG/DL — SIGNIFICANT CHANGE UP
CO2 SERPL-SCNC: 27 MMOL/L — SIGNIFICANT CHANGE UP (ref 22–31)
CO2 SERPL-SCNC: 27 MMOL/L — SIGNIFICANT CHANGE UP (ref 22–31)
CREAT SERPL-MCNC: 0.91 MG/DL — SIGNIFICANT CHANGE UP (ref 0.5–1.3)
CREAT SERPL-MCNC: 0.91 MG/DL — SIGNIFICANT CHANGE UP (ref 0.5–1.3)
EGFR: 103 ML/MIN/1.73M2 — SIGNIFICANT CHANGE UP
EGFR: 103 ML/MIN/1.73M2 — SIGNIFICANT CHANGE UP
EOSINOPHIL # BLD AUTO: 0.09 K/UL — SIGNIFICANT CHANGE UP (ref 0–0.5)
EOSINOPHIL # BLD AUTO: 0.09 K/UL — SIGNIFICANT CHANGE UP (ref 0–0.5)
EOSINOPHIL NFR BLD AUTO: 0.8 % — SIGNIFICANT CHANGE UP (ref 0–6)
EOSINOPHIL NFR BLD AUTO: 0.8 % — SIGNIFICANT CHANGE UP (ref 0–6)
GLUCOSE BLDC GLUCOMTR-MCNC: 110 MG/DL — HIGH (ref 70–99)
GLUCOSE BLDC GLUCOMTR-MCNC: 110 MG/DL — HIGH (ref 70–99)
GLUCOSE BLDC GLUCOMTR-MCNC: 93 MG/DL — SIGNIFICANT CHANGE UP (ref 70–99)
GLUCOSE BLDC GLUCOMTR-MCNC: 96 MG/DL — SIGNIFICANT CHANGE UP (ref 70–99)
GLUCOSE BLDC GLUCOMTR-MCNC: 96 MG/DL — SIGNIFICANT CHANGE UP (ref 70–99)
GLUCOSE SERPL-MCNC: 130 MG/DL — HIGH (ref 70–99)
GLUCOSE SERPL-MCNC: 130 MG/DL — HIGH (ref 70–99)
HCT VFR BLD CALC: 45.4 % — SIGNIFICANT CHANGE UP (ref 39–50)
HCT VFR BLD CALC: 45.4 % — SIGNIFICANT CHANGE UP (ref 39–50)
HDLC SERPL-MCNC: 40 MG/DL — LOW
HDLC SERPL-MCNC: 40 MG/DL — LOW
HGB BLD-MCNC: 14.9 G/DL — SIGNIFICANT CHANGE UP (ref 13–17)
HGB BLD-MCNC: 14.9 G/DL — SIGNIFICANT CHANGE UP (ref 13–17)
IMM GRANULOCYTES NFR BLD AUTO: 0.5 % — SIGNIFICANT CHANGE UP (ref 0–0.9)
IMM GRANULOCYTES NFR BLD AUTO: 0.5 % — SIGNIFICANT CHANGE UP (ref 0–0.9)
LIPID PNL WITH DIRECT LDL SERPL: 93 MG/DL — SIGNIFICANT CHANGE UP
LIPID PNL WITH DIRECT LDL SERPL: 93 MG/DL — SIGNIFICANT CHANGE UP
LYMPHOCYTES # BLD AUTO: 2.22 K/UL — SIGNIFICANT CHANGE UP (ref 1–3.3)
LYMPHOCYTES # BLD AUTO: 2.22 K/UL — SIGNIFICANT CHANGE UP (ref 1–3.3)
LYMPHOCYTES # BLD AUTO: 20.3 % — SIGNIFICANT CHANGE UP (ref 13–44)
LYMPHOCYTES # BLD AUTO: 20.3 % — SIGNIFICANT CHANGE UP (ref 13–44)
MAGNESIUM SERPL-MCNC: 2.1 MG/DL — SIGNIFICANT CHANGE UP (ref 1.6–2.6)
MAGNESIUM SERPL-MCNC: 2.1 MG/DL — SIGNIFICANT CHANGE UP (ref 1.6–2.6)
MCHC RBC-ENTMCNC: 27.2 PG — SIGNIFICANT CHANGE UP (ref 27–34)
MCHC RBC-ENTMCNC: 27.2 PG — SIGNIFICANT CHANGE UP (ref 27–34)
MCHC RBC-ENTMCNC: 32.8 GM/DL — SIGNIFICANT CHANGE UP (ref 32–36)
MCHC RBC-ENTMCNC: 32.8 GM/DL — SIGNIFICANT CHANGE UP (ref 32–36)
MCV RBC AUTO: 82.8 FL — SIGNIFICANT CHANGE UP (ref 80–100)
MCV RBC AUTO: 82.8 FL — SIGNIFICANT CHANGE UP (ref 80–100)
MONOCYTES # BLD AUTO: 0.69 K/UL — SIGNIFICANT CHANGE UP (ref 0–0.9)
MONOCYTES # BLD AUTO: 0.69 K/UL — SIGNIFICANT CHANGE UP (ref 0–0.9)
MONOCYTES NFR BLD AUTO: 6.3 % — SIGNIFICANT CHANGE UP (ref 2–14)
MONOCYTES NFR BLD AUTO: 6.3 % — SIGNIFICANT CHANGE UP (ref 2–14)
NEUTROPHILS # BLD AUTO: 7.87 K/UL — HIGH (ref 1.8–7.4)
NEUTROPHILS # BLD AUTO: 7.87 K/UL — HIGH (ref 1.8–7.4)
NEUTROPHILS NFR BLD AUTO: 71.7 % — SIGNIFICANT CHANGE UP (ref 43–77)
NEUTROPHILS NFR BLD AUTO: 71.7 % — SIGNIFICANT CHANGE UP (ref 43–77)
NON HDL CHOLESTEROL: 113 MG/DL — SIGNIFICANT CHANGE UP
NON HDL CHOLESTEROL: 113 MG/DL — SIGNIFICANT CHANGE UP
NRBC # BLD: 0 /100 WBCS — SIGNIFICANT CHANGE UP (ref 0–0)
NRBC # BLD: 0 /100 WBCS — SIGNIFICANT CHANGE UP (ref 0–0)
PHOSPHATE SERPL-MCNC: 2.6 MG/DL — SIGNIFICANT CHANGE UP (ref 2.5–4.5)
PHOSPHATE SERPL-MCNC: 2.6 MG/DL — SIGNIFICANT CHANGE UP (ref 2.5–4.5)
PLATELET # BLD AUTO: 207 K/UL — SIGNIFICANT CHANGE UP (ref 150–400)
PLATELET # BLD AUTO: 207 K/UL — SIGNIFICANT CHANGE UP (ref 150–400)
POTASSIUM SERPL-MCNC: 4.1 MMOL/L — SIGNIFICANT CHANGE UP (ref 3.5–5.3)
POTASSIUM SERPL-MCNC: 4.1 MMOL/L — SIGNIFICANT CHANGE UP (ref 3.5–5.3)
POTASSIUM SERPL-SCNC: 4.1 MMOL/L — SIGNIFICANT CHANGE UP (ref 3.5–5.3)
POTASSIUM SERPL-SCNC: 4.1 MMOL/L — SIGNIFICANT CHANGE UP (ref 3.5–5.3)
PROT SERPL-MCNC: 7 G/DL — SIGNIFICANT CHANGE UP (ref 6–8.3)
PROT SERPL-MCNC: 7 G/DL — SIGNIFICANT CHANGE UP (ref 6–8.3)
RBC # BLD: 5.48 M/UL — SIGNIFICANT CHANGE UP (ref 4.2–5.8)
RBC # BLD: 5.48 M/UL — SIGNIFICANT CHANGE UP (ref 4.2–5.8)
RBC # FLD: 13.3 % — SIGNIFICANT CHANGE UP (ref 10.3–14.5)
RBC # FLD: 13.3 % — SIGNIFICANT CHANGE UP (ref 10.3–14.5)
SODIUM SERPL-SCNC: 136 MMOL/L — SIGNIFICANT CHANGE UP (ref 135–145)
SODIUM SERPL-SCNC: 136 MMOL/L — SIGNIFICANT CHANGE UP (ref 135–145)
TRIGL SERPL-MCNC: 102 MG/DL — SIGNIFICANT CHANGE UP
TRIGL SERPL-MCNC: 102 MG/DL — SIGNIFICANT CHANGE UP
WBC # BLD: 10.96 K/UL — HIGH (ref 3.8–10.5)
WBC # BLD: 10.96 K/UL — HIGH (ref 3.8–10.5)
WBC # FLD AUTO: 10.96 K/UL — HIGH (ref 3.8–10.5)
WBC # FLD AUTO: 10.96 K/UL — HIGH (ref 3.8–10.5)

## 2023-12-18 PROCEDURE — 74177 CT ABD & PELVIS W/CONTRAST: CPT | Mod: 26,MA

## 2023-12-18 PROCEDURE — 76705 ECHO EXAM OF ABDOMEN: CPT | Mod: 26

## 2023-12-18 PROCEDURE — 99222 1ST HOSP IP/OBS MODERATE 55: CPT

## 2023-12-18 RX ORDER — INSULIN LISPRO 100/ML
VIAL (ML) SUBCUTANEOUS AT BEDTIME
Refills: 0 | Status: DISCONTINUED | OUTPATIENT
Start: 2023-12-18 | End: 2023-12-19

## 2023-12-18 RX ORDER — INSULIN LISPRO 100/ML
VIAL (ML) SUBCUTANEOUS
Refills: 0 | Status: DISCONTINUED | OUTPATIENT
Start: 2023-12-18 | End: 2023-12-19

## 2023-12-18 RX ORDER — SODIUM CHLORIDE 9 MG/ML
1000 INJECTION INTRAMUSCULAR; INTRAVENOUS; SUBCUTANEOUS ONCE
Refills: 0 | Status: COMPLETED | OUTPATIENT
Start: 2023-12-18 | End: 2023-12-18

## 2023-12-18 RX ORDER — NALOXONE HYDROCHLORIDE 4 MG/.1ML
0.4 SPRAY NASAL ONCE
Refills: 0 | Status: DISCONTINUED | OUTPATIENT
Start: 2023-12-18 | End: 2023-12-19

## 2023-12-18 RX ORDER — SODIUM CHLORIDE 9 MG/ML
1000 INJECTION, SOLUTION INTRAVENOUS
Refills: 0 | Status: DISCONTINUED | OUTPATIENT
Start: 2023-12-18 | End: 2023-12-19

## 2023-12-18 RX ORDER — ACETAMINOPHEN 500 MG
1000 TABLET ORAL EVERY 8 HOURS
Refills: 0 | Status: DISCONTINUED | OUTPATIENT
Start: 2023-12-18 | End: 2023-12-19

## 2023-12-18 RX ORDER — SENNA PLUS 8.6 MG/1
2 TABLET ORAL AT BEDTIME
Refills: 0 | Status: DISCONTINUED | OUTPATIENT
Start: 2023-12-18 | End: 2023-12-19

## 2023-12-18 RX ORDER — ONDANSETRON 8 MG/1
4 TABLET, FILM COATED ORAL ONCE
Refills: 0 | Status: COMPLETED | OUTPATIENT
Start: 2023-12-18 | End: 2023-12-18

## 2023-12-18 RX ORDER — MORPHINE SULFATE 50 MG/1
2 CAPSULE, EXTENDED RELEASE ORAL EVERY 4 HOURS
Refills: 0 | Status: DISCONTINUED | OUTPATIENT
Start: 2023-12-18 | End: 2023-12-19

## 2023-12-18 RX ORDER — ALPRAZOLAM 0.25 MG
1 TABLET ORAL AT BEDTIME
Refills: 0 | Status: DISCONTINUED | OUTPATIENT
Start: 2023-12-18 | End: 2023-12-19

## 2023-12-18 RX ORDER — POLYETHYLENE GLYCOL 3350 17 G/17G
17 POWDER, FOR SOLUTION ORAL DAILY
Refills: 0 | Status: DISCONTINUED | OUTPATIENT
Start: 2023-12-18 | End: 2023-12-19

## 2023-12-18 RX ORDER — MORPHINE SULFATE 50 MG/1
4 CAPSULE, EXTENDED RELEASE ORAL EVERY 4 HOURS
Refills: 0 | Status: DISCONTINUED | OUTPATIENT
Start: 2023-12-18 | End: 2023-12-19

## 2023-12-18 RX ORDER — INFLUENZA VIRUS VACCINE 15; 15; 15; 15 UG/.5ML; UG/.5ML; UG/.5ML; UG/.5ML
0.5 SUSPENSION INTRAMUSCULAR ONCE
Refills: 0 | Status: COMPLETED | OUTPATIENT
Start: 2023-12-18 | End: 2023-12-18

## 2023-12-18 RX ORDER — MORPHINE SULFATE 50 MG/1
4 CAPSULE, EXTENDED RELEASE ORAL ONCE
Refills: 0 | Status: DISCONTINUED | OUTPATIENT
Start: 2023-12-18 | End: 2023-12-18

## 2023-12-18 RX ADMIN — Medication 1000 MILLIGRAM(S): at 13:35

## 2023-12-18 RX ADMIN — POLYETHYLENE GLYCOL 3350 17 GRAM(S): 17 POWDER, FOR SOLUTION ORAL at 11:24

## 2023-12-18 RX ADMIN — Medication 1000 MILLIGRAM(S): at 13:00

## 2023-12-18 RX ADMIN — MORPHINE SULFATE 4 MILLIGRAM(S): 50 CAPSULE, EXTENDED RELEASE ORAL at 03:58

## 2023-12-18 RX ADMIN — SODIUM CHLORIDE 250 MILLILITER(S): 9 INJECTION, SOLUTION INTRAVENOUS at 17:47

## 2023-12-18 RX ADMIN — ONDANSETRON 4 MILLIGRAM(S): 8 TABLET, FILM COATED ORAL at 08:45

## 2023-12-18 RX ADMIN — SODIUM CHLORIDE 1000 MILLILITER(S): 9 INJECTION INTRAMUSCULAR; INTRAVENOUS; SUBCUTANEOUS at 02:33

## 2023-12-18 RX ADMIN — MORPHINE SULFATE 4 MILLIGRAM(S): 50 CAPSULE, EXTENDED RELEASE ORAL at 08:45

## 2023-12-18 RX ADMIN — MORPHINE SULFATE 4 MILLIGRAM(S): 50 CAPSULE, EXTENDED RELEASE ORAL at 06:08

## 2023-12-18 RX ADMIN — SENNA PLUS 2 TABLET(S): 8.6 TABLET ORAL at 21:41

## 2023-12-18 RX ADMIN — SODIUM CHLORIDE 250 MILLILITER(S): 9 INJECTION, SOLUTION INTRAVENOUS at 13:04

## 2023-12-18 RX ADMIN — Medication 1000 MILLIGRAM(S): at 21:41

## 2023-12-18 RX ADMIN — SODIUM CHLORIDE 250 MILLILITER(S): 9 INJECTION, SOLUTION INTRAVENOUS at 07:23

## 2023-12-18 RX ADMIN — Medication 1 MILLIGRAM(S): at 21:41

## 2023-12-18 RX ADMIN — MORPHINE SULFATE 4 MILLIGRAM(S): 50 CAPSULE, EXTENDED RELEASE ORAL at 02:28

## 2023-12-18 RX ADMIN — Medication 1000 MILLIGRAM(S): at 03:58

## 2023-12-18 RX ADMIN — MORPHINE SULFATE 4 MILLIGRAM(S): 50 CAPSULE, EXTENDED RELEASE ORAL at 09:38

## 2023-12-18 RX ADMIN — Medication 1000 MILLIGRAM(S): at 22:00

## 2023-12-18 NOTE — H&P ADULT - NSICDXFAMILYHX_GEN_ALL_CORE_FT
FAMILY HISTORY:  FH: breast cancer  FH: colon cancer  FH: COPD (chronic obstructive pulmonary disease)

## 2023-12-18 NOTE — H&P ADULT - NSHPREVIEWOFSYSTEMS_GEN_ALL_CORE
CONSTITUTIONAL: No fever, weight loss, or fatigue  EYES: No eye pain, visual disturbances, or discharge  ENT:  No difficulty hearing, tinnitus, vertigo; No sinus or throat pain  NECK: No pain or stiffness  RESPIRATORY: No cough, wheezing, chills or hemoptysis; No Shortness of Breath  CARDIOVASCULAR: No chest pain, palpitations, passing out, dizziness, or leg swelling  GASTROINTESTINAL: + abdominal and epigastric pain. + nausea, No vomiting, or hematemesis; No diarrhea or constipation. No melena or hematochezia.  GENITOURINARY: No dysuria, frequency, hematuria, or incontinence  NEUROLOGICAL: No headaches, memory loss, loss of strength, numbness, or tremors  SKIN: No itching, burning, rashes, or lesions   LYMPH Nodes: No enlarged glands  ENDOCRINE: No heat or cold intolerance; No hair loss  MUSCULOSKELETAL: No joint pain or swelling; No muscle, back, No extremity pain  PSYCHIATRIC: No depression, anxiety, mood swings, or difficulty sleeping  HEME/LYMPH: No easy bruising, or bleeding gums  ALLERGY AND IMMUNOLOGIC: No hives or eczema

## 2023-12-18 NOTE — H&P ADULT - PROBLEM SELECTOR PLAN 2
- Patient takes Metformin at home  - will hold home medications  - will start sliding scale  - diabetic diet

## 2023-12-18 NOTE — PATIENT PROFILE ADULT - FUNCTIONAL ASSESSMENT - BASIC MOBILITY 1.
Left message instructing patient to call dept @ [phone] between 8am-4pm.    Arrival time to be given @ 0700  2nd dose of prep at 0200 then clears until 0400 then completely NPO.  (Message sent via [facility] portal)     4 = No assist / stand by assistance

## 2023-12-18 NOTE — PATIENT PROFILE ADULT - FALL HARM RISK - CONCLUSION
"  Patient ID: Daryn Ceja is a 9 y.o. male who presents with Mom for Illness.        HPI    Comes in today with mom.  Said a deep cough for going on a week.  Seems to be more in his chest.  He had a history of reactive airway disease when he was a baby.  Mom gave him 1 puff of her inhaler which seem to help \"a little bit\".    Review of Systems    EYES: No injection no drainage  ENT: As in history of present illness  GI: No N/V/D  RESP:As in history of present illness  CV: No chest pain, palpitations  Neuro: Normal  SKIN: No rash or lesions    Objective   Temp 36 °C (96.8 °F)   Wt 30.8 kg   BSA: There is no height or weight on file to calculate BSA.  Growth percentiles: No height on file for this encounter. 19 %ile (Z= -0.88) based on CDC (Boys, 2-20 Years) weight-for-age data using vitals from 11/20/2023.       Physical Exam    Const: No fever  Eye: Pupils are equal and reactive.  Ears:  Right TM is clear.  Left TM is clear.  Nose: Mild rhinorrhea  Mouth: Moist membranes, no erythema  Neck: No adenopathy, normal thyroid.  Heart: Regular rate and rhythm.  Lungs: Coarse and expiratory wheeze.  Abdomen: Soft, Non-tender, Non-distended, Normal bowel sounds.    ASSESSMENT and PLAN:    Diagnoses and all orders for this visit:  Wheezing-associated respiratory infection (WARI)  -     inhalat.spacing dev,med. mask spacer; Use as directed with inhaler  -     albuterol 90 mcg/actuation inhaler; Inhale 2 puffs every 4 hours if needed for wheezing.    Normal progression and time course of diagnosis were discussed.         All questions were answered. I have asked them to call me as needed with an update. They of course can call me sooner if they have any questions or further concerns.            "
Fall with Harm Risk

## 2023-12-18 NOTE — H&P ADULT - NSHPPHYSICALEXAM_GEN_ALL_CORE
GENERAL: NAD, well-groomed, well-developed  HEAD:  Atraumatic, Normocephalic  EYES: EOMI, PERRLA, conjunctiva and sclera clear  ENMT: No tonsillar erythema, exudates, or enlargement; Moist mucous membranes, Good dentition, No lesions  NECK: Supple, normal appearance, No JVD; Normal thyroid; Trachea midline  NERVOUS SYSTEM:  Alert & Oriented X3,  Motor Strength 5/5 B/L upper and lower extremities, sensation intact  CHEST/LUNG: Lungs clear to auscultation bilaterally, No rales, rhonchi, wheezing   HEART: Regular rate and rhythm; No murmurs, rubs, or gallops  ABDOMEN: Soft, mild tenderness to palpation in the epigastric area, Nondistended; Bowel sounds present  EXTREMITIES:  2+ Peripheral Pulses, No clubbing, cyanosis, or edema  LYMPH: No lymphadenopathy noted  SKIN: No rashes or lesions;  Good capillary refill

## 2023-12-18 NOTE — ED ADULT NURSE NOTE - ED STAT RN HANDOFF DETAILS
Patient admitted to medicine in no acute distress, meds to be administered prior to transfer endorsed to hold RN.

## 2023-12-18 NOTE — H&P ADULT - ASSESSMENT
48-year-old man from home with a history of chronic pancreatitis, anxiety, and DM presenting complaining of upper abdominal pain like his prior exacerbations.  Pt was found to have lipase 765, CT A/P shows mild interstitial pancreatitis. Pt is admitted for pancreatitis.

## 2023-12-18 NOTE — H&P ADULT - HISTORY OF PRESENT ILLNESS
Subjective:      Adalberto Ocasio  is a 44 y.o. female referred by Dr. Allison Leong for evaluation of multinodular goiter. Pt had C-spine MRI on 20 that had incidental finding of multinodular goiter with largest nodule in the RIGHT thyroid lobe measuring 17 mm. Pt reports pain in the RIGHT side of her neck. She also notes soreness in the back of her throat. Past Medical History:   Diagnosis Date    Arthritis     Asthma     Seasonal     Bipolar disorder ([address]     IBS (irritable bowel syndrome)     Migraine     Peptic ulcer     PTSD (post-traumatic stress disorder)     Snoring     Thyroiditis 10/7/2020    TIA (transient ischemic attack)     As of 19, pt states she never had one and that her migraine caused symptoms of TIA       Past Surgical History:   Procedure Laterality Date    HX CERVICAL FUSION      C4-C7    HX CHOLECYSTECTOMY      HX COLONOSCOPY      HX CYST REMOVAL      from under both arms    HX HEENT Left 2013    orbital    HX HERNIA REPAIR  2009    HX MENISCUS REPAIR Left     x2    HX ORTHOPAEDIC Left     ORIF left fibula    HX TUBAL LIGATION      HX TUBAL LIGATION      HYSTEROSCOPY DIAGNOSTIC      x2       Social History     Tobacco Use    Smoking status: Current Some Day Smoker     Packs/day: 0.25     Last attempt to quit: 12/10/2019     Years since quittin.8    Smokeless tobacco: Never Used   Substance Use Topics    Alcohol use: Yes     Comment: rare       Family History   Problem Relation Age of Onset    Stroke Father     Hypertension Father     Heart Disease Maternal Grandmother     Cancer Maternal Grandmother         brain and colon    Cancer Maternal Uncle         5 w/ brain Birdia Stallion    MS Other         1st cousin    Bipolar Disorder Other        Current Outpatient Medications on File Prior to Visit   Medication Sig Dispense Refill    topiramate (TOPAMAX) 25 mg tablet Take 4 Tabs by mouth nightly.  Takes 4 tabs once a day 360 Tab 3    galcanezumab-gnlm (Emgality Syringe) 120 mg/mL syrg 120 mg by SubCUTAneous route every thirty (30) days. 1 Units 2    FENUGREEK SEED EXTRACT PO Take  by mouth.  KARINA EXTRACT PO Take  by mouth.  furosemide (Lasix) 40 mg tablet Si tab daily 30 Tab 6    albuterol (PROVENTIL HFA, VENTOLIN HFA, PROAIR HFA) 90 mcg/actuation inhaler INHALE 2 PUFFS BY MOUTH EVERY 4 HOURS AS NEEDED FOR WHEEZING 8.5 g 11    fluticasone (FLONASE ALLERGY RELIEF) 50 mcg/actuation nasal spray 2 Sprays by Both Nostrils route daily as needed for Rhinitis.  potassium chloride SR (KLOR-CON 10) 10 mEq tablet Take 20 mEq by mouth daily. With Lasix.  cyanocobalamin (VITAMIN B12) 100 mcg tablet Take 100 mcg by mouth daily.  therapeutic multivitamin (THERA) tablet Take 1 Tab by mouth daily.  Ferrous Sulfate (SLOW FE) 47.5 mg iron TbER tablet Take 1 Tab by mouth nightly.  dexAMETHasone (Decadron) 4 mg tablet Take 4 mg by mouth two (2) times daily (with meals). 20 Tab 0    tiZANidine (ZANAFLEX) 4 mg tablet Take 1 Tab by mouth nightly. (Patient not taking: Reported on 10/5/2020) 30 Tab 1    HOP-BLK COH-RHODIOLA-FLAX-PRIM PO Take  by mouth.  citalopram (CELEXA) 20 mg tablet Take 1 Tab by mouth daily. 30 Tab 3     No current facility-administered medications on file prior to visit.         Allergies   Allergen Reactions    Latex Hives    Other Food Rash     All fruits except apple, oranges, and pineapple (recorded as Other Medication 2012)    Peanut Swelling    Shellfish Containing Products Anaphylaxis    Morphine Itching     She has had morphine but premedicates with benadryl    Nystatin Rash    Flagyl [Metronidazole] Contact Dermatitis    Flexeril [Cyclobenzaprine] Rash    Other Medication Rash     All fruits except apple, oranges, and pineapple    Phenergan [Promethazine] Other (comments)     Rapid hr    Robaxin [Methocarbamol] Rash       Review of Systems:    A comprehensive review of systems was negative except for that written in the History of Present Illness. Objective:     Visit Vitals  /78 (BP 1 Location: Left arm, BP Patient Position: Sitting)   Pulse (!) 57   Temp 98.2 °F (36.8 °C) (Oral)   Resp 18   Ht 5' 3.5\" (1.613 m)   Wt 267 lb (121.1 kg)   SpO2 99%   BMI 46.56 kg/m²        Physical Exam:  NECK: RIGHT lobe of thyroid is not enlarged but is tender. LEFT lobe is normal. No cervical adenopathy. Labs: No results found for this or any previous visit (from the past 24 hour(s)). Data Review:      MRI c-spine wo contrast 09/28/20: IMPRESSION:    1. Stable postsurgical changes of C4-C7 ACDF without significant spinal canal or neural foraminal stenosis at the surgical levels. 2. Mild bilateral neural foraminal stenosis at C3-C4, unchanged. 3. Stable multinodular thyroid gland  *Stable multinodular thyroid gland, largest nodule in the right thyroid lobe measuring 17 mm. Assessment and Plan:       ICD-10-CM ICD-9-CM    1. Thyroiditis  E06.9 245.9 US THYROID/PARATHYROID/SOFT TISS       Suspect thyroiditis. Will order US thyroid/parathyroid for further evaluation. Depending on what that shows, pt may also need FNA. Will f/u with the results for further planning. All questions were answered and pt is in agreement with this plan. This document was scribed by Abdi Hickey as dictated by Dr. Harinder Cohen.      Signed By: Louise Marie MD     10/07/20 48-year-old man from home with a history of chronic pancreatitis, anxiety, and DM presenting complaining of upper abdominal pain like his prior exacerbations.  He reports that his prior pancreatitis was induced by drinking but that he was not drinking this time.  He does report "eating like a pig" which he believes is the source.  The symptoms began 1 to 2 days ago and he thought he could manage it at home but tonight his pain became more significant. Pt complains of N, epigastric pain 10/10. He denies any other complains. Pt was found to have lipase 765, CT A/P shows mild interstitial pancreatitis. Pt is admitted for pancreatitis.

## 2023-12-18 NOTE — PATIENT PROFILE ADULT - FALL HARM RISK - HARM RISK INTERVENTIONS
Communicate Risk of Fall with Harm to all staff/Reinforce activity limits and safety measures with patient and family/Tailored Fall Risk Interventions/Visual Cue: Yellow wristband and red socks/Bed in lowest position, wheels locked, appropriate side rails in place/Call bell, personal items and telephone in reach/Instruct patient to call for assistance before getting out of bed or chair/Non-slip footwear when patient is out of bed/[place] to call system/Physically safe environment - no spills, clutter or unnecessary equipment/Purposeful Proactive Rounding/Room/bathroom lighting operational, light cord in reach Communicate Risk of Fall with Harm to all staff/Reinforce activity limits and safety measures with patient and family/Tailored Fall Risk Interventions/Visual Cue: Yellow wristband and red socks/Bed in lowest position, wheels locked, appropriate side rails in place/Call bell, personal items and telephone in reach/Instruct patient to call for assistance before getting out of bed or chair/Non-slip footwear when patient is out of bed/[place] to call system/Physically safe environment - no spills, clutter or unnecessary equipment/Purposeful Proactive Rounding/Room/bathroom lighting operational, light cord in reach

## 2023-12-18 NOTE — H&P ADULT - PROBLEM SELECTOR PLAN 1
p/w abdominal pain + lipase elevated  ct shows mild interstitial pancreatitis.  aggressive fluid hydration - lr 250cc/hr  CLD  ADA  zofran 4mg ivp q4 prn n/v  pain management - tylenol,  morphine

## 2023-12-18 NOTE — PATIENT PROFILE ADULT - MST SCORE
"[facility] CMV Prevention (Kidney Transplant)  Protocol: [id number]  IRB# [id number]  PI: Nicholas GUAN)  Site: [id number]  Subject #[id number]    Visit 14 Week 32:     Date of Visit: 03Sep2019    Subject came into clinic for Visit 14 Wk 32 Visit and seen by CRC, Alda Bowen. Subject wishes to continue with voluntary participation in trial as well as still meets all inclusion & no exclusion criteria.      Patient has had a change in health and meds since last visit. Subject went to the ED on 8/28/19 for mid-right back pain. Patient was not admitted & discharged prior to 24hrs. Pt describes pain as "different than pain from preceding back surgeries, which was lower back pain. Doctors still don't know what is wrong with me, I think its a pinched nerve. The pain is coming from my mid-right side and feels muscular." Information regarding onset of symptoms, meds given during hospital visit, as well as take home meds will be updated in AE log and in conmeds.  Open AE's reviewed with patient: diarrhea and parvo still ongoing. No other changes in meds. Vital signs obtained. BP slightly elevated (140/88)- likely due to pt being in pain. Child Pierre Score will be assessed once final labs have been received. Health Outcomes and Renal Transplant outcomes assessed. No pregnancy test required due to patient not a WOCBP.  See PI/Sub-I's ROS note for patients physical exam information.     Urine sample & labs collected and processed according to protocol specific lab manual guidelines. See subject study binder for blood sample and shipping information.      Subject left clinic in stable condition, all questions answered, contact information given and advise to call if have any questions regarding clinical trial. Patients next appointment has been scheduled for 9/30/19.   "
0

## 2023-12-18 NOTE — ED ADULT NURSE NOTE - NSFALLUNIVINTERV_ED_ALL_ED
Bed/Stretcher in lowest position, wheels locked, appropriate side rails in place/Call bell, personal items and telephone in reach/Instruct patient to call for assistance before getting out of bed/chair/stretcher/Non-slip footwear applied when patient is off stretcher/[place] to call system/Physically safe environment - no spills, clutter or unnecessary equipment/Purposeful proactive rounding/Room/bathroom lighting operational, light cord in reach Bed/Stretcher in lowest position, wheels locked, appropriate side rails in place/Call bell, personal items and telephone in reach/Instruct patient to call for assistance before getting out of bed/chair/stretcher/Non-slip footwear applied when patient is off stretcher/[place] to call system/Physically safe environment - no spills, clutter or unnecessary equipment/Purposeful proactive rounding/Room/bathroom lighting operational, light cord in reach

## 2023-12-19 ENCOUNTER — TRANSCRIPTION ENCOUNTER (OUTPATIENT)
Age: 48
End: 2023-12-19

## 2023-12-19 VITALS
RESPIRATION RATE: 16 BRPM | OXYGEN SATURATION: 95 % | SYSTOLIC BLOOD PRESSURE: 121 MMHG | TEMPERATURE: 98 F | HEART RATE: 74 BPM | DIASTOLIC BLOOD PRESSURE: 75 MMHG

## 2023-12-19 LAB
ANION GAP SERPL CALC-SCNC: 7 MMOL/L — SIGNIFICANT CHANGE UP (ref 5–17)
ANION GAP SERPL CALC-SCNC: 7 MMOL/L — SIGNIFICANT CHANGE UP (ref 5–17)
BUN SERPL-MCNC: 7 MG/DL — SIGNIFICANT CHANGE UP (ref 7–18)
BUN SERPL-MCNC: 7 MG/DL — SIGNIFICANT CHANGE UP (ref 7–18)
CALCIUM SERPL-MCNC: 8.5 MG/DL — SIGNIFICANT CHANGE UP (ref 8.4–10.5)
CALCIUM SERPL-MCNC: 8.5 MG/DL — SIGNIFICANT CHANGE UP (ref 8.4–10.5)
CHLORIDE SERPL-SCNC: 107 MMOL/L — SIGNIFICANT CHANGE UP (ref 96–108)
CHLORIDE SERPL-SCNC: 107 MMOL/L — SIGNIFICANT CHANGE UP (ref 96–108)
CO2 SERPL-SCNC: 25 MMOL/L — SIGNIFICANT CHANGE UP (ref 22–31)
CO2 SERPL-SCNC: 25 MMOL/L — SIGNIFICANT CHANGE UP (ref 22–31)
CREAT SERPL-MCNC: 0.92 MG/DL — SIGNIFICANT CHANGE UP (ref 0.5–1.3)
CREAT SERPL-MCNC: 0.92 MG/DL — SIGNIFICANT CHANGE UP (ref 0.5–1.3)
EGFR: 101 ML/MIN/1.73M2 — SIGNIFICANT CHANGE UP
EGFR: 101 ML/MIN/1.73M2 — SIGNIFICANT CHANGE UP
GLUCOSE BLDC GLUCOMTR-MCNC: 132 MG/DL — HIGH (ref 70–99)
GLUCOSE BLDC GLUCOMTR-MCNC: 132 MG/DL — HIGH (ref 70–99)
GLUCOSE BLDC GLUCOMTR-MCNC: 88 MG/DL — SIGNIFICANT CHANGE UP (ref 70–99)
GLUCOSE BLDC GLUCOMTR-MCNC: 88 MG/DL — SIGNIFICANT CHANGE UP (ref 70–99)
GLUCOSE SERPL-MCNC: 84 MG/DL — SIGNIFICANT CHANGE UP (ref 70–99)
GLUCOSE SERPL-MCNC: 84 MG/DL — SIGNIFICANT CHANGE UP (ref 70–99)
HCT VFR BLD CALC: 46.5 % — SIGNIFICANT CHANGE UP (ref 39–50)
HCT VFR BLD CALC: 46.5 % — SIGNIFICANT CHANGE UP (ref 39–50)
HGB BLD-MCNC: 15.3 G/DL — SIGNIFICANT CHANGE UP (ref 13–17)
HGB BLD-MCNC: 15.3 G/DL — SIGNIFICANT CHANGE UP (ref 13–17)
IGG FLD-MCNC: 850 MG/DL — SIGNIFICANT CHANGE UP (ref 610–1660)
IGG FLD-MCNC: 850 MG/DL — SIGNIFICANT CHANGE UP (ref 610–1660)
IGG1 SER-MCNC: 478 MG/DL — SIGNIFICANT CHANGE UP (ref 240–1118)
IGG1 SER-MCNC: 478 MG/DL — SIGNIFICANT CHANGE UP (ref 240–1118)
IGG2 SER-MCNC: 298 MG/DL — SIGNIFICANT CHANGE UP (ref 124–549)
IGG2 SER-MCNC: 298 MG/DL — SIGNIFICANT CHANGE UP (ref 124–549)
IGG3 SER-MCNC: 60.4 MG/DL — SIGNIFICANT CHANGE UP (ref 15–102)
IGG3 SER-MCNC: 60.4 MG/DL — SIGNIFICANT CHANGE UP (ref 15–102)
IGG4 SER-MCNC: 30.7 MG/DL — SIGNIFICANT CHANGE UP (ref 1–123)
IGG4 SER-MCNC: 30.7 MG/DL — SIGNIFICANT CHANGE UP (ref 1–123)
MAGNESIUM SERPL-MCNC: 2 MG/DL — SIGNIFICANT CHANGE UP (ref 1.6–2.6)
MAGNESIUM SERPL-MCNC: 2 MG/DL — SIGNIFICANT CHANGE UP (ref 1.6–2.6)
MCHC RBC-ENTMCNC: 27.4 PG — SIGNIFICANT CHANGE UP (ref 27–34)
MCHC RBC-ENTMCNC: 27.4 PG — SIGNIFICANT CHANGE UP (ref 27–34)
MCHC RBC-ENTMCNC: 32.9 GM/DL — SIGNIFICANT CHANGE UP (ref 32–36)
MCHC RBC-ENTMCNC: 32.9 GM/DL — SIGNIFICANT CHANGE UP (ref 32–36)
MCV RBC AUTO: 83.2 FL — SIGNIFICANT CHANGE UP (ref 80–100)
MCV RBC AUTO: 83.2 FL — SIGNIFICANT CHANGE UP (ref 80–100)
NRBC # BLD: 0 /100 WBCS — SIGNIFICANT CHANGE UP (ref 0–0)
NRBC # BLD: 0 /100 WBCS — SIGNIFICANT CHANGE UP (ref 0–0)
PHOSPHATE SERPL-MCNC: 2.8 MG/DL — SIGNIFICANT CHANGE UP (ref 2.5–4.5)
PHOSPHATE SERPL-MCNC: 2.8 MG/DL — SIGNIFICANT CHANGE UP (ref 2.5–4.5)
PLATELET # BLD AUTO: 234 K/UL — SIGNIFICANT CHANGE UP (ref 150–400)
PLATELET # BLD AUTO: 234 K/UL — SIGNIFICANT CHANGE UP (ref 150–400)
POTASSIUM SERPL-MCNC: 3.8 MMOL/L — SIGNIFICANT CHANGE UP (ref 3.5–5.3)
POTASSIUM SERPL-MCNC: 3.8 MMOL/L — SIGNIFICANT CHANGE UP (ref 3.5–5.3)
POTASSIUM SERPL-SCNC: 3.8 MMOL/L — SIGNIFICANT CHANGE UP (ref 3.5–5.3)
POTASSIUM SERPL-SCNC: 3.8 MMOL/L — SIGNIFICANT CHANGE UP (ref 3.5–5.3)
RBC # BLD: 5.59 M/UL — SIGNIFICANT CHANGE UP (ref 4.2–5.8)
RBC # BLD: 5.59 M/UL — SIGNIFICANT CHANGE UP (ref 4.2–5.8)
RBC # FLD: 12.9 % — SIGNIFICANT CHANGE UP (ref 10.3–14.5)
RBC # FLD: 12.9 % — SIGNIFICANT CHANGE UP (ref 10.3–14.5)
SODIUM SERPL-SCNC: 139 MMOL/L — SIGNIFICANT CHANGE UP (ref 135–145)
SODIUM SERPL-SCNC: 139 MMOL/L — SIGNIFICANT CHANGE UP (ref 135–145)
WBC # BLD: 9.44 K/UL — SIGNIFICANT CHANGE UP (ref 3.8–10.5)
WBC # BLD: 9.44 K/UL — SIGNIFICANT CHANGE UP (ref 3.8–10.5)
WBC # FLD AUTO: 9.44 K/UL — SIGNIFICANT CHANGE UP (ref 3.8–10.5)
WBC # FLD AUTO: 9.44 K/UL — SIGNIFICANT CHANGE UP (ref 3.8–10.5)

## 2023-12-19 PROCEDURE — 99239 HOSP IP/OBS DSCHRG MGMT >30: CPT

## 2023-12-19 PROCEDURE — 84100 ASSAY OF PHOSPHORUS: CPT

## 2023-12-19 PROCEDURE — 82784 ASSAY IGA/IGD/IGG/IGM EACH: CPT

## 2023-12-19 PROCEDURE — 96374 THER/PROPH/DIAG INJ IV PUSH: CPT

## 2023-12-19 PROCEDURE — 80061 LIPID PANEL: CPT

## 2023-12-19 PROCEDURE — 82787 IGG 1 2 3 OR 4 EACH: CPT

## 2023-12-19 PROCEDURE — 83735 ASSAY OF MAGNESIUM: CPT

## 2023-12-19 PROCEDURE — 96376 TX/PRO/DX INJ SAME DRUG ADON: CPT

## 2023-12-19 PROCEDURE — 83690 ASSAY OF LIPASE: CPT

## 2023-12-19 PROCEDURE — 93005 ELECTROCARDIOGRAM TRACING: CPT

## 2023-12-19 PROCEDURE — 80048 BASIC METABOLIC PNL TOTAL CA: CPT

## 2023-12-19 PROCEDURE — 36415 COLL VENOUS BLD VENIPUNCTURE: CPT

## 2023-12-19 PROCEDURE — 76705 ECHO EXAM OF ABDOMEN: CPT

## 2023-12-19 PROCEDURE — 85027 COMPLETE CBC AUTOMATED: CPT

## 2023-12-19 PROCEDURE — 74177 CT ABD & PELVIS W/CONTRAST: CPT | Mod: MA

## 2023-12-19 PROCEDURE — 80053 COMPREHEN METABOLIC PANEL: CPT

## 2023-12-19 PROCEDURE — 96375 TX/PRO/DX INJ NEW DRUG ADDON: CPT

## 2023-12-19 PROCEDURE — 82962 GLUCOSE BLOOD TEST: CPT

## 2023-12-19 PROCEDURE — 85025 COMPLETE CBC W/AUTO DIFF WBC: CPT

## 2023-12-19 PROCEDURE — 99285 EMERGENCY DEPT VISIT HI MDM: CPT | Mod: 25

## 2023-12-19 RX ORDER — SODIUM CHLORIDE 9 MG/ML
1000 INJECTION, SOLUTION INTRAVENOUS
Refills: 0 | Status: DISCONTINUED | OUTPATIENT
Start: 2023-12-19 | End: 2023-12-19

## 2023-12-19 RX ORDER — ACETAMINOPHEN 500 MG
2 TABLET ORAL
Qty: 0 | Refills: 0 | DISCHARGE
Start: 2023-12-19

## 2023-12-19 NOTE — DISCHARGE NOTE NURSING/CASE MANAGEMENT/SOCIAL WORK - PATIENT PORTAL LINK FT
You can access the FollowMyHealth Patient Portal offered by [facility] by registering at the following website: [email or web address]. By joining [employer]’s FollowMyHealth portal, you will also be able to view your health information using other applications (apps) compatible with our system. You can access the FollowMyHealth Patient Portal offered by [facility] by registering at the following website: [email or web address]. By joining [employer]’s FollowMyHealth portal, you will also be able to view your health information using other applications (apps) compatible with our system.

## 2023-12-19 NOTE — DISCHARGE NOTE PROVIDER - NSDCPNSUBOBJ_GEN_ALL_CORE
patient feels well, denies any abd pain , n/v/d. Tolerated po diet well during both breakfast and lunch. stable to [place] home  counseled to fzarina with GI as outpatient   patient feels well, denies any abd pain , n/v/d. Tolerated po diet well during both breakfast and lunch. [facility] to [facility]  counseled to fzarina with GI as outpatient

## 2023-12-19 NOTE — DISCHARGE NOTE PROVIDER - NSDCMRMEDTOKEN_GEN_ALL_CORE_FT
metFORMIN 500 mg oral tablet: 1 tab(s) orally once a day  Xanax 1 mg oral tablet: 1 tab(s) orally once a day (at bedtime)   acetaminophen 500 mg oral tablet: 2 tab(s) orally every 8 hours as needed for  moderate pain  metFORMIN 500 mg oral tablet: 1 tab(s) orally once a day  Xanax 1 mg oral tablet: 1 tab(s) orally once a day (at bedtime)

## 2023-12-19 NOTE — DISCHARGE NOTE PROVIDER - NSDCCPCAREPLAN_GEN_ALL_CORE_FT
PRINCIPAL DISCHARGE DIAGNOSIS  Diagnosis: Pancreatitis  Assessment and Plan of Treatment: Your pain has resolved.  You had a CT abdomen and abdominal US consistent with pancreatitis.  Please take small meals and over the counter tylenol for pain management.  Please seek immediate help if you exhibit from the problem site if:  -fever, chills, shivering  -rapid heart rate, difficulty breathing  -worsening redness, drainage, foul odor  Please follow up with your Gastroenetrologist and PRimary Care Physician in 1 week for further medical management.      SECONDARY DISCHARGE DIAGNOSES  Diagnosis: DM (diabetes mellitus)  Assessment and Plan of Treatment: Please continue taking your medication as prescribed. If you have any questions or concerns about your medication please direct them to your prescribing Healthcare Provider.      Diagnosis: Anxiety  Assessment and Plan of Treatment: Please continue taking your medication as prescribed. If you have any questions or concerns about your medication please direct them to your prescribing Healthcare Provider.

## 2023-12-19 NOTE — DISCHARGE NOTE PROVIDER - HOSPITAL COURSE
48 year old man with hx of prior hx of recurrent alcoholic pancreatitis no longer an active alcohol user here with clinical, lab and imaging findings keeping with acute pancreatitis. OF note, similar presentation in 2017 of unclear etiology.    CT abdomen  Mild acute interstitial pancreatitis with secondary inflammation of the   duodenum.      Problems  # Acute pancreatitis  # DM2  # COPD   # rheumatoid arthritis  Patient treated with IVF and NPO, advanced diet as tolerable. Patient acute pancreatitis resolved.     Please note that this a brief summary of hospital course please refer to daily progress notes and consult notes for full course and events. Patient seen and examined at bedside, discussed with medical attending. Patient medically cleared for discharge to home with outpatient follow up with GI and PCP.

## 2023-12-19 NOTE — DISCHARGE NOTE NURSING/CASE MANAGEMENT/SOCIAL WORK - NSDCPEFALRISK_GEN_ALL_CORE
For information on Fall & Injury Prevention, visit: [email or web address] OR  [email or web address] OR  https://www.cdc.gov/steadi/patient.html For information on Fall & Injury Prevention, visit: [email or web address]://[email or web address]/[email or web address] OR  https://[email or web address]/[email or web address]-[email or web address]-[email or web address]-injury OR  https://www.cdc.gov/steadi/patient.html

## 2023-12-19 NOTE — DISCHARGE NOTE PROVIDER - CARE PROVIDER_API CALL
KAVON BRYANT, KOLBY  [address] [place] [address]  Phone: [phone]  Fax: [phone]  Follow Up Time:    KAVON BRYANT, KOLBY  [address] [place] [address]  Phone: [phone]  Fax: [phone]  Follow Up Time:

## 2024-01-03 ENCOUNTER — INPATIENT (INPATIENT)
Facility: HOSPITAL | Age: 49
LOS: 8 days | Discharge: HOME | End: 2024-01-12
Attending: HOSPITALIST | Admitting: HOSPITALIST
Payer: COMMERCIAL

## 2024-01-03 VITALS
HEART RATE: 86 BPM | DIASTOLIC BLOOD PRESSURE: 91 MMHG | TEMPERATURE: 98 F | SYSTOLIC BLOOD PRESSURE: 175 MMHG | HEIGHT: 70 IN | RESPIRATION RATE: 18 BRPM | OXYGEN SATURATION: 100 %

## 2024-01-03 DIAGNOSIS — K85.90 ACUTE PANCREATITIS WITHOUT NECROSIS OR INFECTION, UNSPECIFIED: ICD-10-CM

## 2024-01-03 PROBLEM — E11.9 TYPE 2 DIABETES MELLITUS WITHOUT COMPLICATIONS: Chronic | Status: ACTIVE | Noted: 2023-12-18

## 2024-01-03 PROBLEM — F41.9 ANXIETY DISORDER, UNSPECIFIED: Chronic | Status: ACTIVE | Noted: 2023-12-18

## 2024-01-03 LAB
ALBUMIN SERPL ELPH-MCNC: 4.5 G/DL — SIGNIFICANT CHANGE UP (ref 3.3–5)
ALBUMIN SERPL ELPH-MCNC: 4.5 G/DL — SIGNIFICANT CHANGE UP (ref 3.3–5)
ALP SERPL-CCNC: 70 U/L — SIGNIFICANT CHANGE UP (ref 40–120)
ALP SERPL-CCNC: 70 U/L — SIGNIFICANT CHANGE UP (ref 40–120)
ALT FLD-CCNC: 33 U/L — SIGNIFICANT CHANGE UP (ref 4–41)
ALT FLD-CCNC: 33 U/L — SIGNIFICANT CHANGE UP (ref 4–41)
ANION GAP SERPL CALC-SCNC: 14 MMOL/L — SIGNIFICANT CHANGE UP (ref 7–14)
ANION GAP SERPL CALC-SCNC: 14 MMOL/L — SIGNIFICANT CHANGE UP (ref 7–14)
AST SERPL-CCNC: 21 U/L — SIGNIFICANT CHANGE UP (ref 4–40)
AST SERPL-CCNC: 21 U/L — SIGNIFICANT CHANGE UP (ref 4–40)
BASOPHILS # BLD AUTO: 0.04 K/UL — SIGNIFICANT CHANGE UP (ref 0–0.2)
BASOPHILS # BLD AUTO: 0.04 K/UL — SIGNIFICANT CHANGE UP (ref 0–0.2)
BASOPHILS NFR BLD AUTO: 0.3 % — SIGNIFICANT CHANGE UP (ref 0–2)
BASOPHILS NFR BLD AUTO: 0.3 % — SIGNIFICANT CHANGE UP (ref 0–2)
BILIRUB SERPL-MCNC: 0.6 MG/DL — SIGNIFICANT CHANGE UP (ref 0.2–1.2)
BILIRUB SERPL-MCNC: 0.6 MG/DL — SIGNIFICANT CHANGE UP (ref 0.2–1.2)
BUN SERPL-MCNC: 8 MG/DL — SIGNIFICANT CHANGE UP (ref 7–23)
BUN SERPL-MCNC: 8 MG/DL — SIGNIFICANT CHANGE UP (ref 7–23)
CALCIUM SERPL-MCNC: 9.3 MG/DL — SIGNIFICANT CHANGE UP (ref 8.4–10.5)
CALCIUM SERPL-MCNC: 9.3 MG/DL — SIGNIFICANT CHANGE UP (ref 8.4–10.5)
CHLORIDE SERPL-SCNC: 99 MMOL/L — SIGNIFICANT CHANGE UP (ref 98–107)
CHLORIDE SERPL-SCNC: 99 MMOL/L — SIGNIFICANT CHANGE UP (ref 98–107)
CO2 SERPL-SCNC: 24 MMOL/L — SIGNIFICANT CHANGE UP (ref 22–31)
CO2 SERPL-SCNC: 24 MMOL/L — SIGNIFICANT CHANGE UP (ref 22–31)
CREAT SERPL-MCNC: 0.89 MG/DL — SIGNIFICANT CHANGE UP (ref 0.5–1.3)
CREAT SERPL-MCNC: 0.89 MG/DL — SIGNIFICANT CHANGE UP (ref 0.5–1.3)
EGFR: 104 ML/MIN/1.73M2 — SIGNIFICANT CHANGE UP
EGFR: 104 ML/MIN/1.73M2 — SIGNIFICANT CHANGE UP
EOSINOPHIL # BLD AUTO: 0.02 K/UL — SIGNIFICANT CHANGE UP (ref 0–0.5)
EOSINOPHIL # BLD AUTO: 0.02 K/UL — SIGNIFICANT CHANGE UP (ref 0–0.5)
EOSINOPHIL NFR BLD AUTO: 0.2 % — SIGNIFICANT CHANGE UP (ref 0–6)
EOSINOPHIL NFR BLD AUTO: 0.2 % — SIGNIFICANT CHANGE UP (ref 0–6)
GLUCOSE SERPL-MCNC: 117 MG/DL — HIGH (ref 70–99)
GLUCOSE SERPL-MCNC: 117 MG/DL — HIGH (ref 70–99)
HCT VFR BLD CALC: 47.9 % — SIGNIFICANT CHANGE UP (ref 39–50)
HCT VFR BLD CALC: 47.9 % — SIGNIFICANT CHANGE UP (ref 39–50)
HGB BLD-MCNC: 16 G/DL — SIGNIFICANT CHANGE UP (ref 13–17)
HGB BLD-MCNC: 16 G/DL — SIGNIFICANT CHANGE UP (ref 13–17)
IANC: 10.18 K/UL — HIGH (ref 1.8–7.4)
IANC: 10.18 K/UL — HIGH (ref 1.8–7.4)
IMM GRANULOCYTES NFR BLD AUTO: 0.3 % — SIGNIFICANT CHANGE UP (ref 0–0.9)
IMM GRANULOCYTES NFR BLD AUTO: 0.3 % — SIGNIFICANT CHANGE UP (ref 0–0.9)
LIDOCAIN IGE QN: 1282 U/L — HIGH (ref 7–60)
LIDOCAIN IGE QN: 1282 U/L — HIGH (ref 7–60)
LYMPHOCYTES # BLD AUTO: 15.8 % — SIGNIFICANT CHANGE UP (ref 13–44)
LYMPHOCYTES # BLD AUTO: 15.8 % — SIGNIFICANT CHANGE UP (ref 13–44)
LYMPHOCYTES # BLD AUTO: 2.04 K/UL — SIGNIFICANT CHANGE UP (ref 1–3.3)
LYMPHOCYTES # BLD AUTO: 2.04 K/UL — SIGNIFICANT CHANGE UP (ref 1–3.3)
MCHC RBC-ENTMCNC: 27.8 PG — SIGNIFICANT CHANGE UP (ref 27–34)
MCHC RBC-ENTMCNC: 27.8 PG — SIGNIFICANT CHANGE UP (ref 27–34)
MCHC RBC-ENTMCNC: 33.4 GM/DL — SIGNIFICANT CHANGE UP (ref 32–36)
MCHC RBC-ENTMCNC: 33.4 GM/DL — SIGNIFICANT CHANGE UP (ref 32–36)
MCV RBC AUTO: 83.2 FL — SIGNIFICANT CHANGE UP (ref 80–100)
MCV RBC AUTO: 83.2 FL — SIGNIFICANT CHANGE UP (ref 80–100)
MONOCYTES # BLD AUTO: 0.59 K/UL — SIGNIFICANT CHANGE UP (ref 0–0.9)
MONOCYTES # BLD AUTO: 0.59 K/UL — SIGNIFICANT CHANGE UP (ref 0–0.9)
MONOCYTES NFR BLD AUTO: 4.6 % — SIGNIFICANT CHANGE UP (ref 2–14)
MONOCYTES NFR BLD AUTO: 4.6 % — SIGNIFICANT CHANGE UP (ref 2–14)
NEUTROPHILS # BLD AUTO: 10.18 K/UL — HIGH (ref 1.8–7.4)
NEUTROPHILS # BLD AUTO: 10.18 K/UL — HIGH (ref 1.8–7.4)
NEUTROPHILS NFR BLD AUTO: 78.8 % — HIGH (ref 43–77)
NEUTROPHILS NFR BLD AUTO: 78.8 % — HIGH (ref 43–77)
NRBC # BLD: 0 /100 WBCS — SIGNIFICANT CHANGE UP (ref 0–0)
NRBC # BLD: 0 /100 WBCS — SIGNIFICANT CHANGE UP (ref 0–0)
NRBC # FLD: 0 K/UL — SIGNIFICANT CHANGE UP (ref 0–0)
NRBC # FLD: 0 K/UL — SIGNIFICANT CHANGE UP (ref 0–0)
PLATELET # BLD AUTO: 262 K/UL — SIGNIFICANT CHANGE UP (ref 150–400)
PLATELET # BLD AUTO: 262 K/UL — SIGNIFICANT CHANGE UP (ref 150–400)
POTASSIUM SERPL-MCNC: 4.3 MMOL/L — SIGNIFICANT CHANGE UP (ref 3.5–5.3)
POTASSIUM SERPL-MCNC: 4.3 MMOL/L — SIGNIFICANT CHANGE UP (ref 3.5–5.3)
POTASSIUM SERPL-SCNC: 4.3 MMOL/L — SIGNIFICANT CHANGE UP (ref 3.5–5.3)
POTASSIUM SERPL-SCNC: 4.3 MMOL/L — SIGNIFICANT CHANGE UP (ref 3.5–5.3)
PROT SERPL-MCNC: 7.7 G/DL — SIGNIFICANT CHANGE UP (ref 6–8.3)
PROT SERPL-MCNC: 7.7 G/DL — SIGNIFICANT CHANGE UP (ref 6–8.3)
RBC # BLD: 5.76 M/UL — SIGNIFICANT CHANGE UP (ref 4.2–5.8)
RBC # BLD: 5.76 M/UL — SIGNIFICANT CHANGE UP (ref 4.2–5.8)
RBC # FLD: 13 % — SIGNIFICANT CHANGE UP (ref 10.3–14.5)
RBC # FLD: 13 % — SIGNIFICANT CHANGE UP (ref 10.3–14.5)
SODIUM SERPL-SCNC: 137 MMOL/L — SIGNIFICANT CHANGE UP (ref 135–145)
SODIUM SERPL-SCNC: 137 MMOL/L — SIGNIFICANT CHANGE UP (ref 135–145)
WBC # BLD: 12.91 K/UL — HIGH (ref 3.8–10.5)
WBC # BLD: 12.91 K/UL — HIGH (ref 3.8–10.5)
WBC # FLD AUTO: 12.91 K/UL — HIGH (ref 3.8–10.5)
WBC # FLD AUTO: 12.91 K/UL — HIGH (ref 3.8–10.5)

## 2024-01-03 PROCEDURE — 99285 EMERGENCY DEPT VISIT HI MDM: CPT

## 2024-01-03 PROCEDURE — 99223 1ST HOSP IP/OBS HIGH 75: CPT

## 2024-01-03 RX ORDER — ONDANSETRON 8 MG/1
4 TABLET, FILM COATED ORAL ONCE
Refills: 0 | Status: COMPLETED | OUTPATIENT
Start: 2024-01-03 | End: 2024-01-03

## 2024-01-03 RX ORDER — ACETAMINOPHEN 500 MG
1000 TABLET ORAL ONCE
Refills: 0 | Status: COMPLETED | OUTPATIENT
Start: 2024-01-03 | End: 2024-01-03

## 2024-01-03 RX ORDER — HYDROMORPHONE HYDROCHLORIDE 2 MG/ML
2 INJECTION INTRAMUSCULAR; INTRAVENOUS; SUBCUTANEOUS ONCE
Refills: 0 | Status: DISCONTINUED | OUTPATIENT
Start: 2024-01-03 | End: 2024-01-03

## 2024-01-03 RX ORDER — MORPHINE SULFATE 50 MG/1
4 CAPSULE, EXTENDED RELEASE ORAL ONCE
Refills: 0 | Status: DISCONTINUED | OUTPATIENT
Start: 2024-01-03 | End: 2024-01-03

## 2024-01-03 RX ORDER — SODIUM CHLORIDE 9 MG/ML
1000 INJECTION INTRAMUSCULAR; INTRAVENOUS; SUBCUTANEOUS
Refills: 0 | Status: DISCONTINUED | OUTPATIENT
Start: 2024-01-03 | End: 2024-01-04

## 2024-01-03 RX ORDER — HYDROMORPHONE HYDROCHLORIDE 2 MG/ML
1 INJECTION INTRAMUSCULAR; INTRAVENOUS; SUBCUTANEOUS ONCE
Refills: 0 | Status: DISCONTINUED | OUTPATIENT
Start: 2024-01-03 | End: 2024-01-03

## 2024-01-03 RX ORDER — SODIUM CHLORIDE 9 MG/ML
1000 INJECTION INTRAMUSCULAR; INTRAVENOUS; SUBCUTANEOUS ONCE
Refills: 0 | Status: DISCONTINUED | OUTPATIENT
Start: 2024-01-03 | End: 2024-01-03

## 2024-01-03 RX ORDER — SODIUM CHLORIDE 9 MG/ML
2000 INJECTION INTRAMUSCULAR; INTRAVENOUS; SUBCUTANEOUS ONCE
Refills: 0 | Status: COMPLETED | OUTPATIENT
Start: 2024-01-03 | End: 2024-01-03

## 2024-01-03 RX ORDER — MORPHINE SULFATE 50 MG/1
2 CAPSULE, EXTENDED RELEASE ORAL ONCE
Refills: 0 | Status: DISCONTINUED | OUTPATIENT
Start: 2024-01-03 | End: 2024-01-03

## 2024-01-03 RX ADMIN — Medication 400 MILLIGRAM(S): at 23:14

## 2024-01-03 RX ADMIN — MORPHINE SULFATE 2 MILLIGRAM(S): 50 CAPSULE, EXTENDED RELEASE ORAL at 22:28

## 2024-01-03 RX ADMIN — Medication 1000 MILLIGRAM(S): at 23:45

## 2024-01-03 RX ADMIN — HYDROMORPHONE HYDROCHLORIDE 1 MILLIGRAM(S): 2 INJECTION INTRAMUSCULAR; INTRAVENOUS; SUBCUTANEOUS at 18:43

## 2024-01-03 RX ADMIN — MORPHINE SULFATE 2 MILLIGRAM(S): 50 CAPSULE, EXTENDED RELEASE ORAL at 22:53

## 2024-01-03 RX ADMIN — HYDROMORPHONE HYDROCHLORIDE 2 MILLIGRAM(S): 2 INJECTION INTRAMUSCULAR; INTRAVENOUS; SUBCUTANEOUS at 20:04

## 2024-01-03 RX ADMIN — SODIUM CHLORIDE 2000 MILLILITER(S): 9 INJECTION INTRAMUSCULAR; INTRAVENOUS; SUBCUTANEOUS at 17:28

## 2024-01-03 RX ADMIN — SODIUM CHLORIDE 100 MILLILITER(S): 9 INJECTION INTRAMUSCULAR; INTRAVENOUS; SUBCUTANEOUS at 23:02

## 2024-01-03 RX ADMIN — ONDANSETRON 4 MILLIGRAM(S): 8 TABLET, FILM COATED ORAL at 22:29

## 2024-01-03 RX ADMIN — MORPHINE SULFATE 4 MILLIGRAM(S): 50 CAPSULE, EXTENDED RELEASE ORAL at 17:40

## 2024-01-03 RX ADMIN — HYDROMORPHONE HYDROCHLORIDE 1 MILLIGRAM(S): 2 INJECTION INTRAMUSCULAR; INTRAVENOUS; SUBCUTANEOUS at 19:13

## 2024-01-03 RX ADMIN — ONDANSETRON 4 MILLIGRAM(S): 8 TABLET, FILM COATED ORAL at 17:28

## 2024-01-03 NOTE — ED PROVIDER NOTE - PHYSICAL EXAMINATION
Physical Exam:  Gen: mild distress, AOx3, non-toxic appearing, able to ambulate without assistance  Head: NCAT  HEENT: EOMI, PEERLA, normal conjunctiva, tongue midline, oral mucosa moist  Lung: CTAB, no respiratory distress, no wheezes/rhonchi/rales B/L, speaking in full sentences  CV: RRR, no murmurs, rubs or gallops  Abd: epigastric tenderness, no guarding or rebound, abdomen soft, nondistended, no CVA tenderness    MSK: no visible deformities, ROM normal in UE/LE, no back pain  Neuro: No focal sensory or motor deficits  Skin: Warm, well perfused, no rash, no leg swelling  Psych: normal affect, calm

## 2024-01-03 NOTE — H&P ADULT - HISTORY OF PRESENT ILLNESS
47 yo man with history of recurrent pancreatitis, asthma/COPD (not on home O2), rheumatoid arthritis, anxiety, and alcohol use disorder (sober for last 20 years) presents with  49 yo man with history of recurrent pancreatitis, asthma/COPD (not on home O2), rheumatoid arthritis, anxiety, and alcohol use disorder (sober for last 20 years) presents with  47 yo man with history of recurrent pancreatitis, ?asthma/COPD (not on home O2), rheumatoid arthritis (not on any meds), anxiety, and alcohol use disorder (sober for last ~20 years) presents with worsening epigastric pain that started shortly after pt was discharged from [facility] on 12/19/23. Pt was hospitalized at [place] from 12/18-12/19/23 for upper abdominal pain found to be 2/2 mild acute interstitial pancreatitis with secondary inflammation of the duodenum. Pt received IVF and pain control with IV morphine while there and had symptomatic improvement. However, after pt returned home on 12/19/23, pt was able to enjoy only 2 meals before pt started having epigastric pain. Pt describes the pain as a "squeezing his gut into a ball" sensation, with the pain radiating to the center of his back, then laterally to both sides of his back. The pain is associated with nausea, though no vomiting, and poor PO intake. Pt has been eating mainly cereal and apple sauce due to the pain. Pt notices no improvement with Tylenol, with the pain now intensifying to a 10/10. Pt states that he feels short of breath at times because he is unable to take deep breaths when the pain becomes excruciating. Pt began having chills on Wednesday. No fevers. Denies any chest pain, palpitations, diarrhea, constipation, melena, BRBPR, or urinary symptoms.     Of note, pt has had multiple episodes of acute pancreatitis, dating back to 2011. One of the episodes was due to gallstones for which pt had a cholecystectomy. However, pt's more recent bouts of pancreatitis were due to unknown triggers. Pt denies any current alcohol use, reports he has been sober for last ~20 years. However, an H&P in EMR from 2013 documented that pt quit drinking alcohol 1 year ago (in 2012).    In the ED,  T 98.1-98.2, HR 65-86, -175/76-91, RR 16-20, SpO2 100% RA.  Lipase 1282.   47 yo man with history of recurrent pancreatitis, ?asthma/COPD (not on home O2), rheumatoid arthritis (not on any meds), anxiety, and alcohol use disorder (sober for last ~20 years) presents with worsening epigastric pain that started shortly after pt was discharged from [facility] on 12/19/23. Pt was hospitalized at [place] from 12/18-12/19/23 for upper abdominal pain found to be 2/2 mild acute interstitial pancreatitis with secondary inflammation of the duodenum. Pt received IVF and pain control with IV morphine while there and had symptomatic improvement. However, after pt returned home on 12/19/23, pt was able to enjoy only 2 meals before pt started having epigastric pain. Pt describes the pain as a "squeezing his gut into a ball" sensation, with the pain radiating to the center of his back, then laterally to both sides of his back. The pain is associated with nausea, though no vomiting, and poor PO intake. Pt has been eating mainly cereal and apple sauce due to the pain. Pt notices no improvement with Tylenol, with the pain now intensifying to a 10/10. Pt states that he feels short of breath at times because he is unable to take deep breaths when the pain becomes excruciating. Pt began having chills on Wednesday. No fevers. Denies any chest pain, palpitations, diarrhea, constipation, melena, BRBPR, or urinary symptoms.     Of note, pt has had multiple episodes of acute pancreatitis, dating back to 2011. One of the episodes was due to gallstones for which pt had a cholecystectomy. However, pt's more recent bouts of pancreatitis were due to unknown triggers. Pt denies any current alcohol use, reports he has been sober for last ~20 years. However, an H&P in EMR from 2013 documented that pt quit drinking alcohol 1 year ago (in 2012).    In the ED,  T 98.1-98.2, HR 65-86, -175/76-91, RR 16-20, SpO2 100% RA.  Lipase 1282.   49 yo man with history of recurrent pancreatitis, ?asthma/COPD (not on home O2), rheumatoid arthritis (not on any meds), type 2 DM (not on insulin), anxiety, and alcohol use disorder (sober for last ~20 years) presents with worsening epigastric pain that started shortly after pt was discharged from [facility] on 12/19/23. Pt was hospitalized at [facility] from 12/18-12/19/23 for upper abdominal pain found to be 2/2 mild acute interstitial pancreatitis with secondary inflammation of the duodenum. Pt received IVF and pain control with IV morphine while there and had symptomatic improvement. However, after pt returned home on 12/19/23, pt was able to enjoy only 2 meals before pt started having epigastric pain. Pt describes the pain as a "squeezing his gut into a ball" sensation, with the pain radiating to the center of his back, then laterally to both sides of his back. The pain is associated with nausea, though no vomiting, and poor PO intake. Pt has been eating mainly cereal and apple sauce due to the pain. Pt notices no improvement with Tylenol, with the pain now intensifying to a 10/10. Pt states that he feels short of breath at times because he is unable to take deep breaths when the pain becomes excruciating. Pt began having chills on Wednesday. No fevers. Denies any chest pain, palpitations, diarrhea, constipation, melena, BRBPR, or urinary symptoms.     Of note, pt has had multiple episodes of acute pancreatitis, dating back to 2011. One of the episodes was due to gallstones for which pt had a cholecystectomy. However, pt's more recent bouts of pancreatitis were due to unknown triggers. Pt denies any current alcohol use, reports he has been sober for last ~20 years. However, an H&P in EMR from 2013 documented that pt quit drinking alcohol 1 year ago (in 2012).    In the ED,  T 98.1-98.2, HR 65-86, -175/76-91, RR 16-20, SpO2 100% RA.  Lipase 1282.   47 yo man with history of recurrent pancreatitis, ?asthma/COPD (not on home O2), rheumatoid arthritis (not on any meds), type 2 DM (not on insulin), anxiety, and alcohol use disorder (sober for last ~20 years) presents with worsening epigastric pain that started shortly after pt was discharged from [facility] on 12/19/23. Pt was hospitalized at [facility] from 12/18-12/19/23 for upper abdominal pain found to be 2/2 mild acute interstitial pancreatitis with secondary inflammation of the duodenum. Pt received IVF and pain control with IV morphine while there and had symptomatic improvement. However, after pt returned home on 12/19/23, pt was able to enjoy only 2 meals before pt started having epigastric pain. Pt describes the pain as a "squeezing his gut into a ball" sensation, with the pain radiating to the center of his back, then laterally to both sides of his back. The pain is associated with nausea, though no vomiting, and poor PO intake. Pt has been eating mainly cereal and apple sauce due to the pain. Pt notices no improvement with Tylenol, with the pain now intensifying to a 10/10. Pt states that he feels short of breath at times because he is unable to take deep breaths when the pain becomes excruciating. Pt began having chills on Wednesday. No fevers. Denies any chest pain, palpitations, diarrhea, constipation, melena, BRBPR, or urinary symptoms.     Of note, pt has had multiple episodes of acute pancreatitis, dating back to 2011. One of the episodes was due to gallstones for which pt had a cholecystectomy. However, pt's more recent bouts of pancreatitis were due to unknown triggers. Pt denies any current alcohol use, reports he has been sober for last ~20 years. However, an H&P in EMR from 2013 documented that pt quit drinking alcohol 1 year ago (in 2012).    In the ED,  T 98.1-98.2, HR 65-86, -175/76-91, RR 16-20, SpO2 100% RA.  Lipase 1282.

## 2024-01-03 NOTE — H&P ADULT - PROBLEM SELECTOR PLAN 5
Pt not on any meds at this time. Stable, no S/S of flare.  - Pain control with Tylenol PRN - C/w Xanax 1 mg qHS PRN for anxiety/sleep  - ISTOP in chart

## 2024-01-03 NOTE — ED PROVIDER NOTE - PROGRESS NOTE DETAILS
DD ED ATTG: pt with clinical pancreatitis.  Lipase 1200+.  Pt still having pain.  rx'ed morphine -> dilaudid 1 -> dilaudid 2mg.  Pt advised he should be admitted.  D/w Hospitalist Reji

## 2024-01-03 NOTE — H&P ADULT - PROBLEM SELECTOR PLAN 4
- C/w Xanax 1 mg qHS PRN for anxiety/sleep  - ISTOP in chart Pt on Metformin  mg daily at home.  - Hold Metformin while inpatient  - Start low-dose ISS and FS checks q6hrs while NPO  - Check A1c

## 2024-01-03 NOTE — ED ADULT NURSE REASSESSMENT NOTE - NS ED NURSE REASSESS COMMENT FT1
Pt states hi ernesto is better, 3/10 and dull. NAD. pt denies SOB, chest pain, dizziness, weakness, urinary symptoms, HA, n/v/d, fevers, chills. respirations are even and un labored. safety precautions maintained. PT given call bell.

## 2024-01-03 NOTE — ED ADULT NURSE NOTE - OBJECTIVE STATEMENT
48 year old male, received to intake 10A. A&Ox4, ambulatory. Respirations equal and unlabored. Past medical history of pancreatitis, DM, anxiety, asthma. Pt c/o epigastric pain, back pain and nausea x3 days. Pt endorsing nausea. Pt denies alcohol or illicit drug use. Pt states used to be daily drinker 20 years ago. Pt recently discharged from [place] for 10 day workout for pancreatitis. Pt denies urinary symptoms, fevers, chills, any other complaints. Pt denies chest pain, SOB, palpitations, dizziness, blurry vision, headache, numbness, tingling, any other complaints. 20G IV placed to R antecubital space. Labs obtained. Medicated as per EMR orders. No acute distress noted. Safety maintained. 48 year old male, received to intake 10A. A&Ox4, ambulatory. Respirations equal and unlabored. Past medical history of pancreatitis, DM, anxiety, asthma. Pt c/o epigastric pain, back pain and nausea x3 days. Pt endorsing nausea. Pt denies alcohol or illicit drug use. Pt states used to be daily drinker 20 years ago. Pt recently discharged from [place] for 10 day workout for pancreatitis. Pt denies urinary symptoms, fevers, chills, any other complaints. Pt denies chest pain, SOB, palpitations, dizziness, blurry vision, headache, numbness, tingling, any other complaints. 20G IV placed to R antecubital space. Labs obtained. Medicated as per EMR orders. No acute distress noted. Safety maintained.

## 2024-01-03 NOTE — ED ADULT TRIAGE NOTE - CHIEF COMPLAINT QUOTE
Pt with epigastric pain states was discharged 12/20 from [facility] treated for pancreatitis. pt having same symptoms. pt cant eat. Pt with DM fs 125 Pt with epigastric pain states was discharged 12/20 from [place] treated for pancreatitis. pt having same symptoms. pt cant eat. Pt with DM fs 125 None

## 2024-01-03 NOTE — H&P ADULT - MLM HIDDEN
**OUR OFFICE WILL CONTACT YOU WITH YOUR RESULTS WHEN THEY ARE AVAILABLE.  WE WILL CONTACT YOU EVEN IF YOUR RESULTS ARE NORMAL*      [facility]  [phone]   Mon-Friday 7 am- 5 pm      [facility]  Mon - Thurs 7 a.m. to 6 p.m  Friday 7 a.m to 5 p.m  Saturday 7 a.m to 12 p.m       ON THE CORONAVIRUS:     This virus is transmitted in one of two ways: by droplet and by contact      When a person with coronavirus infection coughs or sneezes, droplets released into the air can do two things that spread the infection:     The droplets can land in the mouths or noses of people who are up to 6 feet away. A person can get infected this way.      The droplets can land on surfaces. The virus can live for a few hours on surfaces and a person can get infected by touching a surface or object that has the virus on it and then touching their own mouth, nose, or possibly their eyes        What steps can you take to protect yourself?     Social distancing - consider postponing/avoiding large gatherings, parties or social events. This is a simple, effective method critical to curbing the spread of this pandemic. It has worked for a hundred years; it will work now!     WASH YOUR HANDS OFTEN!!!  with soap and water for at least 20 seconds after you have been in a public place, or after blowing your nose, coughing, or sneezing.     If no soap/water, use an alcohol-based hand . Cover all surfaces of your hands and rub together until dry.     DON’T touch your eyes, nose, and mouth with unwashed hands. Try not to touch your face at all when outside your home.      Avoid close contact with people who are sick     Cover your mouth and nose with a tissue when you cough/sneeze or use the inside of your elbow.   Throw used tissues in the trash. And WASH YOUR HANDS immediately after!         If you are sick:?      STAY HOME!! (unless you need to seek medical attention)     You should wear a facemask when you are  around other people (e.g., sharing a room or vehicle) and before you enter a healthcare provider’s office.           Final words:     Let’s prepare, let’s be patient, let’s not panic!   Look out for one another, with compassion and generosity of spirit.    Check in on your elderly, vulnerable friends or relatives.   Don’t sherrie hand sanitizers! Remember - it’s also in your best interest to have people in your community with clean, sanitized hands! ??   We are all in this together, and remember - this too shall pass!   AND WASH YOUR HANDS!!!!       yes

## 2024-01-03 NOTE — H&P ADULT - NSHPSOCIALHISTORY_GEN_ALL_CORE
Former tobacco smoker, smoked 2 ppd x 25 years. Now vapes, with last vape use on Tuesday.   Former alcohol drinker, has been sober for ~20 years.  Denies any recreational/illicit drug use.  . Lives with wife.

## 2024-01-03 NOTE — ED ADULT NURSE NOTE - CHIEF COMPLAINT QUOTE
Pt with epigastric pain states was discharged 12/20 from [place] treated for pancreatitis. pt having same symptoms. pt cant eat. Pt with DM fs 125 Pt with epigastric pain states was discharged 12/20 from [place] treated for pancreatitis. pt having same symptoms. pt cant eat. Pt with DM fs 125

## 2024-01-03 NOTE — H&P ADULT - ASSESSMENT
49 yo man with history of recurrent pancreatitis, ?asthma/COPD (not on home O2), rheumatoid arthritis (not on any meds), anxiety, and alcohol use disorder (sober for last ~20 years) presents with worsening epigastric pain that started shortly after pt was discharged from [facility] on 12/19/23, admitted with likely acute pancreatitis. 47 yo man with history of recurrent pancreatitis, ?asthma/COPD (not on home O2), rheumatoid arthritis (not on any meds), anxiety, and alcohol use disorder (sober for last ~20 years) presents with worsening epigastric pain that started shortly after pt was discharged from [facility] on 12/19/23, admitted with likely acute pancreatitis. 49 yo man with history of recurrent pancreatitis, ?asthma/COPD (not on home O2), rheumatoid arthritis (not on any meds),  type 2 DM (not on insulin), anxiety, and alcohol use disorder (sober for last ~20 years) presents with worsening epigastric pain that started shortly after pt was discharged from [facility] on 12/19/23, admitted with likely acute pancreatitis. 49 yo man with history of recurrent pancreatitis, ?asthma/COPD (not on home O2), rheumatoid arthritis (not on any meds),  type 2 DM (not on insulin), anxiety, and alcohol use disorder (sober for last ~20 years) presents with worsening epigastric pain that started shortly after pt was discharged from [facility] on 12/19/23, admitted with likely acute pancreatitis.

## 2024-01-03 NOTE — H&P ADULT - NSHPLABSRESULTS_GEN_ALL_CORE
EKG personally reviewed.    Labs personally reviewed.                        16.0   12.91 )-----------( 262      ( 03 Jan 2024 17:27 )             47.9     01-03    137  |  99  |  8   ----------------------------<  117<H>  4.3   |  24  |  0.89    Ca    9.3      03 Jan 2024 17:27    TPro  7.7  /  Alb  4.5  /  TBili  0.6  /  DBili  x   /  AST  21  /  ALT  33  /  AlkPhos  70  01-03    Imaging personally reviewed.  ACC: [id number] EXAM:  CT ABDOMEN AND PELVIS IC   ORDERED BY: JANET LEIGH   PROCEDURE DATE:  12/18/2023    FINDINGS:  LOWER CHEST: Bibasilar subsegmental atelectasis.    LIVER: Suggestion of steatosis.  BILE DUCTS: Normal caliber.  GALLBLADDER: Cholecystectomy.  SPLEEN: Within normal limits.  PANCREAS: Mild peripancreatic inflammatory change. No pancreatic   necrosis. No adjacent walled off fluid collection.  ADRENALS: Within normal limits.  KIDNEYS/URETERS: Within normal limits.    BLADDER: Within normal limits.  REPRODUCTIVE ORGANS: Prostate within normal limits.    BOWEL: Mild inflammatory change adjacent to the second and third portions   of the duodenum. No bowel obstruction. Scattered colonic diverticulosis   without diverticulitis. Appendix is normal.  PERITONEUM: No ascites.  VESSELS: Patent splenic vein.  RETROPERITONEUM/LYMPH NODES: No lymphadenopathy.  ABDOMINAL WALL: Small fat-containing right supraumbilical hernia.  BONES: Mild degenerative changes of the spine. Right femoral head bone   island.    IMPRESSION:  Mild acute interstitial pancreatitis with secondary inflammation of the   duodenum. EKG personally reviewed.    Labs personally reviewed.                        16.0   12.91 )-----------( 262      ( 03 Jan 2024 17:27 )             47.9     01-03    137  |  99  |  8   ----------------------------<  117<H>  4.3   |  24  |  0.89    Ca    9.3      03 Jan 2024 17:27    TPro  7.7  /  Alb  4.5  /  TBili  0.6  /  DBili  x   /  AST  21  /  ALT  33  /  AlkPhos  70  01-03    Imaging personally reviewed.  ACC: [id number] EXAM:  CT ABDOMEN AND PELVIS IC   ORDERED BY: JANET LEIGH   PROCEDURE DATE:  12/18/2023    FINDINGS:  LOWER CHEST: Bibasilar subsegmental atelectasis.    LIVER: Suggestion of steatosis.  BILE DUCTS: Normal caliber.  GALLBLADDER: Cholecystectomy.  SPLEEN: Within normal limits.  PANCREAS: Mild peripancreatic inflammatory change. No pancreatic   necrosis. No adjacent walled off fluid collection.  ADRENALS: Within normal limits.  KIDNEYS/URETERS: Within normal limits.    BLADDER: Within normal limits.  REPRODUCTIVE ORGANS: Prostate within normal limits.    BOWEL: Mild inflammatory change adjacent to the second and third portions   of the duodenum. No bowel obstruction. Scattered colonic diverticulosis   without diverticulitis. Appendix is normal.  PERITONEUM: No ascites.  VESSELS: Patent splenic vein.  RETROPERITONEUM/LYMPH NODES: No lymphadenopathy.  ABDOMINAL WALL: Small fat-containing right supraumbilical hernia.  BONES: Mild degenerative changes of the spine. Right femoral head bone   island.    IMPRESSION:  Mild acute interstitial pancreatitis with secondary inflammation of the   duodenum. EKG personally reviewed.  Marked sinus dorian at 47 bpm, no acute ischemic changes, QTc 391 ms.    Labs personally reviewed.                        16.0   12.91 )-----------( 262      ( 03 Jan 2024 17:27 )             47.9     01-03    137  |  99  |  8   ----------------------------<  117<H>  4.3   |  24  |  0.89    Ca    9.3      03 Jan 2024 17:27    TPro  7.7  /  Alb  4.5  /  TBili  0.6  /  DBili  x   /  AST  21  /  ALT  33  /  AlkPhos  70  01-03    Imaging personally reviewed.  ACC: [id number] EXAM:  CT ABDOMEN AND PELVIS IC   ORDERED BY: JANET LEIGH   PROCEDURE DATE:  12/18/2023    FINDINGS:  LOWER CHEST: Bibasilar subsegmental atelectasis.    LIVER: Suggestion of steatosis.  BILE DUCTS: Normal caliber.  GALLBLADDER: Cholecystectomy.  SPLEEN: Within normal limits.  PANCREAS: Mild peripancreatic inflammatory change. No pancreatic   necrosis. No adjacent walled off fluid collection.  ADRENALS: Within normal limits.  KIDNEYS/URETERS: Within normal limits.    BLADDER: Within normal limits.  REPRODUCTIVE ORGANS: Prostate within normal limits.    BOWEL: Mild inflammatory change adjacent to the second and third portions   of the duodenum. No bowel obstruction. Scattered colonic diverticulosis   without diverticulitis. Appendix is normal.  PERITONEUM: No ascites.  VESSELS: Patent splenic vein.  RETROPERITONEUM/LYMPH NODES: No lymphadenopathy.  ABDOMINAL WALL: Small fat-containing right supraumbilical hernia.  BONES: Mild degenerative changes of the spine. Right femoral head bone   island.    IMPRESSION:  Mild acute interstitial pancreatitis with secondary inflammation of the duodenum. EKG personally reviewed.  Marked sinus dorian at 47 bpm, no acute ischemic changes, QTc 391 ms.    Labs personally reviewed.                        16.0   12.91 )-----------( 262      ( 03 Jan 2024 17:27 )             47.9     01-03    137  |  99  |  8   ----------------------------<  117<H>  4.3   |  24  |  0.89    Ca    9.3      03 Jan 2024 17:27    TPro  7.7  /  Alb  4.5  /  TBili  0.6  /  DBili  x   /  AST  21  /  ALT  33  /  AlkPhos  70  01-03    Imaging personally reviewed.  ACC: [id number] EXAM:  CT ABDOMEN AND PELVIS IC   ORDERED BY: JANET LEIGH   PROCEDURE DATE:  12/18/2023    FINDINGS:  LOWER CHEST: Bibasilar subsegmental atelectasis.    LIVER: Suggestion of steatosis.  BILE DUCTS: Normal caliber.  GALLBLADDER: Cholecystectomy.  SPLEEN: Within normal limits.  PANCREAS: Mild peripancreatic inflammatory change. No pancreatic   necrosis. No adjacent walled off fluid collection.  ADRENALS: Within normal limits.  KIDNEYS/URETERS: Within normal limits.    BLADDER: Within normal limits.  REPRODUCTIVE ORGANS: Prostate within normal limits.    BOWEL: Mild inflammatory change adjacent to the second and third portions   of the duodenum. No bowel obstruction. Scattered colonic diverticulosis   without diverticulitis. Appendix is normal.  PERITONEUM: No ascites.  VESSELS: Patent splenic vein.  RETROPERITONEUM/LYMPH NODES: No lymphadenopathy.  ABDOMINAL WALL: Small fat-containing right supraumbilical hernia.  BONES: Mild degenerative changes of the spine. Right femoral head bone   island.    IMPRESSION:  Mild acute interstitial pancreatitis with secondary inflammation of the duodenum.

## 2024-01-03 NOTE — ED PROVIDER NOTE - ATTENDING CONTRIBUTION TO CARE
48M c/o abd pain.  Recently admitted last month at [facility] and treated for pancreatitis; unable to eat.   VS HTN.  From previous admission - h/o chronic pancreatitis, anxiety, DM.  Previously was attributable to alcohol but denies alcohol use last adm.  Pt had lipase > 700 and CT showing pancreatitis last month.  Trig was 102.  S/p cholecystectomy. Mod distress abd pain despite multiple rounds of IV narcotics.  Admit to medicine.  Mildly tender abd, clinically has pancreatitis afebrile, no other organ dysfunction.  No indication for emergent imaging at this time.  VS:  unremarkable    GEN - mod distress abd pain, malaise;   A+O x3   HEAD - NC/AT     ENT - PEERL, EOMI, mucous membranes   dry , no discharge      NECK: Neck supple, non-tender without lymphadenopathy, no masses, no JVD  PULM - CTA b/l,  symmetric breath sounds  COR -  normal heart sounds    ABD - , ND, mild upper abd ttp, soft,  BACK - no CVA tenderness, nontender spine     EXTREMS - no edema, no deformity, warm and well perfused    SKIN - no rash    or bruising      NEUROLOGIC - alert, face symmetric, speech fluent, sensation nl, motor no focal deficit. 48M c/o abd pain.  Recently admitted last month at [facility] and treated for pancreatitis; unable to eat.   VS HTN.  From previous admission - h/o chronic pancreatitis, anxiety, DM.  Previously was attributable to alcohol but denies alcohol use last adm.  Pt had lipase > 700 and CT showing pancreatitis last month.  Trig was 102.  S/p cholecystectomy. Mod distress abd pain despite multiple rounds of IV narcotics.  Admit to medicine.  Mildly tender abd, clinically has pancreatitis afebrile, no other organ dysfunction.  No indication for emergent imaging at this time.  VS:  unremarkable    GEN - mod distress abd pain, malaise;   A+O x3   HEAD - NC/AT     ENT - PEERL, EOMI, mucous membranes   dry , no discharge      NECK: Neck supple, non-tender without lymphadenopathy, no masses, no JVD  PULM - CTA b/l,  symmetric breath sounds  COR -  normal heart sounds    ABD - , ND, mild upper abd ttp, soft,  BACK - no CVA tenderness, nontender spine     EXTREMS - no edema, no deformity, warm and well perfused    SKIN - no rash    or bruising      NEUROLOGIC - alert, face symmetric, speech fluent, sensation nl, motor no focal deficit.

## 2024-01-03 NOTE — ED PROVIDER NOTE - CLINICAL SUMMARY MEDICAL DECISION MAKING FREE TEXT BOX
48-year-old male with past medical history diabetes anxiety presents with 7 days of abdominal pain. Patient with recent admission to [facility] for pancreatitis. Unable to tolerate PO at home 2/2 pain. Vitals WNL on arrival. Patient with epigastric abdominal tenderness. Differential concerning for acute pancreatitis, GERD. Low concern for appendicitis. SBO, hepatitis. Will order labs, IVF and pain control. Patient will likely have to be admitted for pancreatitis. 48-year-old male with past medical history diabetes anxiety presents with 7 days of abdominal pain. Patient with recent admission to [facility] for pancreatitis. Unable to tolerate PO at home 2/2 pain. Vitals WNL on arrival. Patient with epigastric abdominal tenderness. Differential concerning for acute pancreatitis, GERD. Low concern for appendicitis. SBO, hepatitis. Will order labs, IVF and pain control. Patient will likely have to be admitted for pancreatitis.

## 2024-01-03 NOTE — H&P ADULT - NSHPREVIEWOFSYSTEMS_GEN_ALL_CORE
Constitutional: No generalized weakness, fevers, chills, or weight loss  Eyes: No visual changes, double vision, or eye pain  Ears, Nose, Mouth, Throat: No runny nose, sinus pain, ear pain, tinnitus, sore throat, dysphagia, or odynophagia  Cardiovascular: No chest pain, palpitations, or LE edema  Respiratory: No cough, wheezing, hemoptysis, or shortness of breath  Gastrointestinal: No abdominal pain, dysphagia, anorexia, nausea/vomiting, diarrhea/constipation, hematemesis, melena, or BRBPR  Genitourinary: No dysuria, frequency, urgency, or hematuria  Musculoskeletal: No neck pain or back pain. No joint pain, swelling, or decreased ROM.  Skin: No pruritus, rashes, lesions, or wounds  Neurologic: No syncope, seizures, headache, paresthesias, numbness, or limb weakness  Psychiatric: No depression, anxiety, difficulty concentrating, anhedonia, or lack of energy  Endocrine: No heat/cold intolerance, mood swings, sweats, polydipsia, or polyuria  Hematologic/lymphatic: No purpura, petechia, or prolonged or excessive bleeding after dental extraction / injury  Allergic/Immunologic: No anaphylaxis or allergic response to materials, foods, animals    Positives and pertinent negatives noted and all other systems negative. Constitutional: +Chills. No generalized weakness or fevers.  Eyes: No visual changes, double vision, or eye pain  Ears, Nose, Mouth, Throat: No runny nose, sinus pain, ear pain, tinnitus, sore throat, dysphagia, or odynophagia  Cardiovascular: No chest pain, palpitations, or LE edema  Respiratory: No cough, wheezing, hemoptysis, or shortness of breath  Gastrointestinal: +Epigastric pain and nausea. No vomiting, diarrhea/constipation, hematemesis, melena, or BRBPR.  Genitourinary: No dysuria, frequency, urgency, or hematuria  Musculoskeletal: No neck pain. No joint pain, swelling, or decreased ROM.  Skin: No pruritus, rashes, lesions, or wounds  Neurologic: No syncope, seizures, headache, paresthesias, numbness, or limb weakness  Psychiatric: +H/o anxiety. No depression, difficulty concentrating, anhedonia, or lack of energy.  Endocrine: No heat/cold intolerance, mood swings, sweats, polydipsia, or polyuria  Hematologic/lymphatic: No purpura, petechia, or prolonged or excessive bleeding after dental extraction / injury  Allergic/Immunologic: No anaphylaxis or allergic response to materials, foods, animals    Positives and pertinent negatives noted and all other systems negative. Constitutional: +Chills. No generalized weakness or fevers.  Eyes: No visual changes, double vision, or eye pain  Ears, Nose, Mouth, Throat: No runny nose, sinus pain, ear pain, tinnitus, sore throat, dysphagia, or odynophagia  Cardiovascular: No chest pain, palpitations, or LE edema  Respiratory: No cough, wheezing, hemoptysis, or shortness of breath  Gastrointestinal: +Epigastric pain and nausea. No vomiting, diarrhea/constipation, hematemesis, melena, or BRBPR.  Genitourinary: No dysuria, frequency, urgency, or hematuria  Musculoskeletal: No neck pain. No joint pain, swelling, or decreased ROM.  Skin: No pruritus, rashes, lesions, or wounds  Neurologic: No syncope, seizures, headache, paresthesias, numbness, or limb weakness  Psychiatric: +H/o anxiety. No depression, anhedonia, or lack of energy.  Endocrine: No heat/cold intolerance, mood swings, sweats, polydipsia, or polyuria  Hematologic/lymphatic: No purpura, petechia, or prolonged or excessive bleeding after dental extraction / injury  Allergic/Immunologic: No anaphylaxis or allergic response to materials, foods, animals    Positives and pertinent negatives noted and all other systems negative.

## 2024-01-03 NOTE — H&P ADULT - PROBLEM SELECTOR PLAN 7
Pt formerly a heavy drinker, has been sober for ~20 years. No S/S of withdrawal on exam, though unclear if pt is being entirely truthful about his drinking habits. No tongue fasciculations or tremors on exam.   - Monitor for possible withdrawal S/S off CIWA for now Pt denies any history of COPD or asthma, although documented in pt's EMR that he has COPD and asthma. Pt not on any inhalers at home.  - Monitor respiratory status  - Supplemental O2 PRN

## 2024-01-03 NOTE — H&P ADULT - NSICDXPASTMEDICALHX_GEN_ALL_CORE_FT
PAST MEDICAL HISTORY:  Acute Pancreatitis     Anxiety     Asthma     DM (diabetes mellitus)      PAST MEDICAL HISTORY:  Acute Pancreatitis     Anxiety     COPD with asthma     DM (diabetes mellitus)     History of alcohol use disorder     Rheumatoid arthritis

## 2024-01-03 NOTE — PATIENT PROFILE ADULT - FALL HARM RISK - UNIVERSAL INTERVENTIONS
Bed in lowest position, wheels locked, appropriate side rails in place/Call bell, personal items and telephone in reach/Instruct patient to call for assistance before getting out of bed or chair/Non-slip footwear when patient is out of bed/[place] to call system/Physically safe environment - no spills, clutter or unnecessary equipment/Purposeful Proactive Rounding/Room/bathroom lighting operational, light cord in reach Bed in lowest position, wheels locked, appropriate side rails in place/Call bell, personal items and telephone in reach/Instruct patient to call for assistance before getting out of bed or chair/Non-slip footwear when patient is out of bed/[place] to call system/Physically safe environment - no spills, clutter or unnecessary equipment/Purposeful Proactive Rounding/Room/bathroom lighting operational, light cord in reach

## 2024-01-03 NOTE — ED PROVIDER NOTE - OBJECTIVE STATEMENT
48-year-old male with past medical history diabetes anxiety presents with 7 days of abdominal pain.  Patient states pain has worsened, inability to tolerate p.o., secondary to pain.  Patient endorses nausea without vomiting.  Of note patient was recently admitted from 12/18-12/20 for pancreatitis/hospital and discharged.  He has been taking Tylenol at home without relief of symptoms.  Additionally endorses chills.  Denies fevers, diarrhea, chest pain, shortness of breath.  Patient is former alcoholic, quit 20 years ago.  Endorses significant weight loss in the last week. Surgical hx of cholecystectomy.

## 2024-01-03 NOTE — H&P ADULT - PROBLEM SELECTOR PLAN 3
Pt on Metformin  mg daily at home.  - Hold Metformin while inpatient  - Start low-dose ISS and FS checks q6hrs while NPO  - Check A1c Pt with marked sinus dorian to 47 bpm on admission EKG. Pt HD stable and asymptomatic from bradycardia. Likely near pt's baseline resting HR, as pt noted to have HR in low 60s at [place].   - No AV blocks noted on admission EKG  - Check TSH  - Monitor VS q4hrs Pt with marked sinus dorian to 47 bpm on admission EKG. Pt HD stable and asymptomatic from bradycardia. Likely near pt's baseline resting HR, as pt noted to have HR in low 60s at [place].   - No AV blocks noted on admission EKG  - Check TSH  - Monitor VS q4hrs

## 2024-01-03 NOTE — H&P ADULT - PROBLEM SELECTOR PLAN 8
- DVT ppx: Lovenox SQ 40 mg daily   - Diet: NPO except for meds Pt formerly a heavy drinker, has been sober for ~20 years. No S/S of withdrawal on exam, though unclear if pt is being entirely truthful about his drinking habits. No tongue fasciculations or tremors on exam.   - Monitor for possible withdrawal S/S off CIWA for now

## 2024-01-03 NOTE — H&P ADULT - PROBLEM SELECTOR PLAN 6
Pt denies any history of COPD or asthma, although documented in pt's EMR that he has COPD and asthma. Pt not on any inhalers at home.  - Monitor respiratory status  - Supplemental O2 PRN Pt not on any meds at this time. Stable, no S/S of flare.  - Pain control with Tylenol PRN

## 2024-01-03 NOTE — PATIENT PROFILE ADULT - ARE SIGNIFICANT INDICATORS COMPLETE.
Physical Therapy Visit    Visit Type: Daily Treatment Note  Visit: 2  Referring Provider: Don Miller NP  Medical Diagnosis (from order): Diagnosis Information    Diagnosis  V58.89, 836.2 (ICD-9-CM) - S83.203D (ICD-10-CM) - Complex tear of meniscus of right knee as current injury, unspecified meniscus, subsequent encounter         SUBJECTIVE                                                                                                               Patient reports, \"It's pretty good.\"    Pain / Symptoms  - Pain rating (out of 10): Current: 1       OBJECTIVE                                                                                                                                       Treatment     Therapeutic Exercise  PERFORMED  -Sitting quad set x10, 3s holds  -Supine SLR x10 - no extensor lag  -Supine SLR x20 w 1# ankle weights - no extensor lag BLEs  -Bridging 2x10   -Standing heel raises x20  -HEP reviewed    Therapeutic Activity  PERFORMED  -Lateral ambulation in hallway w YTB around ankles x3 laps   -Monster walks down hallway w YTB around ankles x3 laps   -Standing hip extension, no UE support x20 BLEs  -Forward step-ups on 6\" box x15 RLE lead  -Lateral step-ups on 6\" box x15 RLE lead  -Mini squats w/o countertop support x15    Neuromuscular Re-Education  PERFORMED  -Standing hip abduction on Airex, no UE support x20 BLEs  -Standing marches on Airex x10 ea side     Skilled input: verbal instruction/cues, tactile instruction/cues, posture correction and demonstration    Writer verbally educated and received verbal consent for hand placement, positioning of patient, and techniques to be performed today from patient for clothing adjustments for techniques, therapist position for techniques and hand placement and palpation for techniques as described above and how they are pertinent to the patient's plan of care.  Home Exercise Program  Access Code: [email or web address]  URL:  [email or web address]  Date: 05/23/2023  Prepared by: Negar Garcia    Exercises  - Sidelying Hip Abduction  - 2 x daily - 7 x weekly - 1 sets - 15 reps  - Active Straight Leg Raise with Quad Set  - 2 x daily - 7 x weekly - 1 sets - 15 reps  - Seated Hamstring Stretch  - 2 x daily - 7 x weekly - 1 sets - 10 reps - 10s hold  - Seated Quad Set  - 2 x daily - 7 x weekly - 2 sets - 10 reps - 3s hold  - Supine Heel Slide  - 2 x daily - 7 x weekly - 2 sets - 10 reps  - Supine Bridge  - 1 x daily - 7 x weekly - 2 sets - 10 reps  - Side Stepping with Resistance at Ankles  - 1 x daily - 7 x weekly - 2 sets - 10 reps  - Seated Single Knee to Chest  - 1 x daily - 7 x weekly - 1 sets - 10 reps - 10s hold        ASSESSMENT                                                                                                            Pt reports minimal pain at start of session today w mild noticeable improvement in ability to ambulate in community. Pt reports compliance w HEP. Patient jayden's significant improvement in right quad strength as observed w SLR as patient able to progress to 1# ankle weight w/o extensor lag. Patient jayden's mild improvement in right knee stability today as patient able to tolerate step-ups from forward and lateral directions w/o incr in pain. Therapist provided pt w updated HEP at end of session.     Education:   - Results of above outlined education: Verbalizes understanding and Demonstrates understanding    PLAN                                                                                                                           Suggestions for next session as indicated: Progress per plan of care       Therapy procedure time and total treatment time can be found documented on the Time Entry flowsheet     Yes

## 2024-01-03 NOTE — ED ADULT NURSE NOTE - NSFALLUNIVINTERV_ED_ALL_ED
Bed/Stretcher in lowest position, wheels locked, appropriate side rails in place/Call bell, personal items and telephone in reach/Instruct patient to call for assistance before getting out of bed/chair/stretcher/Non-slip footwear applied when patient is off stretcher/[place] to call system/Physically safe environment - no spills, clutter or unnecessary equipment/Purposeful proactive rounding/Room/bathroom lighting operational, light cord in reach Bed/Stretcher in lowest position, wheels locked, appropriate side rails in place/Call bell, personal items and telephone in reach/Instruct patient to call for assistance before getting out of bed/chair/stretcher/Non-slip footwear applied when patient is off stretcher/[place] to call system/Physically safe environment - no spills, clutter or unnecessary equipment/Purposeful proactive rounding/Room/bathroom lighting operational, light cord in reach

## 2024-01-03 NOTE — ED ADULT NURSE REASSESSMENT NOTE - NS ED NURSE REASSESS COMMENT FT1
A&Ox4. c/o pain. NAD. pt denies SOB, chest pain, dizziness, weakness, urinary symptoms, HA, n/v/d, fevers, chills. respirations are even and un labored, safety precautions maintained.

## 2024-01-03 NOTE — H&P ADULT - NSHPPHYSICALEXAM_GEN_ALL_CORE
Vital Signs Last 24 Hrs  T(C): 36.5 (03 Jan 2024 22:20), Max: 36.8 (03 Jan 2024 15:34)  T(F): 97.7 (03 Jan 2024 22:20), Max: 98.2 (03 Jan 2024 15:34)  HR: 54 (03 Jan 2024 22:20) (54 - 86)  BP: 167/83 (03 Jan 2024 22:20) (167/76 - 175/91)  BP(mean): --  RR: 16 (03 Jan 2024 22:20) (16 - 20)  SpO2: 96% (03 Jan 2024 22:20) (96% - 100%)    PHYSICAL EXAM:  General: Awake and alert.  No acute distress.  Head: Normocephalic, atraumatic.    Eyes: PERRL.  EOMI.  No scleral icterus.  No conjunctival pallor.  Mouth: Moist MM.  No oropharyngeal exudates.    Neck: Supple.  Full range of motion.  No JVD.  No LAD.  No thyromegaly.  Trachea midline.    Heart: RRR.  Normal S1 and S2.  No murmurs, rubs, or gallops.  No LE edema b/l.   Lungs: Nonlabored breathing.  Good inspiratory effort.  CTAB.  No wheezes, crackles, or rhonchi.    Abdomen: BS+, soft, nontender with no rebound or guarding, nondistended.  No hepatomegaly.   Skin: Warm and dry.  No rashes.  Extremities: No cyanosis.  2+ peripheral pulses b/l.  Musculoskeletal: No joint deformities.  No spinal or paraspinal tenderness.  Neuro: A&Ox3.  CN II-XII intact.  5/5 motor strength in UE and LE b/l.  Tactile sensation intact in UE and LE b/l.  Cerebellar function intact as assessed by finger-to-nose test.  No focal deficits. Vital Signs Last 24 Hrs  T(C): 36.5 (03 Jan 2024 22:20), Max: 36.8 (03 Jan 2024 15:34)  T(F): 97.7 (03 Jan 2024 22:20), Max: 98.2 (03 Jan 2024 15:34)  HR: 54 (03 Jan 2024 22:20) (54 - 86)  BP: 167/83 (03 Jan 2024 22:20) (167/76 - 175/91)  BP(mean): --  RR: 16 (03 Jan 2024 22:20) (16 - 20)  SpO2: 96% (03 Jan 2024 22:20) (96% - 100%)    PHYSICAL EXAM:  General: Awake and alert.  No acute distress.  Head: Normocephalic, atraumatic.    Eyes: PERRL.  EOMI.  No scleral icterus.  No conjunctival pallor.  Mouth: No tongue fasciculations.  Moist MM.  No oropharyngeal exudates.    Neck: Supple.  Full range of motion.  No JVD.  No LAD.  No thyromegaly.  Trachea midline.    Heart: RRR.  Normal S1 and S2.  No murmurs, rubs, or gallops.  No LE edema b/l.   Lungs: Nonlabored breathing.  Good inspiratory effort.  CTAB.  No wheezes, crackles, or rhonchi.    Abdomen: BS+, soft, mild TTP in epigastric, LLQ, and RLQ with no rebound or guarding, nondistended.  No hepatomegaly.  Ventral hernia present, easily reducible and with no overlying skin changes.   Skin: Warm and dry.  No rashes.  Extremities: No cyanosis.  2+ peripheral pulses b/l.  Musculoskeletal: No joint deformities.  No spinal or paraspinal tenderness.  Neuro: A&Ox3.  CN II-XII intact.  5/5 motor strength in UE and LE b/l.  Tactile sensation intact in UE and LE b/l.  No focal deficits.  Psychiatric: Normal mood, full affect.

## 2024-01-03 NOTE — H&P ADULT - PROBLEM SELECTOR PLAN 2
WBC 12.91 on admission. Pt did not meet SIRS criteria, however. Leukocytosis likely in setting of acute pancreatitis. Pt afebrile.   - Trend CBC with diff daily  - Monitor off antibiotics for now WBC 12.91 on admission. Pt did not meet SIRS criteria, however. Leukocytosis likely in setting of acute pancreatitis. Pt afebrile.   - Trend CBC with diff daily  - Monitor off antibiotics for now  - F/u CTAP with IV contrast

## 2024-01-03 NOTE — H&P ADULT - PROBLEM SELECTOR PLAN 1
Pt presenting with worsening epigastric pain x2 weeks, associated with nausea and poor PO intake. Lipase elevated to 1282. Most likely acute pancreatitis, though unclear etiology. Pt denies any current alcohol use. TG 76 on admission. IgG4 in Dec 2023 wnl. Possibly from nicotine vaping?  - S/p 2L bolus of NS in ED. C/w IVF with LR at 200 cc/hr x12 hrs for now, reassess thereafter.  - Keep NPO except meds  - CTAP with IV contrast ordered to r/o any complications of pancreatitis, such as acute peripancreatic fluid collection, pancreatic pseudocyst, or necrotizing pancreatitis given pt with 2 recent occurrences of pancreatitis only a few days apart   - Pain control with Tylenol PRN, IV morphine 2 mg q4hrs PRN for moderate pain, and IV morphine 4 mg q4hrs PRN for severe pain  - Serial abdominal exams

## 2024-01-04 DIAGNOSIS — R00.1 BRADYCARDIA, UNSPECIFIED: ICD-10-CM

## 2024-01-04 DIAGNOSIS — F41.9 ANXIETY DISORDER, UNSPECIFIED: ICD-10-CM

## 2024-01-04 DIAGNOSIS — J44.89 OTHER SPECIFIED CHRONIC OBSTRUCTIVE PULMONARY DISEASE: ICD-10-CM

## 2024-01-04 DIAGNOSIS — K85.90 ACUTE PANCREATITIS WITHOUT NECROSIS OR INFECTION, UNSPECIFIED: ICD-10-CM

## 2024-01-04 DIAGNOSIS — M06.9 RHEUMATOID ARTHRITIS, UNSPECIFIED: ICD-10-CM

## 2024-01-04 DIAGNOSIS — E11.9 TYPE 2 DIABETES MELLITUS WITHOUT COMPLICATIONS: ICD-10-CM

## 2024-01-04 DIAGNOSIS — D72.829 ELEVATED WHITE BLOOD CELL COUNT, UNSPECIFIED: ICD-10-CM

## 2024-01-04 DIAGNOSIS — Z87.898 PERSONAL HISTORY OF OTHER SPECIFIED CONDITIONS: ICD-10-CM

## 2024-01-04 DIAGNOSIS — Z29.9 ENCOUNTER FOR PROPHYLACTIC MEASURES, UNSPECIFIED: ICD-10-CM

## 2024-01-04 LAB
A1C WITH ESTIMATED AVERAGE GLUCOSE RESULT: 5.7 % — HIGH (ref 4–5.6)
A1C WITH ESTIMATED AVERAGE GLUCOSE RESULT: 5.7 % — HIGH (ref 4–5.6)
ALBUMIN SERPL ELPH-MCNC: 4.4 G/DL — SIGNIFICANT CHANGE UP (ref 3.3–5)
ALBUMIN SERPL ELPH-MCNC: 4.4 G/DL — SIGNIFICANT CHANGE UP (ref 3.3–5)
ALP SERPL-CCNC: 72 U/L — SIGNIFICANT CHANGE UP (ref 40–120)
ALP SERPL-CCNC: 72 U/L — SIGNIFICANT CHANGE UP (ref 40–120)
ALT FLD-CCNC: 57 U/L — HIGH (ref 4–41)
ALT FLD-CCNC: 57 U/L — HIGH (ref 4–41)
ANION GAP SERPL CALC-SCNC: 11 MMOL/L — SIGNIFICANT CHANGE UP (ref 7–14)
ANION GAP SERPL CALC-SCNC: 11 MMOL/L — SIGNIFICANT CHANGE UP (ref 7–14)
APTT BLD: 31.4 SEC — SIGNIFICANT CHANGE UP (ref 24.5–35.6)
APTT BLD: 31.4 SEC — SIGNIFICANT CHANGE UP (ref 24.5–35.6)
AST SERPL-CCNC: 45 U/L — HIGH (ref 4–40)
AST SERPL-CCNC: 45 U/L — HIGH (ref 4–40)
BASOPHILS # BLD AUTO: 0.06 K/UL — SIGNIFICANT CHANGE UP (ref 0–0.2)
BASOPHILS # BLD AUTO: 0.06 K/UL — SIGNIFICANT CHANGE UP (ref 0–0.2)
BASOPHILS NFR BLD AUTO: 0.6 % — SIGNIFICANT CHANGE UP (ref 0–2)
BASOPHILS NFR BLD AUTO: 0.6 % — SIGNIFICANT CHANGE UP (ref 0–2)
BILIRUB SERPL-MCNC: 0.6 MG/DL — SIGNIFICANT CHANGE UP (ref 0.2–1.2)
BILIRUB SERPL-MCNC: 0.6 MG/DL — SIGNIFICANT CHANGE UP (ref 0.2–1.2)
BUN SERPL-MCNC: 7 MG/DL — SIGNIFICANT CHANGE UP (ref 7–23)
BUN SERPL-MCNC: 7 MG/DL — SIGNIFICANT CHANGE UP (ref 7–23)
CALCIUM SERPL-MCNC: 9.4 MG/DL — SIGNIFICANT CHANGE UP (ref 8.4–10.5)
CALCIUM SERPL-MCNC: 9.4 MG/DL — SIGNIFICANT CHANGE UP (ref 8.4–10.5)
CHLORIDE SERPL-SCNC: 103 MMOL/L — SIGNIFICANT CHANGE UP (ref 98–107)
CHLORIDE SERPL-SCNC: 103 MMOL/L — SIGNIFICANT CHANGE UP (ref 98–107)
CO2 SERPL-SCNC: 25 MMOL/L — SIGNIFICANT CHANGE UP (ref 22–31)
CO2 SERPL-SCNC: 25 MMOL/L — SIGNIFICANT CHANGE UP (ref 22–31)
CREAT SERPL-MCNC: 0.87 MG/DL — SIGNIFICANT CHANGE UP (ref 0.5–1.3)
CREAT SERPL-MCNC: 0.87 MG/DL — SIGNIFICANT CHANGE UP (ref 0.5–1.3)
EGFR: 105 ML/MIN/1.73M2 — SIGNIFICANT CHANGE UP
EGFR: 105 ML/MIN/1.73M2 — SIGNIFICANT CHANGE UP
EOSINOPHIL # BLD AUTO: 0.04 K/UL — SIGNIFICANT CHANGE UP (ref 0–0.5)
EOSINOPHIL # BLD AUTO: 0.04 K/UL — SIGNIFICANT CHANGE UP (ref 0–0.5)
EOSINOPHIL NFR BLD AUTO: 0.4 % — SIGNIFICANT CHANGE UP (ref 0–6)
EOSINOPHIL NFR BLD AUTO: 0.4 % — SIGNIFICANT CHANGE UP (ref 0–6)
ESTIMATED AVERAGE GLUCOSE: 117 — SIGNIFICANT CHANGE UP
ESTIMATED AVERAGE GLUCOSE: 117 — SIGNIFICANT CHANGE UP
GLUCOSE BLDC GLUCOMTR-MCNC: 100 MG/DL — HIGH (ref 70–99)
GLUCOSE BLDC GLUCOMTR-MCNC: 100 MG/DL — HIGH (ref 70–99)
GLUCOSE BLDC GLUCOMTR-MCNC: 89 MG/DL — SIGNIFICANT CHANGE UP (ref 70–99)
GLUCOSE BLDC GLUCOMTR-MCNC: 89 MG/DL — SIGNIFICANT CHANGE UP (ref 70–99)
GLUCOSE BLDC GLUCOMTR-MCNC: 94 MG/DL — SIGNIFICANT CHANGE UP (ref 70–99)
GLUCOSE BLDC GLUCOMTR-MCNC: 94 MG/DL — SIGNIFICANT CHANGE UP (ref 70–99)
GLUCOSE SERPL-MCNC: 93 MG/DL — SIGNIFICANT CHANGE UP (ref 70–99)
GLUCOSE SERPL-MCNC: 93 MG/DL — SIGNIFICANT CHANGE UP (ref 70–99)
HCT VFR BLD CALC: 45.5 % — SIGNIFICANT CHANGE UP (ref 39–50)
HCT VFR BLD CALC: 45.5 % — SIGNIFICANT CHANGE UP (ref 39–50)
HGB BLD-MCNC: 15.1 G/DL — SIGNIFICANT CHANGE UP (ref 13–17)
HGB BLD-MCNC: 15.1 G/DL — SIGNIFICANT CHANGE UP (ref 13–17)
IANC: 6.81 K/UL — SIGNIFICANT CHANGE UP (ref 1.8–7.4)
IANC: 6.81 K/UL — SIGNIFICANT CHANGE UP (ref 1.8–7.4)
IMM GRANULOCYTES NFR BLD AUTO: 0.3 % — SIGNIFICANT CHANGE UP (ref 0–0.9)
IMM GRANULOCYTES NFR BLD AUTO: 0.3 % — SIGNIFICANT CHANGE UP (ref 0–0.9)
INR BLD: 1.05 RATIO — SIGNIFICANT CHANGE UP (ref 0.85–1.18)
INR BLD: 1.05 RATIO — SIGNIFICANT CHANGE UP (ref 0.85–1.18)
LYMPHOCYTES # BLD AUTO: 2.34 K/UL — SIGNIFICANT CHANGE UP (ref 1–3.3)
LYMPHOCYTES # BLD AUTO: 2.34 K/UL — SIGNIFICANT CHANGE UP (ref 1–3.3)
LYMPHOCYTES # BLD AUTO: 23.3 % — SIGNIFICANT CHANGE UP (ref 13–44)
LYMPHOCYTES # BLD AUTO: 23.3 % — SIGNIFICANT CHANGE UP (ref 13–44)
MAGNESIUM SERPL-MCNC: 2.1 MG/DL — SIGNIFICANT CHANGE UP (ref 1.6–2.6)
MAGNESIUM SERPL-MCNC: 2.1 MG/DL — SIGNIFICANT CHANGE UP (ref 1.6–2.6)
MCHC RBC-ENTMCNC: 27.4 PG — SIGNIFICANT CHANGE UP (ref 27–34)
MCHC RBC-ENTMCNC: 27.4 PG — SIGNIFICANT CHANGE UP (ref 27–34)
MCHC RBC-ENTMCNC: 33.2 GM/DL — SIGNIFICANT CHANGE UP (ref 32–36)
MCHC RBC-ENTMCNC: 33.2 GM/DL — SIGNIFICANT CHANGE UP (ref 32–36)
MCV RBC AUTO: 82.4 FL — SIGNIFICANT CHANGE UP (ref 80–100)
MCV RBC AUTO: 82.4 FL — SIGNIFICANT CHANGE UP (ref 80–100)
MONOCYTES # BLD AUTO: 0.78 K/UL — SIGNIFICANT CHANGE UP (ref 0–0.9)
MONOCYTES # BLD AUTO: 0.78 K/UL — SIGNIFICANT CHANGE UP (ref 0–0.9)
MONOCYTES NFR BLD AUTO: 7.8 % — SIGNIFICANT CHANGE UP (ref 2–14)
MONOCYTES NFR BLD AUTO: 7.8 % — SIGNIFICANT CHANGE UP (ref 2–14)
NEUTROPHILS # BLD AUTO: 6.81 K/UL — SIGNIFICANT CHANGE UP (ref 1.8–7.4)
NEUTROPHILS # BLD AUTO: 6.81 K/UL — SIGNIFICANT CHANGE UP (ref 1.8–7.4)
NEUTROPHILS NFR BLD AUTO: 67.6 % — SIGNIFICANT CHANGE UP (ref 43–77)
NEUTROPHILS NFR BLD AUTO: 67.6 % — SIGNIFICANT CHANGE UP (ref 43–77)
NRBC # BLD: 0 /100 WBCS — SIGNIFICANT CHANGE UP (ref 0–0)
NRBC # BLD: 0 /100 WBCS — SIGNIFICANT CHANGE UP (ref 0–0)
NRBC # FLD: 0 K/UL — SIGNIFICANT CHANGE UP (ref 0–0)
NRBC # FLD: 0 K/UL — SIGNIFICANT CHANGE UP (ref 0–0)
PHOSPHATE SERPL-MCNC: 2.8 MG/DL — SIGNIFICANT CHANGE UP (ref 2.5–4.5)
PHOSPHATE SERPL-MCNC: 2.8 MG/DL — SIGNIFICANT CHANGE UP (ref 2.5–4.5)
PLATELET # BLD AUTO: 226 K/UL — SIGNIFICANT CHANGE UP (ref 150–400)
PLATELET # BLD AUTO: 226 K/UL — SIGNIFICANT CHANGE UP (ref 150–400)
POTASSIUM SERPL-MCNC: 4.4 MMOL/L — SIGNIFICANT CHANGE UP (ref 3.5–5.3)
POTASSIUM SERPL-MCNC: 4.4 MMOL/L — SIGNIFICANT CHANGE UP (ref 3.5–5.3)
POTASSIUM SERPL-SCNC: 4.4 MMOL/L — SIGNIFICANT CHANGE UP (ref 3.5–5.3)
POTASSIUM SERPL-SCNC: 4.4 MMOL/L — SIGNIFICANT CHANGE UP (ref 3.5–5.3)
PROT SERPL-MCNC: 7.3 G/DL — SIGNIFICANT CHANGE UP (ref 6–8.3)
PROT SERPL-MCNC: 7.3 G/DL — SIGNIFICANT CHANGE UP (ref 6–8.3)
PROTHROM AB SERPL-ACNC: 11.9 SEC — SIGNIFICANT CHANGE UP (ref 9.5–13)
PROTHROM AB SERPL-ACNC: 11.9 SEC — SIGNIFICANT CHANGE UP (ref 9.5–13)
RBC # BLD: 5.52 M/UL — SIGNIFICANT CHANGE UP (ref 4.2–5.8)
RBC # BLD: 5.52 M/UL — SIGNIFICANT CHANGE UP (ref 4.2–5.8)
RBC # FLD: 13.2 % — SIGNIFICANT CHANGE UP (ref 10.3–14.5)
RBC # FLD: 13.2 % — SIGNIFICANT CHANGE UP (ref 10.3–14.5)
SODIUM SERPL-SCNC: 139 MMOL/L — SIGNIFICANT CHANGE UP (ref 135–145)
SODIUM SERPL-SCNC: 139 MMOL/L — SIGNIFICANT CHANGE UP (ref 135–145)
TRIGL SERPL-MCNC: 76 MG/DL — SIGNIFICANT CHANGE UP
TRIGL SERPL-MCNC: 76 MG/DL — SIGNIFICANT CHANGE UP
TSH SERPL-MCNC: 0.98 UIU/ML — SIGNIFICANT CHANGE UP (ref 0.27–4.2)
TSH SERPL-MCNC: 0.98 UIU/ML — SIGNIFICANT CHANGE UP (ref 0.27–4.2)
WBC # BLD: 10.06 K/UL — SIGNIFICANT CHANGE UP (ref 3.8–10.5)
WBC # BLD: 10.06 K/UL — SIGNIFICANT CHANGE UP (ref 3.8–10.5)
WBC # FLD AUTO: 10.06 K/UL — SIGNIFICANT CHANGE UP (ref 3.8–10.5)
WBC # FLD AUTO: 10.06 K/UL — SIGNIFICANT CHANGE UP (ref 3.8–10.5)

## 2024-01-04 PROCEDURE — 99233 SBSQ HOSP IP/OBS HIGH 50: CPT

## 2024-01-04 PROCEDURE — 99222 1ST HOSP IP/OBS MODERATE 55: CPT | Mod: GC

## 2024-01-04 PROCEDURE — 74177 CT ABD & PELVIS W/CONTRAST: CPT | Mod: 26

## 2024-01-04 PROCEDURE — 93010 ELECTROCARDIOGRAM REPORT: CPT

## 2024-01-04 RX ORDER — MORPHINE SULFATE 50 MG/1
2 CAPSULE, EXTENDED RELEASE ORAL EVERY 4 HOURS
Refills: 0 | Status: DISCONTINUED | OUTPATIENT
Start: 2024-01-04 | End: 2024-01-04

## 2024-01-04 RX ORDER — ALPRAZOLAM 0.25 MG
1 TABLET ORAL
Refills: 0 | DISCHARGE

## 2024-01-04 RX ORDER — HYDROMORPHONE HYDROCHLORIDE 2 MG/ML
0.5 INJECTION INTRAMUSCULAR; INTRAVENOUS; SUBCUTANEOUS EVERY 4 HOURS
Refills: 0 | Status: DISCONTINUED | OUTPATIENT
Start: 2024-01-04 | End: 2024-01-05

## 2024-01-04 RX ORDER — SODIUM CHLORIDE 9 MG/ML
1000 INJECTION, SOLUTION INTRAVENOUS
Refills: 0 | Status: DISCONTINUED | OUTPATIENT
Start: 2024-01-04 | End: 2024-01-12

## 2024-01-04 RX ORDER — MORPHINE SULFATE 50 MG/1
4 CAPSULE, EXTENDED RELEASE ORAL EVERY 4 HOURS
Refills: 0 | Status: DISCONTINUED | OUTPATIENT
Start: 2024-01-04 | End: 2024-01-04

## 2024-01-04 RX ORDER — DEXTROSE 50 % IN WATER 50 %
25 SYRINGE (ML) INTRAVENOUS ONCE
Refills: 0 | Status: DISCONTINUED | OUTPATIENT
Start: 2024-01-04 | End: 2024-01-12

## 2024-01-04 RX ORDER — POLYETHYLENE GLYCOL 3350 17 G/17G
17 POWDER, FOR SOLUTION ORAL DAILY
Refills: 0 | Status: DISCONTINUED | OUTPATIENT
Start: 2024-01-04 | End: 2024-01-05

## 2024-01-04 RX ORDER — SODIUM CHLORIDE 9 MG/ML
1000 INJECTION, SOLUTION INTRAVENOUS
Refills: 0 | Status: DISCONTINUED | OUTPATIENT
Start: 2024-01-04 | End: 2024-01-05

## 2024-01-04 RX ORDER — ACETAMINOPHEN 500 MG
650 TABLET ORAL EVERY 6 HOURS
Refills: 0 | Status: DISCONTINUED | OUTPATIENT
Start: 2024-01-04 | End: 2024-01-07

## 2024-01-04 RX ORDER — SODIUM CHLORIDE 9 MG/ML
1000 INJECTION, SOLUTION INTRAVENOUS
Refills: 0 | Status: DISCONTINUED | OUTPATIENT
Start: 2024-01-04 | End: 2024-01-04

## 2024-01-04 RX ORDER — DEXTROSE 50 % IN WATER 50 %
15 SYRINGE (ML) INTRAVENOUS ONCE
Refills: 0 | Status: DISCONTINUED | OUTPATIENT
Start: 2024-01-04 | End: 2024-01-12

## 2024-01-04 RX ORDER — HYDROMORPHONE HYDROCHLORIDE 2 MG/ML
1 INJECTION INTRAMUSCULAR; INTRAVENOUS; SUBCUTANEOUS EVERY 4 HOURS
Refills: 0 | Status: DISCONTINUED | OUTPATIENT
Start: 2024-01-04 | End: 2024-01-06

## 2024-01-04 RX ORDER — KETOROLAC TROMETHAMINE 30 MG/ML
15 SYRINGE (ML) INJECTION EVERY 6 HOURS
Refills: 0 | Status: DISCONTINUED | OUTPATIENT
Start: 2024-01-04 | End: 2024-01-05

## 2024-01-04 RX ORDER — SENNA PLUS 8.6 MG/1
2 TABLET ORAL AT BEDTIME
Refills: 0 | Status: DISCONTINUED | OUTPATIENT
Start: 2024-01-04 | End: 2024-01-12

## 2024-01-04 RX ORDER — MORPHINE SULFATE 50 MG/1
4 CAPSULE, EXTENDED RELEASE ORAL ONCE
Refills: 0 | Status: DISCONTINUED | OUTPATIENT
Start: 2024-01-04 | End: 2024-01-04

## 2024-01-04 RX ORDER — METFORMIN HYDROCHLORIDE 850 MG/1
1 TABLET ORAL
Refills: 0 | DISCHARGE

## 2024-01-04 RX ORDER — METOCLOPRAMIDE HCL 10 MG
5 TABLET ORAL ONCE
Refills: 0 | Status: COMPLETED | OUTPATIENT
Start: 2024-01-04 | End: 2024-01-04

## 2024-01-04 RX ORDER — ALPRAZOLAM 0.25 MG
1 TABLET ORAL AT BEDTIME
Refills: 0 | Status: DISCONTINUED | OUTPATIENT
Start: 2024-01-04 | End: 2024-01-11

## 2024-01-04 RX ORDER — GLUCAGON INJECTION, SOLUTION 0.5 MG/.1ML
1 INJECTION, SOLUTION SUBCUTANEOUS ONCE
Refills: 0 | Status: DISCONTINUED | OUTPATIENT
Start: 2024-01-04 | End: 2024-01-12

## 2024-01-04 RX ORDER — ACETAMINOPHEN 500 MG
1000 TABLET ORAL ONCE
Refills: 0 | Status: COMPLETED | OUTPATIENT
Start: 2024-01-04 | End: 2024-01-04

## 2024-01-04 RX ORDER — ENOXAPARIN SODIUM 100 MG/ML
40 INJECTION SUBCUTANEOUS EVERY 24 HOURS
Refills: 0 | Status: DISCONTINUED | OUTPATIENT
Start: 2024-01-04 | End: 2024-01-12

## 2024-01-04 RX ORDER — DEXTROSE 50 % IN WATER 50 %
12.5 SYRINGE (ML) INTRAVENOUS ONCE
Refills: 0 | Status: DISCONTINUED | OUTPATIENT
Start: 2024-01-04 | End: 2024-01-12

## 2024-01-04 RX ORDER — ACETAMINOPHEN 500 MG
750 TABLET ORAL ONCE
Refills: 0 | Status: COMPLETED | OUTPATIENT
Start: 2024-01-04 | End: 2024-01-04

## 2024-01-04 RX ORDER — ONDANSETRON 8 MG/1
4 TABLET, FILM COATED ORAL EVERY 8 HOURS
Refills: 0 | Status: DISCONTINUED | OUTPATIENT
Start: 2024-01-04 | End: 2024-01-12

## 2024-01-04 RX ORDER — INSULIN LISPRO 100/ML
VIAL (ML) SUBCUTANEOUS EVERY 6 HOURS
Refills: 0 | Status: DISCONTINUED | OUTPATIENT
Start: 2024-01-04 | End: 2024-01-12

## 2024-01-04 RX ADMIN — SODIUM CHLORIDE 200 MILLILITER(S): 9 INJECTION, SOLUTION INTRAVENOUS at 06:03

## 2024-01-04 RX ADMIN — ONDANSETRON 4 MILLIGRAM(S): 8 TABLET, FILM COATED ORAL at 22:18

## 2024-01-04 RX ADMIN — HYDROMORPHONE HYDROCHLORIDE 0.5 MILLIGRAM(S): 2 INJECTION INTRAMUSCULAR; INTRAVENOUS; SUBCUTANEOUS at 23:19

## 2024-01-04 RX ADMIN — MORPHINE SULFATE 4 MILLIGRAM(S): 50 CAPSULE, EXTENDED RELEASE ORAL at 11:07

## 2024-01-04 RX ADMIN — MORPHINE SULFATE 4 MILLIGRAM(S): 50 CAPSULE, EXTENDED RELEASE ORAL at 07:49

## 2024-01-04 RX ADMIN — MORPHINE SULFATE 4 MILLIGRAM(S): 50 CAPSULE, EXTENDED RELEASE ORAL at 06:59

## 2024-01-04 RX ADMIN — MORPHINE SULFATE 4 MILLIGRAM(S): 50 CAPSULE, EXTENDED RELEASE ORAL at 16:45

## 2024-01-04 RX ADMIN — HYDROMORPHONE HYDROCHLORIDE 1 MILLIGRAM(S): 2 INJECTION INTRAMUSCULAR; INTRAVENOUS; SUBCUTANEOUS at 18:24

## 2024-01-04 RX ADMIN — Medication 400 MILLIGRAM(S): at 13:55

## 2024-01-04 RX ADMIN — Medication 750 MILLIGRAM(S): at 04:45

## 2024-01-04 RX ADMIN — SODIUM CHLORIDE 200 MILLILITER(S): 9 INJECTION, SOLUTION INTRAVENOUS at 18:13

## 2024-01-04 RX ADMIN — Medication 5 MILLIGRAM(S): at 04:06

## 2024-01-04 RX ADMIN — MORPHINE SULFATE 4 MILLIGRAM(S): 50 CAPSULE, EXTENDED RELEASE ORAL at 12:00

## 2024-01-04 RX ADMIN — SODIUM CHLORIDE 150 MILLILITER(S): 9 INJECTION, SOLUTION INTRAVENOUS at 03:14

## 2024-01-04 RX ADMIN — MORPHINE SULFATE 4 MILLIGRAM(S): 50 CAPSULE, EXTENDED RELEASE ORAL at 01:45

## 2024-01-04 RX ADMIN — MORPHINE SULFATE 4 MILLIGRAM(S): 50 CAPSULE, EXTENDED RELEASE ORAL at 01:17

## 2024-01-04 RX ADMIN — HYDROMORPHONE HYDROCHLORIDE 1 MILLIGRAM(S): 2 INJECTION INTRAMUSCULAR; INTRAVENOUS; SUBCUTANEOUS at 19:00

## 2024-01-04 RX ADMIN — Medication 300 MILLIGRAM(S): at 04:05

## 2024-01-04 RX ADMIN — HYDROMORPHONE HYDROCHLORIDE 0.5 MILLIGRAM(S): 2 INJECTION INTRAMUSCULAR; INTRAVENOUS; SUBCUTANEOUS at 22:19

## 2024-01-04 RX ADMIN — MORPHINE SULFATE 4 MILLIGRAM(S): 50 CAPSULE, EXTENDED RELEASE ORAL at 17:00

## 2024-01-04 RX ADMIN — Medication 1000 MILLIGRAM(S): at 14:54

## 2024-01-04 RX ADMIN — Medication 30 MILLILITER(S): at 13:31

## 2024-01-04 RX ADMIN — SODIUM CHLORIDE 200 MILLILITER(S): 9 INJECTION, SOLUTION INTRAVENOUS at 08:35

## 2024-01-04 NOTE — PROGRESS NOTE ADULT - SUBJECTIVE AND OBJECTIVE BOX
Tammy Orellana MD   [facility] Medicine  Teams preferred  Pager: [phone]    Patient is a 48y old  Male who presents with a chief complaint of Acute pancreatitis (03 Jan 2024 23:47)      INTERVAL HPI/OVERNIGHT EVENTS: no issues overnight. THis AM though, abdominal pain radiating to back is worse. +nausea, no vomiting. trialed morphine 4mg with no significant relief. last night, dilaudid did help for about 2 hrs. IV tylenol is also helping    MEDICATIONS  (STANDING):  acetaminophen   IVPB .. 1000 milliGRAM(s) IV Intermittent once  dextrose 5%. 1000 milliLiter(s) (50 mL/Hr) IV Continuous <Continuous>  dextrose 5%. 1000 milliLiter(s) (100 mL/Hr) IV Continuous <Continuous>  dextrose 50% Injectable 25 Gram(s) IV Push once  dextrose 50% Injectable 25 Gram(s) IV Push once  dextrose 50% Injectable 12.5 Gram(s) IV Push once  enoxaparin Injectable 40 milliGRAM(s) SubCutaneous every 24 hours  glucagon  Injectable 1 milliGRAM(s) IntraMuscular once  insulin lispro (ADMELOG) corrective regimen sliding scale   SubCutaneous every 6 hours  lactated ringers. 1000 milliLiter(s) (200 mL/Hr) IV Continuous <Continuous>    MEDICATIONS  (PRN):  acetaminophen     Tablet .. 650 milliGRAM(s) Oral every 6 hours PRN Temp greater or equal to 38C (100.4F), Mild Pain (1 - 3), Moderate Pain (4 - 6), Severe Pain (7 - 10)  ALPRAZolam 1 milliGRAM(s) Oral at bedtime PRN for anxiety  aluminum hydroxide/magnesium hydroxide/simethicone Suspension 30 milliLiter(s) Oral every 6 hours PRN Dyspepsia  dextrose Oral Gel 15 Gram(s) Oral once PRN Blood Glucose LESS THAN 70 milliGRAM(s)/deciliter  morphine  - Injectable 4 milliGRAM(s) IV Push every 4 hours PRN Severe Pain (7 - 10)  morphine  - Injectable 2 milliGRAM(s) IV Push every 4 hours PRN Moderate Pain (4 - 6)  ondansetron Injectable 4 milliGRAM(s) IV Push every 8 hours PRN Nausea and/or Vomiting  polyethylene glycol 3350 17 Gram(s) Oral daily PRN Constipation  senna 2 Tablet(s) Oral at bedtime PRN Constipation      Allergies    No Known Allergies    Intolerances        REVIEW OF SYSTEMS:  Please see interval HPI:    Vital Signs Last 24 Hrs  T(C): 36.5 (04 Jan 2024 12:31), Max: 36.8 (03 Jan 2024 15:34)  T(F): 97.7 (04 Jan 2024 12:31), Max: 98.2 (03 Jan 2024 15:34)  HR: 53 (04 Jan 2024 12:31) (53 - 86)  BP: 176/78 (04 Jan 2024 12:31) (116/75 - 176/78)  BP(mean): --  RR: 17 (04 Jan 2024 12:31) (16 - 20)  SpO2: 98% (04 Jan 2024 12:31) (95% - 100%)    Parameters below as of 04 Jan 2024 12:31  Patient On (Oxygen Delivery Method): room air      I&O's Detail        PHYSICAL EXAM:  Vital Signs Last 24 Hrs  T(C): 36.5 (04 Jan 2024 12:31), Max: 36.8 (03 Jan 2024 15:34)  T(F): 97.7 (04 Jan 2024 12:31), Max: 98.2 (03 Jan 2024 15:34)  HR: 53 (04 Jan 2024 12:31) (53 - 86)  BP: 176/78 (04 Jan 2024 12:31) (116/75 - 176/78)  BP(mean): --  RR: 17 (04 Jan 2024 12:31) (16 - 20)  SpO2: 98% (04 Jan 2024 12:31) (95% - 100%)    Parameters below as of 04 Jan 2024 12:31  Patient On (Oxygen Delivery Method): room air      CONSTITUTIONAL: NAD,   ENMT: Moist oral mucosa,   RESPIRATORY: Normal respiratory effort; lungs are clear to auscultation bilaterally  CARDIOVASCULAR: Regular rate and rhythm, normal S1 and S2, no murmur/rub/gallop; No lower extremity edema;   ABDOMEN: diffuse abdominal pain, no guarding, normoactive bowel sounds, no rebound/guarding; No hepatosplenomegaly  EXT: no edema b/l  NEUROLOGY: alert, following commands  SKIN: No rashes; no palpable lesions      LABS:                        15.1   10.06 )-----------( 226      ( 04 Jan 2024 06:50 )             45.5     04 Jan 2024 06:50    139    |  103    |  7      ----------------------------<  93     4.4     |  25     |  0.87     Ca    9.4        04 Jan 2024 06:50  Phos  2.8       04 Jan 2024 06:50  Mg     2.10      04 Jan 2024 06:50    TPro  7.3    /  Alb  4.4    /  TBili  0.6    /  DBili  x      /  AST  45     /  ALT  57     /  AlkPhos  72     04 Jan 2024 06:50    PT/INR - ( 04 Jan 2024 06:50 )   PT: 11.9 sec;   INR: 1.05 ratio         PTT - ( 04 Jan 2024 06:50 )  PTT:31.4 sec  CAPILLARY BLOOD GLUCOSE      POCT Blood Glucose.: 89 mg/dL (04 Jan 2024 12:04)  POCT Blood Glucose.: 94 mg/dL (04 Jan 2024 06:24)  POCT Blood Glucose.: 125 mg/dL (03 Jan 2024 15:37)    BLOOD CULTURE    RADIOLOGY & ADDITIONAL TESTS:    Imaging Personally Reviewed:  [ ] YES     Consultant(s) Notes Reviewed:      Care Discussed with Consultants/Other Providers: Tammy Orellana MD   [facility] Medicine  Teams preferred  Pager: [phone]    Patient is a 48y old  Male who presents with a chief complaint of Acute pancreatitis (03 Jan 2024 23:47)      INTERVAL HPI/OVERNIGHT EVENTS: no issues overnight. THis AM though, abdominal pain radiating to back is worse. +nausea, no vomiting. trialed morphine 4mg with no significant relief. last night, dilaudid did help for about 2 hrs. IV tylenol is also helping    MEDICATIONS  (STANDING):  acetaminophen   IVPB .. 1000 milliGRAM(s) IV Intermittent once  dextrose 5%. 1000 milliLiter(s) (50 mL/Hr) IV Continuous <Continuous>  dextrose 5%. 1000 milliLiter(s) (100 mL/Hr) IV Continuous <Continuous>  dextrose 50% Injectable 25 Gram(s) IV Push once  dextrose 50% Injectable 25 Gram(s) IV Push once  dextrose 50% Injectable 12.5 Gram(s) IV Push once  enoxaparin Injectable 40 milliGRAM(s) SubCutaneous every 24 hours  glucagon  Injectable 1 milliGRAM(s) IntraMuscular once  insulin lispro (ADMELOG) corrective regimen sliding scale   SubCutaneous every 6 hours  lactated ringers. 1000 milliLiter(s) (200 mL/Hr) IV Continuous <Continuous>    MEDICATIONS  (PRN):  acetaminophen     Tablet .. 650 milliGRAM(s) Oral every 6 hours PRN Temp greater or equal to 38C (100.4F), Mild Pain (1 - 3), Moderate Pain (4 - 6), Severe Pain (7 - 10)  ALPRAZolam 1 milliGRAM(s) Oral at bedtime PRN for anxiety  aluminum hydroxide/magnesium hydroxide/simethicone Suspension 30 milliLiter(s) Oral every 6 hours PRN Dyspepsia  dextrose Oral Gel 15 Gram(s) Oral once PRN Blood Glucose LESS THAN 70 milliGRAM(s)/deciliter  morphine  - Injectable 4 milliGRAM(s) IV Push every 4 hours PRN Severe Pain (7 - 10)  morphine  - Injectable 2 milliGRAM(s) IV Push every 4 hours PRN Moderate Pain (4 - 6)  ondansetron Injectable 4 milliGRAM(s) IV Push every 8 hours PRN Nausea and/or Vomiting  polyethylene glycol 3350 17 Gram(s) Oral daily PRN Constipation  senna 2 Tablet(s) Oral at bedtime PRN Constipation      Allergies    No Known Allergies    Intolerances        REVIEW OF SYSTEMS:  Please see interval HPI:    Vital Signs Last 24 Hrs  T(C): 36.5 (04 Jan 2024 12:31), Max: 36.8 (03 Jan 2024 15:34)  T(F): 97.7 (04 Jan 2024 12:31), Max: 98.2 (03 Jan 2024 15:34)  HR: 53 (04 Jan 2024 12:31) (53 - 86)  BP: 176/78 (04 Jan 2024 12:31) (116/75 - 176/78)  BP(mean): --  RR: 17 (04 Jan 2024 12:31) (16 - 20)  SpO2: 98% (04 Jan 2024 12:31) (95% - 100%)    Parameters below as of 04 Jan 2024 12:31  Patient On (Oxygen Delivery Method): room air      I&O's Detail        PHYSICAL EXAM:  Vital Signs Last 24 Hrs  T(C): 36.5 (04 Jan 2024 12:31), Max: 36.8 (03 Jan 2024 15:34)  T(F): 97.7 (04 Jan 2024 12:31), Max: 98.2 (03 Jan 2024 15:34)  HR: 53 (04 Jan 2024 12:31) (53 - 86)  BP: 176/78 (04 Jan 2024 12:31) (116/75 - 176/78)  BP(mean): --  RR: 17 (04 Jan 2024 12:31) (16 - 20)  SpO2: 98% (04 Jan 2024 12:31) (95% - 100%)    Parameters below as of 04 Jan 2024 12:31  Patient On (Oxygen Delivery Method): room air      CONSTITUTIONAL: NAD,   ENMT: Moist oral mucosa,   RESPIRATORY: Normal respiratory effort; lungs are clear to auscultation bilaterally  CARDIOVASCULAR: Regular rate and rhythm, normal S1 and S2, no murmur/rub/gallop; No lower extremity edema;   ABDOMEN: diffuse abdominal pain, no guarding, normoactive bowel sounds, no rebound/guarding; No hepatosplenomegaly  EXT: no edema b/l  NEUROLOGY: alert, following commands  SKIN: No rashes; no palpable lesions      LABS:                        15.1   10.06 )-----------( 226      ( 04 Jan 2024 06:50 )             45.5     04 Jan 2024 06:50    139    |  103    |  7      ----------------------------<  93     4.4     |  25     |  0.87     Ca    9.4        04 Jan 2024 06:50  Phos  2.8       04 Jan 2024 06:50  Mg     2.10      04 Jan 2024 06:50    TPro  7.3    /  Alb  4.4    /  TBili  0.6    /  DBili  x      /  AST  45     /  ALT  57     /  AlkPhos  72     04 Jan 2024 06:50    PT/INR - ( 04 Jan 2024 06:50 )   PT: 11.9 sec;   INR: 1.05 ratio         PTT - ( 04 Jan 2024 06:50 )  PTT:31.4 sec  CAPILLARY BLOOD GLUCOSE      POCT Blood Glucose.: 89 mg/dL (04 Jan 2024 12:04)  POCT Blood Glucose.: 94 mg/dL (04 Jan 2024 06:24)  POCT Blood Glucose.: 125 mg/dL (03 Jan 2024 15:37)    BLOOD CULTURE    RADIOLOGY & ADDITIONAL TESTS:    Imaging Personally Reviewed:  [ ] YES     Consultant(s) Notes Reviewed:      Care Discussed with Consultants/Other Providers:

## 2024-01-04 NOTE — PROGRESS NOTE ADULT - PROBLEM SELECTOR PLAN 2
WBC 12.91 on admission. Pt did not meet SIRS criteria, however. Leukocytosis likely in setting of acute pancreatitis. Pt afebrile.   - Trend CBC with diff daily  - Monitor off antibiotics for now  - F/u CTAP with IV contrast

## 2024-01-04 NOTE — PROGRESS NOTE ADULT - ASSESSMENT
49 yo man with history of recurrent pancreatitis, ?asthma/COPD (not on home O2), rheumatoid arthritis (not on any meds),  type 2 DM (not on insulin), anxiety, and alcohol use disorder (sober for last ~20 years) presents with worsening epigastric pain that started shortly after pt was discharged from [facility] on 12/19/23, admitted with likely acute pancreatitis. Pain syndrome and elevated lipase is consistent.  47 yo man with history of recurrent pancreatitis, ?asthma/COPD (not on home O2), rheumatoid arthritis (not on any meds),  type 2 DM (not on insulin), anxiety, and alcohol use disorder (sober for last ~20 years) presents with worsening epigastric pain that started shortly after pt was discharged from [facility] on 12/19/23, admitted with likely acute pancreatitis. Pain syndrome and elevated lipase is consistent.

## 2024-01-04 NOTE — CONSULT NOTE ADULT - SUBJECTIVE AND OBJECTIVE BOX
Chief Complaint:  Patient is a 48y old  Male who presents with a chief complaint of Acute pancreatitis (04 Jan 2024 13:37)    HPI:  48M with history of recurrent pancreatitis, ?asthma/COPD, rheumatoid arthritis (not on any meds), type 2 DM (no SGLT2), anxiety, and alcohol use disorder (sober for last ~20 years) presented with worsening epigastric pain that started shortly after pt was discharged from [facility] on 12/19/23. Pt was hospitalized at [facility] from 12/18-12/19/23 for upper abdominal pain found to be 2/2 mild acute interstitial pancreatitis with secondary inflammation of the duodenum. Pt received IVF and pain control with IV morphine while there and had symptomatic improvement. However, after pt returned home on 12/19/23, pt had 2 meals before he started having recurrent epigastric pain. Since then, he tried clear liquids and cereal/crackers with some improvement in his symptoms. Pt describes the pain as a "squeezing his gut into a ball" sensation, with the pain radiating to the center of his back, then laterally to both sides of his back. The pain is associated with nausea, though no vomiting, and poor PO intake and chills yesterday. Last BM yesterday.    Of note, pt has had multiple episodes of acute pancreatitis, dating back to 2011. Previously, triggers were thought to be alcohol (now sober for 20 years), gallstones s/p cholecystectomy 2009, methotrexate (for underlying RA; now off for 5 years). Of note, pt does vape with nicotine daily.    In the ED,  T 98.1-98.2, HR 65-86, -175/76-91, RR 16-20, SpO2 100% RA.  Lipase 1282.   (03 Jan 2024 23:47)      Allergies:  No Known Allergies      Home Medications:    Hospital Medications:  acetaminophen     Tablet .. 650 milliGRAM(s) Oral every 6 hours PRN  ALPRAZolam 1 milliGRAM(s) Oral at bedtime PRN  aluminum hydroxide/magnesium hydroxide/simethicone Suspension 30 milliLiter(s) Oral every 6 hours PRN  dextrose 5%. 1000 milliLiter(s) IV Continuous <Continuous>  dextrose 5%. 1000 milliLiter(s) IV Continuous <Continuous>  dextrose 50% Injectable 12.5 Gram(s) IV Push once  dextrose 50% Injectable 25 Gram(s) IV Push once  dextrose 50% Injectable 25 Gram(s) IV Push once  dextrose Oral Gel 15 Gram(s) Oral once PRN  enoxaparin Injectable 40 milliGRAM(s) SubCutaneous every 24 hours  glucagon  Injectable 1 milliGRAM(s) IntraMuscular once  insulin lispro (ADMELOG) corrective regimen sliding scale   SubCutaneous every 6 hours  lactated ringers. 1000 milliLiter(s) IV Continuous <Continuous>  morphine  - Injectable 2 milliGRAM(s) IV Push every 4 hours PRN  morphine  - Injectable 4 milliGRAM(s) IV Push every 4 hours PRN  ondansetron Injectable 4 milliGRAM(s) IV Push every 8 hours PRN  polyethylene glycol 3350 17 Gram(s) Oral daily PRN  senna 2 Tablet(s) Oral at bedtime PRN      PMHX/PSHX:  Acute Pancreatitis    Anxiety    Asthma    DM (diabetes mellitus)    Anxiety    COPD with asthma    Rheumatoid arthritis    History of alcohol use disorder    History of Cholecystectomy        Family history:  Family history of diabetes mellitus    Family history of breast cancer    FH: colon cancer    No family history of COPD    FH: COPD (chronic obstructive pulmonary disease)    FH: breast cancer        Denies family history of colon cancer/polyps, stomach cancer/polyps, pancreatic cancer/masses, liver cancer/disease, ovarian cancer and endometrial cancer.    Social History:     Tob: Denies  EtOH: Denies  Illicit Drugs: Denies    ROS:     General:  No wt loss, fevers, chills  ENT:  No dysphagia  CV:  No pain, palpitations  Pulm:  No dyspnea, cough  GI:  (+) pain, (+) nausea, No vomiting, No diarrhea, No constipation, No rectal bleeding, No tarry stools, No dysphagia,  Muscle:  No pain, weakness  Neuro:  No weakness  Heme:  No ecchymosis  Skin:  No rash    PHYSICAL EXAM:     GENERAL:  No acute distress  HEENT:  no scleral icterus  CHEST:  no accessory muscle use  HEART:  Regular rate and rhythm  ABDOMEN:  Soft, diffuse tenderness, non-distended  EXTREMITIES: No edema  SKIN:  No rash/ecchymoses  NEURO:  Alert and oriented x 3    Vital Signs:  Vital Signs Last 24 Hrs  T(C): 36.5 (04 Jan 2024 12:31), Max: 36.8 (03 Jan 2024 15:34)  T(F): 97.7 (04 Jan 2024 12:31), Max: 98.2 (03 Jan 2024 15:34)  HR: 53 (04 Jan 2024 12:31) (53 - 86)  BP: 176/78 (04 Jan 2024 12:31) (116/75 - 176/78)  BP(mean): --  RR: 17 (04 Jan 2024 12:31) (16 - 20)  SpO2: 98% (04 Jan 2024 12:31) (95% - 100%)    Parameters below as of 04 Jan 2024 12:31  Patient On (Oxygen Delivery Method): room air      Daily Height in cm: 177.8 (03 Jan 2024 15:34)    Daily     LABS:                        15.1   10.06 )-----------( 226      ( 04 Jan 2024 06:50 )             45.5     Mean Cell Volume: 82.4 fL (01-04-24 @ 06:50)    01-04    139  |  103  |  7   ----------------------------<  93  4.4   |  25  |  0.87    Ca    9.4      04 Jan 2024 06:50  Phos  2.8     01-04  Mg     2.10     01-04    TPro  7.3  /  Alb  4.4  /  TBili  0.6  /  DBili  x   /  AST  45<H>  /  ALT  57<H>  /  AlkPhos  72  01-04    LIVER FUNCTIONS - ( 04 Jan 2024 06:50 )  Alb: 4.4 g/dL / Pro: 7.3 g/dL / ALK PHOS: 72 U/L / ALT: 57 U/L / AST: 45 U/L / GGT: x           PT/INR - ( 04 Jan 2024 06:50 )   PT: 11.9 sec;   INR: 1.05 ratio         PTT - ( 04 Jan 2024 06:50 )  PTT:31.4 sec  Urinalysis Basic - ( 04 Jan 2024 06:50 )    Color: x / Appearance: x / SG: x / pH: x  Gluc: 93 mg/dL / Ketone: x  / Bili: x / Urobili: x   Blood: x / Protein: x / Nitrite: x   Leuk Esterase: x / RBC: x / WBC x   Sq Epi: x / Non Sq Epi: x / Bacteria: x      Amylase Serum--      Lipase [id number]       Ammonia--                          15.1   10.06 )-----------( 226      ( 04 Jan 2024 06:50 )             45.5                         16.0   12.91 )-----------( 262      ( 03 Jan 2024 17:27 )             47.9   Chief Complaint:  Patient is a 48y old  Male who presents with a chief complaint of Acute pancreatitis (04 Jan 2024 13:37)    HPI:  48M with history of recurrent pancreatitis, ?asthma/COPD, rheumatoid arthritis (not on any meds), type 2 DM (no SGLT2), anxiety, and alcohol use disorder (sober for last ~20 years) presented with worsening epigastric pain that started shortly after pt was discharged from [facility] on 12/19/23. Pt was hospitalized at [facility] from 12/18-12/19/23 for upper abdominal pain found to be 2/2 mild acute interstitial pancreatitis with secondary inflammation of the duodenum. Pt received IVF and pain control with IV morphine while there and had symptomatic improvement. However, after pt returned home on 12/19/23, pt had 2 meals before he started having recurrent epigastric pain. Since then, he tried clear liquids and cereal/crackers with some improvement in his symptoms. Pt describes the pain as a "squeezing his gut into a ball" sensation, with the pain radiating to the center of his back, then laterally to both sides of his back. The pain is associated with nausea, though no vomiting, and poor PO intake and chills yesterday. Last BM yesterday.    Of note, pt has had multiple episodes of acute pancreatitis, dating back to 2011. Previously, triggers were thought to be alcohol (now sober for 20 years), gallstones s/p cholecystectomy 2009, methotrexate (for underlying RA; now off for 5 years). Of note, pt does vape with nicotine daily.    In the ED,  T 98.1-98.2, HR 65-86, -175/76-91, RR 16-20, SpO2 100% RA.  Lipase 1282.   (03 Jan 2024 23:47)      Allergies:  No Known Allergies      Home Medications:    Hospital Medications:  acetaminophen     Tablet .. 650 milliGRAM(s) Oral every 6 hours PRN  ALPRAZolam 1 milliGRAM(s) Oral at bedtime PRN  aluminum hydroxide/magnesium hydroxide/simethicone Suspension 30 milliLiter(s) Oral every 6 hours PRN  dextrose 5%. 1000 milliLiter(s) IV Continuous <Continuous>  dextrose 5%. 1000 milliLiter(s) IV Continuous <Continuous>  dextrose 50% Injectable 12.5 Gram(s) IV Push once  dextrose 50% Injectable 25 Gram(s) IV Push once  dextrose 50% Injectable 25 Gram(s) IV Push once  dextrose Oral Gel 15 Gram(s) Oral once PRN  enoxaparin Injectable 40 milliGRAM(s) SubCutaneous every 24 hours  glucagon  Injectable 1 milliGRAM(s) IntraMuscular once  insulin lispro (ADMELOG) corrective regimen sliding scale   SubCutaneous every 6 hours  lactated ringers. 1000 milliLiter(s) IV Continuous <Continuous>  morphine  - Injectable 2 milliGRAM(s) IV Push every 4 hours PRN  morphine  - Injectable 4 milliGRAM(s) IV Push every 4 hours PRN  ondansetron Injectable 4 milliGRAM(s) IV Push every 8 hours PRN  polyethylene glycol 3350 17 Gram(s) Oral daily PRN  senna 2 Tablet(s) Oral at bedtime PRN      PMHX/PSHX:  Acute Pancreatitis    Anxiety    Asthma    DM (diabetes mellitus)    Anxiety    COPD with asthma    Rheumatoid arthritis    History of alcohol use disorder    History of Cholecystectomy        Family history:  Family history of diabetes mellitus    Family history of breast cancer    FH: colon cancer    No family history of COPD    FH: COPD (chronic obstructive pulmonary disease)    FH: breast cancer        Denies family history of colon cancer/polyps, stomach cancer/polyps, pancreatic cancer/masses, liver cancer/disease, ovarian cancer and endometrial cancer.    Social History:     Tob: Denies  EtOH: Denies  Illicit Drugs: Denies    ROS:     General:  No wt loss, fevers, chills  ENT:  No dysphagia  CV:  No pain, palpitations  Pulm:  No dyspnea, cough  GI:  (+) pain, (+) nausea, No vomiting, No diarrhea, No constipation, No rectal bleeding, No tarry stools, No dysphagia,  Muscle:  No pain, weakness  Neuro:  No weakness  Heme:  No ecchymosis  Skin:  No rash    PHYSICAL EXAM:     GENERAL:  No acute distress  HEENT:  no scleral icterus  CHEST:  no accessory muscle use  HEART:  Regular rate and rhythm  ABDOMEN:  Soft, diffuse tenderness, non-distended  EXTREMITIES: No edema  SKIN:  No rash/ecchymoses  NEURO:  Alert and oriented x 3    Vital Signs:  Vital Signs Last 24 Hrs  T(C): 36.5 (04 Jan 2024 12:31), Max: 36.8 (03 Jan 2024 15:34)  T(F): 97.7 (04 Jan 2024 12:31), Max: 98.2 (03 Jan 2024 15:34)  HR: 53 (04 Jan 2024 12:31) (53 - 86)  BP: 176/78 (04 Jan 2024 12:31) (116/75 - 176/78)  BP(mean): --  RR: 17 (04 Jan 2024 12:31) (16 - 20)  SpO2: 98% (04 Jan 2024 12:31) (95% - 100%)    Parameters below as of 04 Jan 2024 12:31  Patient On (Oxygen Delivery Method): room air      Daily Height in cm: 177.8 (03 Jan 2024 15:34)    Daily     LABS:                        15.1   10.06 )-----------( 226      ( 04 Jan 2024 06:50 )             45.5     Mean Cell Volume: 82.4 fL (01-04-24 @ 06:50)    01-04    139  |  103  |  7   ----------------------------<  93  4.4   |  25  |  0.87    Ca    9.4      04 Jan 2024 06:50  Phos  2.8     01-04  Mg     2.10     01-04    TPro  7.3  /  Alb  4.4  /  TBili  0.6  /  DBili  x   /  AST  45<H>  /  ALT  57<H>  /  AlkPhos  72  01-04    LIVER FUNCTIONS - ( 04 Jan 2024 06:50 )  Alb: 4.4 g/dL / Pro: 7.3 g/dL / ALK PHOS: 72 U/L / ALT: 57 U/L / AST: 45 U/L / GGT: x           PT/INR - ( 04 Jan 2024 06:50 )   PT: 11.9 sec;   INR: 1.05 ratio         PTT - ( 04 Jan 2024 06:50 )  PTT:31.4 sec  Urinalysis Basic - ( 04 Jan 2024 06:50 )    Color: x / Appearance: x / SG: x / pH: x  Gluc: 93 mg/dL / Ketone: x  / Bili: x / Urobili: x   Blood: x / Protein: x / Nitrite: x   Leuk Esterase: x / RBC: x / WBC x   Sq Epi: x / Non Sq Epi: x / Bacteria: x      Amylase Serum--      Lipase [id number]       Ammonia--                          15.1   10.06 )-----------( 226      ( 04 Jan 2024 06:50 )             45.5                         16.0   12.91 )-----------( 262      ( 03 Jan 2024 17:27 )             47.9   HPI:  48M with history of recurrent pancreatitis, ?asthma/COPD, rheumatoid arthritis (not on any meds), type 2 DM (no SGLT2), anxiety, and alcohol use disorder (sober for last ~20 years) presented with worsening epigastric pain that started shortly after pt was discharged from [facility] on 12/19/23. Pt was hospitalized at [place] from 12/18-12/19/23 for upper abdominal pain found to be 2/2 mild acute interstitial pancreatitis with secondary inflammation of the duodenum. Pt received IVF and pain control with IV morphine while there and had symptomatic improvement. However, after pt returned home on 12/19/23, pt had 2 meals before he started having recurrent epigastric pain. Since then, he tried clear liquids and cereal/crackers with some improvement in his symptoms. Pt describes the pain as a "squeezing his gut into a ball" sensation, with the pain radiating to the center of his back, then laterally to both sides of his back. The pain is associated with nausea, though no vomiting, and poor PO intake and chills yesterday. Last BM yesterday.    Of note, pt has had multiple episodes of acute pancreatitis, dating back to 2011. Previously, triggers were thought to be alcohol (now sober for 20 years), gallstones s/p cholecystectomy 2009, methotrexate (for underlying RA; now off for 5 years). Of note, pt does vape with nicotine daily.    In the ED,  T 98.1-98.2, HR 65-86, -175/76-91, RR 16-20, SpO2 100% RA.  Lipase 1282.   (03 Jan 2024 23:47)      Allergies:  No Known Allergies      Home Medications:    Hospital Medications:  acetaminophen     Tablet .. 650 milliGRAM(s) Oral every 6 hours PRN  ALPRAZolam 1 milliGRAM(s) Oral at bedtime PRN  aluminum hydroxide/magnesium hydroxide/simethicone Suspension 30 milliLiter(s) Oral every 6 hours PRN  dextrose 5%. 1000 milliLiter(s) IV Continuous <Continuous>  dextrose 5%. 1000 milliLiter(s) IV Continuous <Continuous>  dextrose 50% Injectable 12.5 Gram(s) IV Push once  dextrose 50% Injectable 25 Gram(s) IV Push once  dextrose 50% Injectable 25 Gram(s) IV Push once  dextrose Oral Gel 15 Gram(s) Oral once PRN  enoxaparin Injectable 40 milliGRAM(s) SubCutaneous every 24 hours  glucagon  Injectable 1 milliGRAM(s) IntraMuscular once  insulin lispro (ADMELOG) corrective regimen sliding scale   SubCutaneous every 6 hours  lactated ringers. 1000 milliLiter(s) IV Continuous <Continuous>  morphine  - Injectable 2 milliGRAM(s) IV Push every 4 hours PRN  morphine  - Injectable 4 milliGRAM(s) IV Push every 4 hours PRN  ondansetron Injectable 4 milliGRAM(s) IV Push every 8 hours PRN  polyethylene glycol 3350 17 Gram(s) Oral daily PRN  senna 2 Tablet(s) Oral at bedtime PRN      PMHX/PSHX:  Acute Pancreatitis    Anxiety    Asthma    DM (diabetes mellitus)    Anxiety    COPD with asthma    Rheumatoid arthritis    History of alcohol use disorder    History of Cholecystectomy        Family history:  Family history of diabetes mellitus    Family history of breast cancer    FH: colon cancer    No family history of COPD    FH: COPD (chronic obstructive pulmonary disease)    FH: breast cancer    Social History:     Tob: Denies  EtOH: Denies  Illicit Drugs: Denies    ROS:     General:  No wt loss, fevers, chills  ENT:  No dysphagia  CV:  No pain, palpitations  Pulm:  No dyspnea, cough  GI:  (+) pain, (+) nausea, No vomiting, No diarrhea, No constipation, No rectal bleeding, No tarry stools, No dysphagia,  Muscle:  No pain, weakness  Neuro:  No weakness  Heme:  No ecchymosis  Skin:  No rash    PHYSICAL EXAM:     GENERAL:  No acute distress  HEENT:  no scleral icterus  CHEST:  no accessory muscle use  HEART:  Regular rate and rhythm  ABDOMEN:  Soft, diffuse tenderness, non-distended  EXTREMITIES: No edema  SKIN:  No rash/ecchymoses  NEURO:  Alert and oriented x 3    Vital Signs:  Vital Signs Last 24 Hrs  T(C): 36.5 (04 Jan 2024 12:31), Max: 36.8 (03 Jan 2024 15:34)  T(F): 97.7 (04 Jan 2024 12:31), Max: 98.2 (03 Jan 2024 15:34)  HR: 53 (04 Jan 2024 12:31) (53 - 86)  BP: 176/78 (04 Jan 2024 12:31) (116/75 - 176/78)  BP(mean): --  RR: 17 (04 Jan 2024 12:31) (16 - 20)  SpO2: 98% (04 Jan 2024 12:31) (95% - 100%)    Parameters below as of 04 Jan 2024 12:31  Patient On (Oxygen Delivery Method): room air      Daily Height in cm: 177.8 (03 Jan 2024 15:34)    Daily     LABS:                        15.1   10.06 )-----------( 226      ( 04 Jan 2024 06:50 )             45.5     Mean Cell Volume: 82.4 fL (01-04-24 @ 06:50)    01-04    139  |  103  |  7   ----------------------------<  93  4.4   |  25  |  0.87    Ca    9.4      04 Jan 2024 06:50  Phos  2.8     01-04  Mg     2.10     01-04    TPro  7.3  /  Alb  4.4  /  TBili  0.6  /  DBili  x   /  AST  45<H>  /  ALT  57<H>  /  AlkPhos  72  01-04    LIVER FUNCTIONS - ( 04 Jan 2024 06:50 )  Alb: 4.4 g/dL / Pro: 7.3 g/dL / ALK PHOS: 72 U/L / ALT: 57 U/L / AST: 45 U/L / GGT: x           PT/INR - ( 04 Jan 2024 06:50 )   PT: 11.9 sec;   INR: 1.05 ratio         PTT - ( 04 Jan 2024 06:50 )  PTT:31.4 sec  Urinalysis Basic - ( 04 Jan 2024 06:50 )    Color: x / Appearance: x / SG: x / pH: x  Gluc: 93 mg/dL / Ketone: x  / Bili: x / Urobili: x   Blood: x / Protein: x / Nitrite: x   Leuk Esterase: x / RBC: x / WBC x   Sq Epi: x / Non Sq Epi: x / Bacteria: x      Amylase Serum--      Lipase [id number]       Ammonia--                          15.1   10.06 )-----------( 226      ( 04 Jan 2024 06:50 )             45.5                         16.0   12.91 )-----------( 262      ( 03 Jan 2024 17:27 )             47.9   HPI:  48M with history of recurrent pancreatitis, ?asthma/COPD, rheumatoid arthritis (not on any meds), type 2 DM (no SGLT2), anxiety, and alcohol use disorder (sober for last ~20 years) presented with worsening epigastric pain that started shortly after pt was discharged from [facility] on 12/19/23. Pt was hospitalized at [place] from 12/18-12/19/23 for upper abdominal pain found to be 2/2 mild acute interstitial pancreatitis with secondary inflammation of the duodenum. Pt received IVF and pain control with IV morphine while there and had symptomatic improvement. However, after pt returned home on 12/19/23, pt had 2 meals before he started having recurrent epigastric pain. Since then, he tried clear liquids and cereal/crackers with some improvement in his symptoms. Pt describes the pain as a "squeezing his gut into a ball" sensation, with the pain radiating to the center of his back, then laterally to both sides of his back. The pain is associated with nausea, though no vomiting, and poor PO intake and chills yesterday. Last BM yesterday.    Of note, pt has had multiple episodes of acute pancreatitis, dating back to 2011. Previously, triggers were thought to be alcohol (now sober for 20 years), gallstones s/p cholecystectomy 2009, methotrexate (for underlying RA; now off for 5 years). Of note, pt does vape with nicotine daily.    In the ED,  T 98.1-98.2, HR 65-86, -175/76-91, RR 16-20, SpO2 100% RA.  Lipase 1282.   (03 Jan 2024 23:47)      Allergies:  No Known Allergies      Home Medications:    Hospital Medications:  acetaminophen     Tablet .. 650 milliGRAM(s) Oral every 6 hours PRN  ALPRAZolam 1 milliGRAM(s) Oral at bedtime PRN  aluminum hydroxide/magnesium hydroxide/simethicone Suspension 30 milliLiter(s) Oral every 6 hours PRN  dextrose 5%. 1000 milliLiter(s) IV Continuous <Continuous>  dextrose 5%. 1000 milliLiter(s) IV Continuous <Continuous>  dextrose 50% Injectable 12.5 Gram(s) IV Push once  dextrose 50% Injectable 25 Gram(s) IV Push once  dextrose 50% Injectable 25 Gram(s) IV Push once  dextrose Oral Gel 15 Gram(s) Oral once PRN  enoxaparin Injectable 40 milliGRAM(s) SubCutaneous every 24 hours  glucagon  Injectable 1 milliGRAM(s) IntraMuscular once  insulin lispro (ADMELOG) corrective regimen sliding scale   SubCutaneous every 6 hours  lactated ringers. 1000 milliLiter(s) IV Continuous <Continuous>  morphine  - Injectable 2 milliGRAM(s) IV Push every 4 hours PRN  morphine  - Injectable 4 milliGRAM(s) IV Push every 4 hours PRN  ondansetron Injectable 4 milliGRAM(s) IV Push every 8 hours PRN  polyethylene glycol 3350 17 Gram(s) Oral daily PRN  senna 2 Tablet(s) Oral at bedtime PRN      PMHX/PSHX:  Acute Pancreatitis    Anxiety    Asthma    DM (diabetes mellitus)    Anxiety    COPD with asthma    Rheumatoid arthritis    History of alcohol use disorder    History of Cholecystectomy        Family history:  Family history of diabetes mellitus    Family history of breast cancer    FH: colon cancer    No family history of COPD    FH: COPD (chronic obstructive pulmonary disease)    FH: breast cancer    Social History:     Tob: Denies  EtOH: Denies  Illicit Drugs: Denies    ROS:     General:  No wt loss, fevers, chills  ENT:  No dysphagia  CV:  No pain, palpitations  Pulm:  No dyspnea, cough  GI:  (+) pain, (+) nausea, No vomiting, No diarrhea, No constipation, No rectal bleeding, No tarry stools, No dysphagia,  Muscle:  No pain, weakness  Neuro:  No weakness  Heme:  No ecchymosis  Skin:  No rash    PHYSICAL EXAM:     GENERAL:  No acute distress  HEENT:  no scleral icterus  CHEST:  no accessory muscle use  HEART:  Regular rate and rhythm  ABDOMEN:  Soft, diffuse tenderness, non-distended  EXTREMITIES: No edema  SKIN:  No rash/ecchymoses  NEURO:  Alert and oriented x 3    Vital Signs:  Vital Signs Last 24 Hrs  T(C): 36.5 (04 Jan 2024 12:31), Max: 36.8 (03 Jan 2024 15:34)  T(F): 97.7 (04 Jan 2024 12:31), Max: 98.2 (03 Jan 2024 15:34)  HR: 53 (04 Jan 2024 12:31) (53 - 86)  BP: 176/78 (04 Jan 2024 12:31) (116/75 - 176/78)  BP(mean): --  RR: 17 (04 Jan 2024 12:31) (16 - 20)  SpO2: 98% (04 Jan 2024 12:31) (95% - 100%)    Parameters below as of 04 Jan 2024 12:31  Patient On (Oxygen Delivery Method): room air      Daily Height in cm: 177.8 (03 Jan 2024 15:34)    Daily     LABS:                        15.1   10.06 )-----------( 226      ( 04 Jan 2024 06:50 )             45.5     Mean Cell Volume: 82.4 fL (01-04-24 @ 06:50)    01-04    139  |  103  |  7   ----------------------------<  93  4.4   |  25  |  0.87    Ca    9.4      04 Jan 2024 06:50  Phos  2.8     01-04  Mg     2.10     01-04    TPro  7.3  /  Alb  4.4  /  TBili  0.6  /  DBili  x   /  AST  45<H>  /  ALT  57<H>  /  AlkPhos  72  01-04    LIVER FUNCTIONS - ( 04 Jan 2024 06:50 )  Alb: 4.4 g/dL / Pro: 7.3 g/dL / ALK PHOS: 72 U/L / ALT: 57 U/L / AST: 45 U/L / GGT: x           PT/INR - ( 04 Jan 2024 06:50 )   PT: 11.9 sec;   INR: 1.05 ratio         PTT - ( 04 Jan 2024 06:50 )  PTT:31.4 sec  Urinalysis Basic - ( 04 Jan 2024 06:50 )    Color: x / Appearance: x / SG: x / pH: x  Gluc: 93 mg/dL / Ketone: x  / Bili: x / Urobili: x   Blood: x / Protein: x / Nitrite: x   Leuk Esterase: x / RBC: x / WBC x   Sq Epi: x / Non Sq Epi: x / Bacteria: x      Amylase Serum--      Lipase [id number]       Ammonia--                          15.1   10.06 )-----------( 226      ( 04 Jan 2024 06:50 )             45.5                         16.0   12.91 )-----------( 262      ( 03 Jan 2024 17:27 )             47.9

## 2024-01-04 NOTE — PROGRESS NOTE ADULT - PROBLEM SELECTOR PLAN 3
Pt with marked sinus dorian to 47 bpm on admission EKG. Pt HD stable and asymptomatic from bradycardia. Likely near pt's baseline resting HR, as pt noted to have HR in low 60s at [place].   - No AV blocks noted on admission EKG  - WNL  - Monitor VS q4hrs Pt with marked sinus dorian to 47 bpm on admission EKG. Pt HD stable and asymptomatic from bradycardia. Likely near pt's baseline resting HR, as pt noted to have HR in low 60s at [place].   - No AV blocks noted on admission EKG  - WNL  - Monitor VS q4hrs

## 2024-01-04 NOTE — PROGRESS NOTE ADULT - PROBLEM SELECTOR PLAN 7
Pt denies any history of COPD or asthma, although documented in pt's EMR that he has COPD and asthma. Pt not on any inhalers at home.  - Monitor respiratory status  - Supplemental O2 PRN

## 2024-01-04 NOTE — PROGRESS NOTE ADULT - PROBLEM SELECTOR PLAN 1
Pt presenting with worsening epigastric pain x2 weeks, associated with nausea and poor PO intake. Lipase elevated to 1282. Most likely acute pancreatitis, though unclear etiology. Pt denies any current alcohol use. TG 76 on admission. IgG4 in Dec 2023 wnl. Possibly from nicotine vaping? unclear why Pt regressed/relapsed. Previous CT scan reviewed, no anatomical susceptibilities.    - S/p 2L bolus of NS in ED. C/w IVF with LR at 200 cc/hr x12 hrs for now, reassess thereafter.  - c/w NPO for today. if improves can advance to [facility]  - CTAP with IV contrast ordered to r/o any complications of pancreatitis, such as acute peripancreatic fluid collection, pancreatic pseudocyst, or necrotizing pancreatitis given pt with 2 recent occurrences of pancreatitis only a few days apart. previous CT reviewed, no anatomical abnormalities to predispose him   - Pain control with Tylenol PRN, IV morphine 2 mg q4hrs PRN for moderate pain, and IV morphine 4 mg q4hrs PRN for severe pain. If this is not working, I would dc morphine 4mg IV and transition to 1mg dilaudid q4h prn severe pain. can change morphine 2mg moderate pain to dilaudid 0.5mg dilaudid  - Serial abdominal exams  -GI consulted given recurrence (was supposed to f/u outpatient but unable to due to pain)

## 2024-01-04 NOTE — CONSULT NOTE ADULT - ATTENDING COMMENTS
# Recurrent acute interstitial pancreatitis: Previously with multiple potential etiologies, including EtOH, tobacco, MTX use, gallstones; however, patient now abstinent, no longer taking MTX and s/p cholecystectomy. IgG4, TG normal.   # DM  # RA    --Clear liquid diet as tolerated, will slowly advance as symptoms improve  --Pain management per primary team  --Recent CT findings with new PD dilation. Will review with advanced endoscopy team re: need for intervention in future.  --Trend liver tests  --Suggest outpatient EUS once acute inflammatory process improves to rule out alternate etiology    Additional recommendations as above. # Recurrent acute interstitial pancreatitis: Previously with multiple potential etiologies, including EtOH, tobacco, MTX use, gallstones; however, patient now abstinent, no longer taking MTX and s/p cholecystectomy. IgG4, TG normal.   # DM  # RA    --Clear liquid diet as tolerated, will slowly advance as symptoms improve  --Pain management per primary team  --Recent CT findings with new PD dilation. Will review with advanced endoscopy team re: need for intervention in future.  --Trend liver tests  --Suggest outpatient EUS once acute inflammatory process improves to rule out alternate etiology    Additional recommendations as above.    Addendum - Briefly reviewed case with advanced endoscopist, Dr. Asencio. Would defer evaluation of potential ampullary stricture at this time in setting of acute inflammation. Can readdress pending clinical course and liver tests.

## 2024-01-04 NOTE — CONSULT NOTE ADULT - ASSESSMENT
48M with history of recurrent pancreatitis, ?asthma/COPD, rheumatoid arthritis (not on any meds), type 2 DM (no SGLT2), anxiety, and alcohol use disorder (sober for last ~20 years) presented with worsening epigastric pain     Impression  #acute interstitial pancreatitis  - recurrent episodes for decades; previous triggers alcohol (sober for 20 years), methotrexate (off for 5 years), and gallstone (s/p cholecystectomy 2009); TG normal; IGG4 normal  - recently admitted for [facility] with supportive care (IVF, pain control, anti-emetics) with improvement in symptoms for about 2 days before recurrent symptoms  - lipase increasing from previous admission 700s to 1200s now  - CT 12/18 with acute interstitial pancreatitis and hepatic steatosis  - US 12/18, CBD 6mm; normal TB    #T2DM; not on SGLT2  #RA; not on any medications    Recommendations  - continue IVF; currently on 200cc/h; monitor UOP  - continue pain control and anti-emetics  - agree with CT A/P to evaluate for complications given worsening symptoms since previous discharge  - out of bed as tolerated    Note and recommendations are incomplete until reviewed and attested by attending.    Karine Allen MD  GI/Hepatology Fellow, PGY4  Teams preferred (7AM to 5PM); after 5PM, call GI fellow on call    NEW CONSULTS:  Please email [email or web address] with history of recurrent pancreatitis, ?asthma/COPD, rheumatoid arthritis (not on any meds), type 2 DM (no SGLT2), anxiety, and alcohol use disorder (sober for last ~20 years) presented with worsening epigastric pain     Impression  #acute interstitial pancreatitis  - recurrent episodes for decades; previous triggers alcohol (sober for 20 years), methotrexate (off for 5 years), and gallstone (s/p cholecystectomy 2009); TG normal; IGG4 normal  - recently admitted for [facility] with supportive care (IVF, pain control, anti-emetics) with improvement in symptoms for about 2 days before recurrent symptoms  - lipase increasing from previous admission 700s to 1200s now  - CT 12/18 with acute interstitial pancreatitis and hepatic steatosis  - US 12/18, CBD 6mm; normal TB    #T2DM; not on SGLT2  #RA; not on any medications    Recommendations  - continue IVF; currently on 200cc/h; monitor UOP  - continue pain control and anti-emetics  - agree with CT A/P to evaluate for complications given worsening symptoms since previous discharge  - out of bed as tolerated    Note and recommendations are incomplete until reviewed and attested by attending.    Karine Allen MD  GI/Hepatology Fellow, PGY4  Teams preferred (7AM to 5PM); after 5PM, call GI fellow on call    NEW CONSULTS:  Please email [email or web address] with history of recurrent pancreatitis, ?asthma/COPD, rheumatoid arthritis (not on any meds), type 2 DM (no SGLT2), anxiety, and alcohol use disorder (sober for last ~20 years) presented with worsening epigastric pain     Impression  #acute interstitial pancreatitis  - recurrent episodes for decades; previous triggers alcohol (sober for 20 years), methotrexate (off for 5 years), and gallstone (s/p cholecystectomy 2009); TG normal; IGG4 normal  - recently admitted for [facility]FH with supportive care (IVF, pain control, anti-emetics) with improvement in symptoms for about 2 days before recurrent symptoms  - lipase increasing from previous admission 700s to 1200s now  - CT 12/18 with acute interstitial pancreatitis and hepatic steatosis  - US 12/18, CBD 6mm; normal TB    #T2DM; not on SGLT2  #RA; not on any medications    Recommendations  - continue IVF; currently on 200cc/h; monitor UOP  - continue pain control and anti-emetics  - agree with CT A/P to evaluate for complications given worsening symptoms since previous discharge  - out of bed as tolerated  - EUS as outpatient after acute inflammation is resolved to further evaluation etiology of recurrent episodes    Note and recommendations are incomplete until reviewed and attested by attending.    Karine Allen MD  GI/Hepatology Fellow, PGY4  Teams preferred (7AM to 5PM); after 5PM, call GI fellow on call    NEW CONSULTS:  Please email [email or web address] 48M with history of recurrent pancreatitis, ?asthma/COPD, rheumatoid arthritis (not on any meds), type 2 DM (no SGLT2), anxiety, and alcohol use disorder (sober for last ~20 years) presented with worsening epigastric pain     Impression  #acute interstitial pancreatitis  - recurrent episodes for decades; previous triggers alcohol (sober for 20 years), methotrexate (off for 5 years), and gallstone (s/p cholecystectomy 2009); TG normal; IGG4 normal  - recently admitted for [facility]FH with supportive care (IVF, pain control, anti-emetics) with improvement in symptoms for about 2 days before recurrent symptoms  - lipase increasing from previous admission 700s to 1200s now  - CT 12/18 with acute interstitial pancreatitis and hepatic steatosis  - US 12/18, CBD 6mm; normal TB    #T2DM; not on SGLT2  #RA; not on any medications    Recommendations  - continue IVF; currently on 200cc/h; monitor UOP  - continue pain control and anti-emetics  - agree with CT A/P to evaluate for complications given worsening symptoms since previous discharge  - out of bed as tolerated  - EUS as outpatient after acute inflammation is resolved to further evaluation etiology of recurrent episodes    Note and recommendations are incomplete until reviewed and attested by attending.    Karine Allen MD  GI/Hepatology Fellow, PGY4  Teams preferred (7AM to 5PM); after 5PM, call GI fellow on call    NEW CONSULTS:  Please email [email or web address]

## 2024-01-04 NOTE — PROGRESS NOTE ADULT - PROBLEM SELECTOR PLAN 8
Pt formerly a heavy drinker, has been sober for ~20 years. No S/S of withdrawal on exam, though unclear if pt is being entirely truthful about his drinking habits. No tongue fasciculations or tremors on exam.   - Monitor for possible withdrawal S/S off CIWA for now

## 2024-01-04 NOTE — PROGRESS NOTE ADULT - PROBLEM SELECTOR PLAN 4
Pt on Metformin  mg daily at home.  - Hold Metformin while inpatient  - Start low-dose ISS and FS checks q6hrs while NPO  - Check A1c

## 2024-01-04 NOTE — CHART NOTE - NSCHARTNOTEFT_GEN_A_CORE
Reference #: [id number]  Prescription Information   PDI Filter:    PDI	Current Rx	Drug Type	Rx Written	Rx Dispensed	Drug	Quantity	Days Supply	Prescriber Name	Prescriber GERALDINE #	Payment Method	Dispenser  A	Y	O	01/02/2024	01/02/2024	tramadol hcl 50 mg tablet	28	7	Brenna Díaz MD	[id number]	Insurance	[id number]  A	N	B	11/30/2023	12/03/2023	alprazolam 2 mg tablet	30	30	Brenna Díaz MD	[id number]	Insurance	[id number]  A	N	B	10/25/2023	11/01/2023	alprazolam 2 mg tablet	30	30	Brenna Díaz MD	[id number]	Insurance	[id number]  A	N	B	09/26/2023	10/02/2023	alprazolam 2 mg tablet	30	30	Brenna Díaz MD	[id number]	Insurance	[id number]  A	N	B	08/29/2023	09/01/2023	alprazolam 2 mg tablet	30	30	Brenna Díaz MD	[id number]	Insurance	[id number]  A	N	B	07/31/2023	08/02/2023	alprazolam 2 mg tablet	30	30	Brenna Díaz MD	[id number]	Insurance	[id number]  A	N	B	07/02/2023	07/02/2023	alprazolam 2 mg tablet	30	30	Brenna Díaz MD	[id number]	Insurance	[id number]  A	N	B	06/20/2023	06/22/2023	alprazolam 1 mg tablet	30	30	TinIban shen DO	[id number]	Insurance	[id number]  A	N	B	05/12/2023	05/16/2023	alprazolam 1 mg tablet	30	30	TinIban shen DO	[id number]	Insurance	[id number]  A	N	B	04/17/2023	04/17/2023	alprazolam 1 mg tablet	30	30	TinIban shen DO	[id number]	Insurance	[id number] Reference #: [id number]  Prescription Information   PDI Filter:    PDI	Current Rx	Drug Type	Rx Written	Rx Dispensed	Drug	Quantity	Days Supply	Prescriber Name	Prescriber GERALDINE #	Payment Method	Dispenser  A	Y	O	01/02/2024	01/02/2024	tramadol hcl 50 mg tablet	28	7	Brenna Díaz MD	[id number]	Insurance	[id number]  A	N	B	11/30/2023	12/03/2023	alprazolam 2 mg tablet	30	30	Brenna Díaz MD	[id number]	Insurance	[id number]  A	N	B	10/25/2023	11/01/2023	alprazolam 2 mg tablet	30	30	Brenna Díaz MD	[id number]	Insurance	[id number]  A	N	B	09/26/2023	10/02/2023	alprazolam 2 mg tablet	30	30	Brenna Díaz MD	[id number]	Insurance	[id number]  A	N	B	08/29/2023	09/01/2023	alprazolam 2 mg tablet	30	30	Brenna Díaz MD	[id number]	Insurance	[id number]  A	N	B	07/31/2023	08/02/2023	alprazolam 2 mg tablet	30	30	Brenna Díaz MD	[id number]	Insurance	[id number]  A	N	B	07/02/2023	07/02/2023	alprazolam 2 mg tablet	30	30	Brenna Díaz MD	[id number]	Insurance	[id number]  A	N	B	06/20/2023	06/22/2023	alprazolam 1 mg tablet	30	30	TinIban shen DO	[id number]	Insurance	[id number]  A	N	B	05/12/2023	05/16/2023	alprazolam 1 mg tablet	30	30	TinIban shen DO	[id number]	Insurance	[id number]  A	N	B	04/17/2023	04/17/2023	alprazolam 1 mg tablet	30	30	TinIban shen DO	[id number]	Insurance	[id number]

## 2024-01-05 LAB
ALBUMIN SERPL ELPH-MCNC: 4.1 G/DL — SIGNIFICANT CHANGE UP (ref 3.3–5)
ALBUMIN SERPL ELPH-MCNC: 4.1 G/DL — SIGNIFICANT CHANGE UP (ref 3.3–5)
ALP SERPL-CCNC: 93 U/L — SIGNIFICANT CHANGE UP (ref 40–120)
ALP SERPL-CCNC: 93 U/L — SIGNIFICANT CHANGE UP (ref 40–120)
ALT FLD-CCNC: 103 U/L — HIGH (ref 4–41)
ALT FLD-CCNC: 103 U/L — HIGH (ref 4–41)
ANION GAP SERPL CALC-SCNC: 14 MMOL/L — SIGNIFICANT CHANGE UP (ref 7–14)
ANION GAP SERPL CALC-SCNC: 14 MMOL/L — SIGNIFICANT CHANGE UP (ref 7–14)
AST SERPL-CCNC: 54 U/L — HIGH (ref 4–40)
AST SERPL-CCNC: 54 U/L — HIGH (ref 4–40)
BILIRUB SERPL-MCNC: 0.5 MG/DL — SIGNIFICANT CHANGE UP (ref 0.2–1.2)
BILIRUB SERPL-MCNC: 0.5 MG/DL — SIGNIFICANT CHANGE UP (ref 0.2–1.2)
BUN SERPL-MCNC: 6 MG/DL — LOW (ref 7–23)
BUN SERPL-MCNC: 6 MG/DL — LOW (ref 7–23)
CALCIUM SERPL-MCNC: 8.7 MG/DL — SIGNIFICANT CHANGE UP (ref 8.4–10.5)
CALCIUM SERPL-MCNC: 8.7 MG/DL — SIGNIFICANT CHANGE UP (ref 8.4–10.5)
CHLORIDE SERPL-SCNC: 102 MMOL/L — SIGNIFICANT CHANGE UP (ref 98–107)
CHLORIDE SERPL-SCNC: 102 MMOL/L — SIGNIFICANT CHANGE UP (ref 98–107)
CO2 SERPL-SCNC: 23 MMOL/L — SIGNIFICANT CHANGE UP (ref 22–31)
CO2 SERPL-SCNC: 23 MMOL/L — SIGNIFICANT CHANGE UP (ref 22–31)
CREAT SERPL-MCNC: 0.73 MG/DL — SIGNIFICANT CHANGE UP (ref 0.5–1.3)
CREAT SERPL-MCNC: 0.73 MG/DL — SIGNIFICANT CHANGE UP (ref 0.5–1.3)
EGFR: 111 ML/MIN/1.73M2 — SIGNIFICANT CHANGE UP
EGFR: 111 ML/MIN/1.73M2 — SIGNIFICANT CHANGE UP
GLUCOSE BLDC GLUCOMTR-MCNC: 78 MG/DL — SIGNIFICANT CHANGE UP (ref 70–99)
GLUCOSE BLDC GLUCOMTR-MCNC: 78 MG/DL — SIGNIFICANT CHANGE UP (ref 70–99)
GLUCOSE BLDC GLUCOMTR-MCNC: 91 MG/DL — SIGNIFICANT CHANGE UP (ref 70–99)
GLUCOSE BLDC GLUCOMTR-MCNC: 91 MG/DL — SIGNIFICANT CHANGE UP (ref 70–99)
GLUCOSE BLDC GLUCOMTR-MCNC: 92 MG/DL — SIGNIFICANT CHANGE UP (ref 70–99)
GLUCOSE BLDC GLUCOMTR-MCNC: 92 MG/DL — SIGNIFICANT CHANGE UP (ref 70–99)
GLUCOSE BLDC GLUCOMTR-MCNC: 95 MG/DL — SIGNIFICANT CHANGE UP (ref 70–99)
GLUCOSE BLDC GLUCOMTR-MCNC: 95 MG/DL — SIGNIFICANT CHANGE UP (ref 70–99)
GLUCOSE BLDC GLUCOMTR-MCNC: 97 MG/DL — SIGNIFICANT CHANGE UP (ref 70–99)
GLUCOSE BLDC GLUCOMTR-MCNC: 97 MG/DL — SIGNIFICANT CHANGE UP (ref 70–99)
GLUCOSE SERPL-MCNC: 92 MG/DL — SIGNIFICANT CHANGE UP (ref 70–99)
GLUCOSE SERPL-MCNC: 92 MG/DL — SIGNIFICANT CHANGE UP (ref 70–99)
HCT VFR BLD CALC: 42.7 % — SIGNIFICANT CHANGE UP (ref 39–50)
HCT VFR BLD CALC: 42.7 % — SIGNIFICANT CHANGE UP (ref 39–50)
HGB BLD-MCNC: 14.4 G/DL — SIGNIFICANT CHANGE UP (ref 13–17)
HGB BLD-MCNC: 14.4 G/DL — SIGNIFICANT CHANGE UP (ref 13–17)
MAGNESIUM SERPL-MCNC: 2.1 MG/DL — SIGNIFICANT CHANGE UP (ref 1.6–2.6)
MCHC RBC-ENTMCNC: 27.4 PG — SIGNIFICANT CHANGE UP (ref 27–34)
MCHC RBC-ENTMCNC: 27.4 PG — SIGNIFICANT CHANGE UP (ref 27–34)
MCHC RBC-ENTMCNC: 33.7 GM/DL — SIGNIFICANT CHANGE UP (ref 32–36)
MCHC RBC-ENTMCNC: 33.7 GM/DL — SIGNIFICANT CHANGE UP (ref 32–36)
MCV RBC AUTO: 81.2 FL — SIGNIFICANT CHANGE UP (ref 80–100)
MCV RBC AUTO: 81.2 FL — SIGNIFICANT CHANGE UP (ref 80–100)
NRBC # BLD: 0 /100 WBCS — SIGNIFICANT CHANGE UP (ref 0–0)
NRBC # BLD: 0 /100 WBCS — SIGNIFICANT CHANGE UP (ref 0–0)
NRBC # FLD: 0 K/UL — SIGNIFICANT CHANGE UP (ref 0–0)
NRBC # FLD: 0 K/UL — SIGNIFICANT CHANGE UP (ref 0–0)
PHOSPHATE SERPL-MCNC: 2.7 MG/DL — SIGNIFICANT CHANGE UP (ref 2.5–4.5)
PHOSPHATE SERPL-MCNC: 2.7 MG/DL — SIGNIFICANT CHANGE UP (ref 2.5–4.5)
PHOSPHATE SERPL-MCNC: 2.8 MG/DL — SIGNIFICANT CHANGE UP (ref 2.5–4.5)
PHOSPHATE SERPL-MCNC: 2.8 MG/DL — SIGNIFICANT CHANGE UP (ref 2.5–4.5)
PLATELET # BLD AUTO: 219 K/UL — SIGNIFICANT CHANGE UP (ref 150–400)
PLATELET # BLD AUTO: 219 K/UL — SIGNIFICANT CHANGE UP (ref 150–400)
POTASSIUM SERPL-MCNC: 4.3 MMOL/L — SIGNIFICANT CHANGE UP (ref 3.5–5.3)
POTASSIUM SERPL-MCNC: 4.3 MMOL/L — SIGNIFICANT CHANGE UP (ref 3.5–5.3)
POTASSIUM SERPL-SCNC: 4.3 MMOL/L — SIGNIFICANT CHANGE UP (ref 3.5–5.3)
POTASSIUM SERPL-SCNC: 4.3 MMOL/L — SIGNIFICANT CHANGE UP (ref 3.5–5.3)
PROT SERPL-MCNC: 6.9 G/DL — SIGNIFICANT CHANGE UP (ref 6–8.3)
PROT SERPL-MCNC: 6.9 G/DL — SIGNIFICANT CHANGE UP (ref 6–8.3)
RBC # BLD: 5.26 M/UL — SIGNIFICANT CHANGE UP (ref 4.2–5.8)
RBC # BLD: 5.26 M/UL — SIGNIFICANT CHANGE UP (ref 4.2–5.8)
RBC # FLD: 13.1 % — SIGNIFICANT CHANGE UP (ref 10.3–14.5)
RBC # FLD: 13.1 % — SIGNIFICANT CHANGE UP (ref 10.3–14.5)
SODIUM SERPL-SCNC: 139 MMOL/L — SIGNIFICANT CHANGE UP (ref 135–145)
SODIUM SERPL-SCNC: 139 MMOL/L — SIGNIFICANT CHANGE UP (ref 135–145)
WBC # BLD: 11.38 K/UL — HIGH (ref 3.8–10.5)
WBC # BLD: 11.38 K/UL — HIGH (ref 3.8–10.5)
WBC # FLD AUTO: 11.38 K/UL — HIGH (ref 3.8–10.5)
WBC # FLD AUTO: 11.38 K/UL — HIGH (ref 3.8–10.5)

## 2024-01-05 PROCEDURE — 99233 SBSQ HOSP IP/OBS HIGH 50: CPT

## 2024-01-05 PROCEDURE — 99232 SBSQ HOSP IP/OBS MODERATE 35: CPT | Mod: GC

## 2024-01-05 RX ORDER — KETOROLAC TROMETHAMINE 30 MG/ML
15 SYRINGE (ML) INJECTION EVERY 6 HOURS
Refills: 0 | Status: DISCONTINUED | OUTPATIENT
Start: 2024-01-05 | End: 2024-01-10

## 2024-01-05 RX ORDER — HYDROMORPHONE HYDROCHLORIDE 2 MG/ML
0.5 INJECTION INTRAMUSCULAR; INTRAVENOUS; SUBCUTANEOUS ONCE
Refills: 0 | Status: DISCONTINUED | OUTPATIENT
Start: 2024-01-05 | End: 2024-01-05

## 2024-01-05 RX ORDER — ACETAMINOPHEN 500 MG
750 TABLET ORAL ONCE
Refills: 0 | Status: COMPLETED | OUTPATIENT
Start: 2024-01-05 | End: 2024-01-05

## 2024-01-05 RX ORDER — SODIUM CHLORIDE 9 MG/ML
1000 INJECTION, SOLUTION INTRAVENOUS
Refills: 0 | Status: DISCONTINUED | OUTPATIENT
Start: 2024-01-05 | End: 2024-01-06

## 2024-01-05 RX ORDER — POLYETHYLENE GLYCOL 3350 17 G/17G
17 POWDER, FOR SOLUTION ORAL DAILY
Refills: 0 | Status: DISCONTINUED | OUTPATIENT
Start: 2024-01-05 | End: 2024-01-12

## 2024-01-05 RX ORDER — HYDROMORPHONE HYDROCHLORIDE 2 MG/ML
0.5 INJECTION INTRAMUSCULAR; INTRAVENOUS; SUBCUTANEOUS
Refills: 0 | Status: DISCONTINUED | OUTPATIENT
Start: 2024-01-05 | End: 2024-01-06

## 2024-01-05 RX ORDER — SIMETHICONE 80 MG/1
80 TABLET, CHEWABLE ORAL DAILY
Refills: 0 | Status: DISCONTINUED | OUTPATIENT
Start: 2024-01-05 | End: 2024-01-12

## 2024-01-05 RX ADMIN — Medication 15 MILLIGRAM(S): at 14:31

## 2024-01-05 RX ADMIN — Medication 30 MILLILITER(S): at 06:17

## 2024-01-05 RX ADMIN — HYDROMORPHONE HYDROCHLORIDE 1 MILLIGRAM(S): 2 INJECTION INTRAMUSCULAR; INTRAVENOUS; SUBCUTANEOUS at 16:26

## 2024-01-05 RX ADMIN — SIMETHICONE 80 MILLIGRAM(S): 80 TABLET, CHEWABLE ORAL at 17:54

## 2024-01-05 RX ADMIN — POLYETHYLENE GLYCOL 3350 17 GRAM(S): 17 POWDER, FOR SOLUTION ORAL at 18:41

## 2024-01-05 RX ADMIN — Medication 30 MILLILITER(S): at 13:15

## 2024-01-05 RX ADMIN — HYDROMORPHONE HYDROCHLORIDE 1 MILLIGRAM(S): 2 INJECTION INTRAMUSCULAR; INTRAVENOUS; SUBCUTANEOUS at 21:52

## 2024-01-05 RX ADMIN — HYDROMORPHONE HYDROCHLORIDE 1 MILLIGRAM(S): 2 INJECTION INTRAMUSCULAR; INTRAVENOUS; SUBCUTANEOUS at 06:13

## 2024-01-05 RX ADMIN — ONDANSETRON 4 MILLIGRAM(S): 8 TABLET, FILM COATED ORAL at 08:30

## 2024-01-05 RX ADMIN — Medication 15 MILLIGRAM(S): at 09:30

## 2024-01-05 RX ADMIN — HYDROMORPHONE HYDROCHLORIDE 0.5 MILLIGRAM(S): 2 INJECTION INTRAMUSCULAR; INTRAVENOUS; SUBCUTANEOUS at 19:41

## 2024-01-05 RX ADMIN — Medication 750 MILLIGRAM(S): at 03:22

## 2024-01-05 RX ADMIN — HYDROMORPHONE HYDROCHLORIDE 1 MILLIGRAM(S): 2 INJECTION INTRAMUSCULAR; INTRAVENOUS; SUBCUTANEOUS at 17:26

## 2024-01-05 RX ADMIN — Medication 300 MILLIGRAM(S): at 02:54

## 2024-01-05 RX ADMIN — HYDROMORPHONE HYDROCHLORIDE 1 MILLIGRAM(S): 2 INJECTION INTRAMUSCULAR; INTRAVENOUS; SUBCUTANEOUS at 12:59

## 2024-01-05 RX ADMIN — HYDROMORPHONE HYDROCHLORIDE 0.5 MILLIGRAM(S): 2 INJECTION INTRAMUSCULAR; INTRAVENOUS; SUBCUTANEOUS at 23:31

## 2024-01-05 RX ADMIN — HYDROMORPHONE HYDROCHLORIDE 1 MILLIGRAM(S): 2 INJECTION INTRAMUSCULAR; INTRAVENOUS; SUBCUTANEOUS at 11:59

## 2024-01-05 RX ADMIN — SODIUM CHLORIDE 200 MILLILITER(S): 9 INJECTION, SOLUTION INTRAVENOUS at 09:41

## 2024-01-05 RX ADMIN — HYDROMORPHONE HYDROCHLORIDE 0.5 MILLIGRAM(S): 2 INJECTION INTRAMUSCULAR; INTRAVENOUS; SUBCUTANEOUS at 05:28

## 2024-01-05 RX ADMIN — Medication 15 MILLIGRAM(S): at 15:31

## 2024-01-05 RX ADMIN — Medication 15 MILLIGRAM(S): at 21:39

## 2024-01-05 RX ADMIN — SODIUM CHLORIDE 200 MILLILITER(S): 9 INJECTION, SOLUTION INTRAVENOUS at 20:52

## 2024-01-05 RX ADMIN — HYDROMORPHONE HYDROCHLORIDE 1 MILLIGRAM(S): 2 INJECTION INTRAMUSCULAR; INTRAVENOUS; SUBCUTANEOUS at 20:52

## 2024-01-05 RX ADMIN — HYDROMORPHONE HYDROCHLORIDE 0.5 MILLIGRAM(S): 2 INJECTION INTRAMUSCULAR; INTRAVENOUS; SUBCUTANEOUS at 04:28

## 2024-01-05 RX ADMIN — Medication 15 MILLIGRAM(S): at 08:30

## 2024-01-05 RX ADMIN — HYDROMORPHONE HYDROCHLORIDE 1 MILLIGRAM(S): 2 INJECTION INTRAMUSCULAR; INTRAVENOUS; SUBCUTANEOUS at 02:20

## 2024-01-05 RX ADMIN — HYDROMORPHONE HYDROCHLORIDE 1 MILLIGRAM(S): 2 INJECTION INTRAMUSCULAR; INTRAVENOUS; SUBCUTANEOUS at 07:13

## 2024-01-05 RX ADMIN — HYDROMORPHONE HYDROCHLORIDE 1 MILLIGRAM(S): 2 INJECTION INTRAMUSCULAR; INTRAVENOUS; SUBCUTANEOUS at 01:21

## 2024-01-05 RX ADMIN — HYDROMORPHONE HYDROCHLORIDE 0.5 MILLIGRAM(S): 2 INJECTION INTRAMUSCULAR; INTRAVENOUS; SUBCUTANEOUS at 18:41

## 2024-01-05 RX ADMIN — Medication 15 MILLIGRAM(S): at 22:39

## 2024-01-05 RX ADMIN — HYDROMORPHONE HYDROCHLORIDE 0.5 MILLIGRAM(S): 2 INJECTION INTRAMUSCULAR; INTRAVENOUS; SUBCUTANEOUS at 09:19

## 2024-01-05 RX ADMIN — HYDROMORPHONE HYDROCHLORIDE 0.5 MILLIGRAM(S): 2 INJECTION INTRAMUSCULAR; INTRAVENOUS; SUBCUTANEOUS at 10:19

## 2024-01-05 NOTE — PROGRESS NOTE ADULT - PROBLEM SELECTOR PLAN 2
WBC 12.91 on admission. Pt did not meet SIRS criteria, however. Leukocytosis likely in setting of acute pancreatitis. Pt afebrile.   - Trend CBC with diff daily  - Monitor off antibiotics for now

## 2024-01-05 NOTE — DIETITIAN INITIAL EVALUATION ADULT - ORAL INTAKE PTA/DIET HISTORY
Patient seen for assessment. Reports poor appetite since last month due to abdominal pain. Reports only consuming cereal during this time. Normally tries to follow a well balanced diet for blood glucose control. Was on Metformin PTA w/ A1c 5.7%.

## 2024-01-05 NOTE — PROGRESS NOTE ADULT - ASSESSMENT
47 yo man with history of recurrent pancreatitis, ?asthma/COPD (not on home O2), rheumatoid arthritis (not on any meds),  type 2 DM (not on insulin), anxiety, and alcohol use disorder (sober for last ~20 years) presents with worsening epigastric pain that started shortly after pt was discharged from [facility] on 12/19/23, admitted with likely acute pancreatitis. Pain syndrome and elevated lipase is consistent.  49 yo man with history of recurrent pancreatitis, ?asthma/COPD (not on home O2), rheumatoid arthritis (not on any meds),  type 2 DM (not on insulin), anxiety, and alcohol use disorder (sober for last ~20 years) presents with worsening epigastric pain that started shortly after pt was discharged from [facility] on 12/19/23, admitted with likely acute pancreatitis. Pain syndrome and elevated lipase is consistent.

## 2024-01-05 NOTE — PROGRESS NOTE ADULT - SUBJECTIVE AND OBJECTIVE BOX
Chief Complaint:  Patient is a 48y old  Male who presents with a chief complaint of Acute pancreatitis (2024 08:48)    Interval Events:  had apple juice yesterday with severe abd pain  no nausea, vomiting  no BM; no flatus    Allergies:  No Known Allergies        Hospital Medications:  acetaminophen     Tablet .. 650 milliGRAM(s) Oral every 6 hours PRN  ALPRAZolam 1 milliGRAM(s) Oral at bedtime PRN  aluminum hydroxide/magnesium hydroxide/simethicone Suspension 30 milliLiter(s) Oral every 6 hours PRN  dextrose 5%. 1000 milliLiter(s) IV Continuous <Continuous>  dextrose 5%. 1000 milliLiter(s) IV Continuous <Continuous>  dextrose 50% Injectable 25 Gram(s) IV Push once  dextrose 50% Injectable 25 Gram(s) IV Push once  dextrose 50% Injectable 12.5 Gram(s) IV Push once  dextrose Oral Gel 15 Gram(s) Oral once PRN  enoxaparin Injectable 40 milliGRAM(s) SubCutaneous every 24 hours  glucagon  Injectable 1 milliGRAM(s) IntraMuscular once  HYDROmorphone  Injectable 0.5 milliGRAM(s) IV Push every 2 hours PRN  HYDROmorphone  Injectable 1 milliGRAM(s) IV Push every 4 hours PRN  insulin lispro (ADMELOG) corrective regimen sliding scale   SubCutaneous every 6 hours  ketorolac   Injectable 15 milliGRAM(s) IV Push every 6 hours  lactated ringers. 1000 milliLiter(s) IV Continuous <Continuous>  ondansetron Injectable 4 milliGRAM(s) IV Push every 8 hours PRN  polyethylene glycol 3350 17 Gram(s) Oral daily PRN  senna 2 Tablet(s) Oral at bedtime PRN      PMHX/PSHX:  Acute Pancreatitis    Anxiety    Asthma    DM (diabetes mellitus)    Anxiety    COPD with asthma    Rheumatoid arthritis    History of alcohol use disorder    History of Cholecystectomy        Family history:  Family history of diabetes mellitus    Family history of breast cancer    FH: colon cancer    No family history of COPD    FH: COPD (chronic obstructive pulmonary disease)    FH: breast cancer        PHYSICAL EXAM:   Vital Signs:  Vital Signs Last 24 Hrs  T(C): 37.1 (2024 09:19), Max: 37.1 (2024 16:45)  T(F): 98.7 (2024 09:19), Max: 98.7 (2024 16:45)  HR: 56 (2024 09:19) (53 - 65)  BP: 158/73 (2024 09:19) (122/66 - 176/78)  BP(mean): --  RR: 18 (2024 09:19) (16 - 19)  SpO2: 96% (2024 09:19) (96% - 98%)    Parameters below as of 2024 09:19  Patient On (Oxygen Delivery Method): room air      Daily     Daily Weight in k.8 (2024 22:11)    GENERAL:  No acute distress  HEENT:  no scleral icterus  CHEST:  no accessory muscle use  HEART:  Regular rate and rhythm  ABDOMEN:  Soft, upper abdominal tenderness, non-distended  EXTREMITIES:  No edema  SKIN:  No rash/ecchymoses  NEURO:  Alert and oriented x 3    LABS:                        15.1   10.06 )-----------( 226      ( 2024 06:50 )             45.5       01-04    139  |  103  |  7   ----------------------------<  93  4.4   |  25  |  0.87    Ca    9.4      2024 06:50  Phos  2.8     01-04  Mg     2.10     01-04    TPro  7.3  /  Alb  4.4  /  TBili  0.6  /  DBili  x   /  AST  45<H>  /  ALT  57<H>  /  AlkPhos  72  01-04    LIVER FUNCTIONS - ( 2024 06:50 )  Alb: 4.4 g/dL / Pro: 7.3 g/dL / ALK PHOS: 72 U/L / ALT: 57 U/L / AST: 45 U/L / GGT: x           PT/INR - ( 2024 06:50 )   PT: 11.9 sec;   INR: 1.05 ratio         PTT - ( 2024 06:50 )  PTT:31.4 sec  Urinalysis Basic - ( 2024 06:50 )    Color: x / Appearance: x / SG: x / pH: x  Gluc: 93 mg/dL / Ketone: x  / Bili: x / Urobili: x   Blood: x / Protein: x / Nitrite: x   Leuk Esterase: x / RBC: x / WBC x   Sq Epi: x / Non Sq Epi: x / Bacteria: x                              15.1   10.06 )-----------( 226      ( 2024 06:50 )             45.5                         16.0   12.91 )-----------( 262      ( 2024 17:27 )             47.9 Interval Events:  had apple juice yesterday with severe abd pain  no nausea, vomiting  no BM; no flatus    Allergies:  No Known Allergies        Hospital Medications:  acetaminophen     Tablet .. 650 milliGRAM(s) Oral every 6 hours PRN  ALPRAZolam 1 milliGRAM(s) Oral at bedtime PRN  aluminum hydroxide/magnesium hydroxide/simethicone Suspension 30 milliLiter(s) Oral every 6 hours PRN  dextrose 5%. 1000 milliLiter(s) IV Continuous <Continuous>  dextrose 5%. 1000 milliLiter(s) IV Continuous <Continuous>  dextrose 50% Injectable 25 Gram(s) IV Push once  dextrose 50% Injectable 25 Gram(s) IV Push once  dextrose 50% Injectable 12.5 Gram(s) IV Push once  dextrose Oral Gel 15 Gram(s) Oral once PRN  enoxaparin Injectable 40 milliGRAM(s) SubCutaneous every 24 hours  glucagon  Injectable 1 milliGRAM(s) IntraMuscular once  HYDROmorphone  Injectable 0.5 milliGRAM(s) IV Push every 2 hours PRN  HYDROmorphone  Injectable 1 milliGRAM(s) IV Push every 4 hours PRN  insulin lispro (ADMELOG) corrective regimen sliding scale   SubCutaneous every 6 hours  ketorolac   Injectable 15 milliGRAM(s) IV Push every 6 hours  lactated ringers. 1000 milliLiter(s) IV Continuous <Continuous>  ondansetron Injectable 4 milliGRAM(s) IV Push every 8 hours PRN  polyethylene glycol 3350 17 Gram(s) Oral daily PRN  senna 2 Tablet(s) Oral at bedtime PRN      PMHX/PSHX:  Acute Pancreatitis    Anxiety    Asthma    DM (diabetes mellitus)    Anxiety    COPD with asthma    Rheumatoid arthritis    History of alcohol use disorder    History of Cholecystectomy        Family history:  Family history of diabetes mellitus    Family history of breast cancer    FH: colon cancer    No family history of COPD    FH: COPD (chronic obstructive pulmonary disease)    FH: breast cancer        PHYSICAL EXAM:   Vital Signs:  Vital Signs Last 24 Hrs  T(C): 37.1 (2024 09:19), Max: 37.1 (2024 16:45)  T(F): 98.7 (2024 09:19), Max: 98.7 (2024 16:45)  HR: 56 (2024 09:19) (53 - 65)  BP: 158/73 (2024 09:19) (122/66 - 176/78)  BP(mean): --  RR: 18 (2024 09:19) (16 - 19)  SpO2: 96% (2024 09:19) (96% - 98%)    Parameters below as of 2024 09:19  Patient On (Oxygen Delivery Method): room air      Daily     Daily Weight in k.8 (2024 22:11)    GENERAL:  No acute distress  HEENT:  no scleral icterus  CHEST:  no accessory muscle use  HEART:  Regular rate and rhythm  ABDOMEN:  Soft, upper abdominal tenderness, non-distended  EXTREMITIES:  No edema  SKIN:  No rash/ecchymoses  NEURO:  Alert and oriented x 3    LABS:                        15.1   10.06 )-----------( 226      ( 2024 06:50 )             45.5       01-04    139  |  103  |  7   ----------------------------<  93  4.4   |  25  |  0.87    Ca    9.4      2024 06:50  Phos  2.8     01-04  Mg     2.10     01-04    TPro  7.3  /  Alb  4.4  /  TBili  0.6  /  DBili  x   /  AST  45<H>  /  ALT  57<H>  /  AlkPhos  72  01-04    LIVER FUNCTIONS - ( 2024 06:50 )  Alb: 4.4 g/dL / Pro: 7.3 g/dL / ALK PHOS: 72 U/L / ALT: 57 U/L / AST: 45 U/L / GGT: x           PT/INR - ( 2024 06:50 )   PT: 11.9 sec;   INR: 1.05 ratio         PTT - ( 2024 06:50 )  PTT:31.4 sec  Urinalysis Basic - ( 2024 06:50 )    Color: x / Appearance: x / SG: x / pH: x  Gluc: 93 mg/dL / Ketone: x  / Bili: x / Urobili: x   Blood: x / Protein: x / Nitrite: x   Leuk Esterase: x / RBC: x / WBC x   Sq Epi: x / Non Sq Epi: x / Bacteria: x                              15.1   10.06 )-----------( 226      ( 2024 06:50 )             45.5                         16.0   12.91 )-----------( 262      ( 2024 17:27 )             47.9

## 2024-01-05 NOTE — DIETITIAN INITIAL EVALUATION ADULT - ADD RECOMMEND
When medically feasible rec advance diet to low fat w/ ensure max BID (300 kcal, 60 g pro), defer consistencies to team. Addition of multivitamin for micronutrient coverage. Monitor PO intake.

## 2024-01-05 NOTE — DIETITIAN INITIAL EVALUATION ADULT - NSICDXPASTMEDICALHX_GEN_ALL_CORE_FT
PAST MEDICAL HISTORY:  Acute Pancreatitis     Anxiety     COPD with asthma     DM (diabetes mellitus)     History of alcohol use disorder     Rheumatoid arthritis

## 2024-01-05 NOTE — PROGRESS NOTE ADULT - PROBLEM SELECTOR PLAN 1
Pt presenting with worsening epigastric pain x2 weeks, associated with nausea and poor PO intake. Lipase elevated to 1282. Most likely acute pancreatitis, though unclear etiology. Pt denies any current alcohol use. TG 76 on admission. IgG4 in Dec 2023 wnl. Possibly from nicotine vaping? unclear why Pt regressed/relapsed. Previous CT scan reviewed,  repeat CT does note mildly worsening pancreatitis with narrowing of ampulla, unclear from inflammation vs stricture     - S/p 2L bolus of NS in ED. C/w IVF with LR at 200 cc/hr x12 hrs for now, reassess thereafter.  - make NPO with sips of water again today  -repeat CT does note mildly worsening pancreatitis with narrowing of ampulla, unclear from inflammation vs stricture   -will change pain regimen to dilaudid 1mg q4h prn, dilaudid 0.5mg q2h prn, atc toradol and prn tylenol. Pt is not requiring a lot of total dilaudid but is requiring frequent dosing. may need pca pump  - Serial abdominal exams  -appreciate GI input  -f/u repeat labs

## 2024-01-05 NOTE — PROGRESS NOTE ADULT - ASSESSMENT
48M with history of recurrent pancreatitis, ?asthma/COPD, rheumatoid arthritis (not on any meds), type 2 DM (no SGLT2), anxiety, and alcohol use disorder (sober for last ~20 years) presented with worsening epigastric pain     Impression  #acute interstitial pancreatitis  - recurrent episodes for decades; previous triggers alcohol (sober for 20 years), methotrexate (off for 5 years), and gallstone (s/p cholecystectomy 2009); TG normal; IGG4 normal  - recently admitted for [facility] with supportive care (IVF, pain control, anti-emetics) with improvement in symptoms for about 2 days before recurrent symptoms  - lipase increasing from previous admission 700s to 1200s now  - CT 1/4 with mild interval worsening of acute pancreatitis with new pancreatic ductal dilatation to level of the ampulla. Ductal dilatation could be due to an ampullary stricture or severe inflammation of the periampullary duodenal wall; discussed with advanced GI team; suspect findings 2/2 acute inflammation 2/2 pancreatitis; suspect findings will improve with treatment of underlying pancreatitis  - US 12/18, CBD 6mm; normal TB    #T2DM; not on SGLT2  #RA; not on any medications    Recommendations  - make NPO today  - miralax if able to tolerate; otherwise, can give enema  - out of bed and ambulating as tolerating  - continue IVF; currently on 200cc/h; monitor UOP  - continue pain control and anti-emetics  - out of bed as tolerated  - EUS as outpatient after acute inflammation is resolved to further evaluation etiology of recurrent episodes    Note and recommendations are incomplete until reviewed and attested by attending.    Karine Allen MD  GI/Hepatology Fellow, PGY4  Teams preferred (7AM to 5PM); after 5PM, call GI fellow on call    NEW CONSULTS:  Please email [email or web address] with history of recurrent pancreatitis, ?asthma/COPD, rheumatoid arthritis (not on any meds), type 2 DM (no SGLT2), anxiety, and alcohol use disorder (sober for last ~20 years) presented with worsening epigastric pain     Impression  #acute interstitial pancreatitis  - recurrent episodes for decades; previous triggers alcohol (sober for 20 years), methotrexate (off for 5 years), and gallstone (s/p cholecystectomy 2009); TG normal; IGG4 normal  - recently admitted for [facility] with supportive care (IVF, pain control, anti-emetics) with improvement in symptoms for about 2 days before recurrent symptoms  - lipase increasing from previous admission 700s to 1200s now  - CT 1/4 with mild interval worsening of acute pancreatitis with new pancreatic ductal dilatation to level of the ampulla. Ductal dilatation could be due to an ampullary stricture or severe inflammation of the periampullary duodenal wall; discussed with advanced GI team; suspect findings 2/2 acute inflammation 2/2 pancreatitis; suspect findings will improve with treatment of underlying pancreatitis  - US 12/18, CBD 6mm; normal TB    #T2DM; not on SGLT2  #RA; not on any medications    Recommendations  - make NPO today  - miralax if able to tolerate; otherwise, can give enema  - out of bed and ambulating as tolerating  - continue IVF; currently on 200cc/h; monitor UOP  - continue pain control and anti-emetics  - out of bed as tolerated  - EUS as outpatient after acute inflammation is resolved to further evaluation etiology of recurrent episodes    Note and recommendations are incomplete until reviewed and attested by attending.    Karine Allen MD  GI/Hepatology Fellow, PGY4  Teams preferred (7AM to 5PM); after 5PM, call GI fellow on call    NEW CONSULTS:  Please email [email or web address] with history of recurrent pancreatitis, ?asthma/COPD, rheumatoid arthritis (not on any meds), type 2 DM (no SGLT2), anxiety, and alcohol use disorder (sober for last ~20 years) presented with worsening epigastric pain     Impression  #acute interstitial pancreatitis  - recurrent episodes for decades; previous triggers alcohol (sober for 20 years), methotrexate (off for 5 years), and gallstone (s/p cholecystectomy 2009); TG normal; IGG4 normal  - recently admitted for [facility] with supportive care (IVF, pain control, anti-emetics) with improvement in symptoms for about 2 days before recurrent symptoms  - lipase increasing from previous admission 700s to 1200s now  - CT 1/4 with mild interval worsening of acute pancreatitis with new pancreatic ductal dilatation to level of the ampulla. Ductal dilatation could be due to an ampullary stricture or severe inflammation of the periampullary duodenal wall; discussed with advanced GI team; suspect findings 2/2 acute inflammation 2/2 pancreatitis; suspect findings will improve with treatment of underlying pancreatitis  - US 12/18, CBD 6mm; normal TB    #T2DM; not on SGLT2  #RA; not on any medications    Recommendations  - make NPO today given ongoing pain with apple juice, OK for water as tolerated  - miralax if able to tolerate; otherwise, can give enema  - out of bed and ambulating as tolerating  - continue IVF; currently on 200cc/h; monitor UOP  - continue pain control and anti-emetics  - out of bed as tolerated  - EUS as outpatient after acute inflammation is resolved to further evaluation etiology of recurrent episodes    Note and recommendations are incomplete until reviewed and attested by attending.    Karine Allen MD  GI/Hepatology Fellow, PGY4  Teams preferred (7AM to 5PM); after 5PM, call GI fellow on call    NEW CONSULTS:  Please email [email or web address] with history of recurrent pancreatitis, ?asthma/COPD, rheumatoid arthritis (not on any meds), type 2 DM (no SGLT2), anxiety, and alcohol use disorder (sober for last ~20 years) presented with worsening epigastric pain     Impression  #acute interstitial pancreatitis  - recurrent episodes for decades; previous triggers alcohol (sober for 20 years), methotrexate (off for 5 years), and gallstone (s/p cholecystectomy 2009); TG normal; IGG4 normal  - recently admitted for [facility] with supportive care (IVF, pain control, anti-emetics) with improvement in symptoms for about 2 days before recurrent symptoms  - lipase increasing from previous admission 700s to 1200s now  - CT 1/4 with mild interval worsening of acute pancreatitis with new pancreatic ductal dilatation to level of the ampulla. Ductal dilatation could be due to an ampullary stricture or severe inflammation of the periampullary duodenal wall; discussed with advanced GI team; suspect findings 2/2 acute inflammation 2/2 pancreatitis; suspect findings will improve with treatment of underlying pancreatitis  - US 12/18, CBD 6mm; normal TB    #T2DM; not on SGLT2  #RA; not on any medications    Recommendations  - make NPO today given ongoing pain with apple juice, OK for water as tolerated  - miralax if able to tolerate; otherwise, can give enema  - out of bed and ambulating as tolerating  - continue IVF; currently on 200cc/h; monitor UOP  - continue pain control and anti-emetics  - out of bed as tolerated  - EUS as outpatient after acute inflammation is resolved to further evaluation etiology of recurrent episodes    Note and recommendations are incomplete until reviewed and attested by attending.    Karine Allen MD  GI/Hepatology Fellow, PGY4  Teams preferred (7AM to 5PM); after 5PM, call GI fellow on call    NEW CONSULTS:  Please email [email or web address]

## 2024-01-05 NOTE — PROGRESS NOTE ADULT - PROBLEM SELECTOR PLAN 3
Pt with marked sinus dorian to 47 bpm on admission EKG. Pt HD stable and asymptomatic from bradycardia. Likely near pt's baseline resting HR, as pt noted to have HR in low 60s at [place].   - No AV blocks noted on admission EKG  - WNL  - Monitor VS q4hrs Pt with marked sinus dorian to 47 bpm on admission EKG. Pt HD stable and asymptomatic from bradycardia. Likely near pt's baseline resting HR, as pt noted to have HR in low 60s at [place].   - No AV blocks noted on admission EKG  - WNL  - Monitor VS q4hrs

## 2024-01-05 NOTE — PROGRESS NOTE ADULT - PROBLEM SELECTOR PLAN 8
Pt formerly a heavy drinker, has been sober for ~20 years. No S/S of withdrawal on exam,  No tongue fasciculations or tremors on exam.   - Monitor for possible withdrawal S/S off CIWA for now

## 2024-01-05 NOTE — DIETITIAN INITIAL EVALUATION ADULT - PERTINENT LABORATORY DATA
01-04    139  |  103  |  7   ----------------------------<  93  4.4   |  25  |  0.87    Ca    9.4      04 Jan 2024 06:50  Phos  2.8     01-04  Mg     2.10     01-04    TPro  7.3  /  Alb  4.4  /  TBili  0.6  /  DBili  x   /  AST  45<H>  /  ALT  57<H>  /  AlkPhos  72  01-04  POCT Blood Glucose.: 92 mg/dL (01-05-24 @ 06:06)  A1C with Estimated Average Glucose Result: 5.7 % (01-04-24 @ 06:50)

## 2024-01-05 NOTE — DIETITIAN INITIAL EVALUATION ADULT - PERTINENT MEDS FT
MEDICATIONS  (STANDING):  dextrose 5%. 1000 milliLiter(s) (50 mL/Hr) IV Continuous <Continuous>  dextrose 5%. 1000 milliLiter(s) (100 mL/Hr) IV Continuous <Continuous>  dextrose 50% Injectable 25 Gram(s) IV Push once  dextrose 50% Injectable 25 Gram(s) IV Push once  dextrose 50% Injectable 12.5 Gram(s) IV Push once  enoxaparin Injectable 40 milliGRAM(s) SubCutaneous every 24 hours  glucagon  Injectable 1 milliGRAM(s) IntraMuscular once  insulin lispro (ADMELOG) corrective regimen sliding scale   SubCutaneous every 6 hours  ketorolac   Injectable 15 milliGRAM(s) IV Push every 6 hours  lactated ringers. 1000 milliLiter(s) (200 mL/Hr) IV Continuous <Continuous>    MEDICATIONS  (PRN):  acetaminophen     Tablet .. 650 milliGRAM(s) Oral every 6 hours PRN Temp greater or equal to 38C (100.4F), Mild Pain (1 - 3), Moderate Pain (4 - 6), Severe Pain (7 - 10)  ALPRAZolam 1 milliGRAM(s) Oral at bedtime PRN for anxiety  aluminum hydroxide/magnesium hydroxide/simethicone Suspension 30 milliLiter(s) Oral every 6 hours PRN Dyspepsia  dextrose Oral Gel 15 Gram(s) Oral once PRN Blood Glucose LESS THAN 70 milliGRAM(s)/deciliter  HYDROmorphone  Injectable 0.5 milliGRAM(s) IV Push every 2 hours PRN Moderate Pain (4 - 6)  HYDROmorphone  Injectable 1 milliGRAM(s) IV Push every 4 hours PRN Severe Pain (7 - 10)  ondansetron Injectable 4 milliGRAM(s) IV Push every 8 hours PRN Nausea and/or Vomiting  polyethylene glycol 3350 17 Gram(s) Oral daily PRN Constipation  senna 2 Tablet(s) Oral at bedtime PRN Constipation

## 2024-01-05 NOTE — DIETITIAN INITIAL EVALUATION ADULT - OTHER INFO
48 year old male with a PMH of recurrent pancreatitis, ?asthma/COPD, type 2 DM, anxiety, and alcohol use disorder (sober for last ~20 years) presents with worsening epigastric pain that started shortly after pt was discharged from [facility] on 12/19/23, admitted with likely acute pancreatitis per chart.    Patient now NPO due to severe abdominal pain per chart. No N/V reported. Has no food allergies. Patient reports losing weight PTA since reoccurrence of symptoms. Per HIE, noted w/ weight 99.8 kg (12/3). ABW is 90.8 kg (1/4) per chart indicating a -9.1% weight loss x 1 month, significant. No edema or pressure injuries noted per RN flow sheet.    Reviewed low fat diet w/ patient and provided low fat nutrition therapy handout. 48 year old male with a PMH of recurrent pancreatitis, ?asthma/COPD, type 2 DM, anxiety, and alcohol use disorder (sober for last ~20 years) presents with worsening epigastric pain that started shortly after pt was discharged from [facility] on 12/19/23, admitted with likely acute pancreatitis per chart.    Patient now NPO due to severe abdominal pain per chart. No N/V reported. Has no food allergies. Patient reports losing weight PTA since reoccurrence of symptoms. Per HIE, noted w/ weight 99.8 kg (12/3). ABW is 90.8 kg (1/4) per chart indicating a -9.1% weight loss x 1 month, significant. No edema or pressure injuries noted per RN flow sheet.    Reviewed low fat diet w/ patient and provided low fat nutrition therapy handout.

## 2024-01-05 NOTE — DIETITIAN INITIAL EVALUATION ADULT - PROBLEM SELECTOR PLAN 3
Pt with marked sinus dorian to 47 bpm on admission EKG. Pt HD stable and asymptomatic from bradycardia. Likely near pt's baseline resting HR, as pt noted to have HR in low 60s at [place].   - No AV blocks noted on admission EKG  - Check TSH  - Monitor VS q4hrs Pt with marked sinus dorian to 47 bpm on admission EKG. Pt HD stable and asymptomatic from bradycardia. Likely near pt's baseline resting HR, as pt noted to have HR in low 60s at [place].   - No AV blocks noted on admission EKG  - Check TSH  - Monitor VS q4hrs

## 2024-01-05 NOTE — PROGRESS NOTE ADULT - SUBJECTIVE AND OBJECTIVE BOX
Tammy Orellana MD   [facility] Medicine  Teams preferred  Pager: [phone]    Patient is a 48y old  Male who presents with a chief complaint of Acute pancreatitis (04 Jan 2024 14:17)      INTERVAL HPI/OVERNIGHT EVENTS: Pt reports pain in the evening after receiving a combination of meds, had improved. then around 1-2am, took some apple juice and had worsening abdominal pain and nausea. now receiving toradol. tells me that the 0.5mg dose of dilaudid more effective than the 1mg dose of dilaudid. pain is epigastric, radiating to back    MEDICATIONS  (STANDING):  dextrose 5%. 1000 milliLiter(s) (50 mL/Hr) IV Continuous <Continuous>  dextrose 5%. 1000 milliLiter(s) (100 mL/Hr) IV Continuous <Continuous>  dextrose 50% Injectable 25 Gram(s) IV Push once  dextrose 50% Injectable 25 Gram(s) IV Push once  dextrose 50% Injectable 12.5 Gram(s) IV Push once  enoxaparin Injectable 40 milliGRAM(s) SubCutaneous every 24 hours  glucagon  Injectable 1 milliGRAM(s) IntraMuscular once  HYDROmorphone  Injectable 0.5 milliGRAM(s) IV Push once  insulin lispro (ADMELOG) corrective regimen sliding scale   SubCutaneous every 6 hours  ketorolac   Injectable 15 milliGRAM(s) IV Push every 6 hours  lactated ringers. 1000 milliLiter(s) (200 mL/Hr) IV Continuous <Continuous>    MEDICATIONS  (PRN):  acetaminophen     Tablet .. 650 milliGRAM(s) Oral every 6 hours PRN Temp greater or equal to 38C (100.4F), Mild Pain (1 - 3), Moderate Pain (4 - 6), Severe Pain (7 - 10)  ALPRAZolam 1 milliGRAM(s) Oral at bedtime PRN for anxiety  aluminum hydroxide/magnesium hydroxide/simethicone Suspension 30 milliLiter(s) Oral every 6 hours PRN Dyspepsia  dextrose Oral Gel 15 Gram(s) Oral once PRN Blood Glucose LESS THAN 70 milliGRAM(s)/deciliter  HYDROmorphone  Injectable 1 milliGRAM(s) IV Push every 4 hours PRN Severe Pain (7 - 10)  HYDROmorphone  Injectable 0.5 milliGRAM(s) IV Push every 2 hours PRN Moderate Pain (4 - 6)  ondansetron Injectable 4 milliGRAM(s) IV Push every 8 hours PRN Nausea and/or Vomiting  polyethylene glycol 3350 17 Gram(s) Oral daily PRN Constipation  senna 2 Tablet(s) Oral at bedtime PRN Constipation      Allergies    No Known Allergies    Intolerances        REVIEW OF SYSTEMS:  Please see interval HPI:    Vital Signs Last 24 Hrs  T(C): 36.8 (05 Jan 2024 06:10), Max: 37.1 (04 Jan 2024 16:45)  T(F): 98.2 (05 Jan 2024 06:10), Max: 98.7 (04 Jan 2024 16:45)  HR: 61 (05 Jan 2024 06:10) (53 - 65)  BP: 128/76 (05 Jan 2024 06:10) (116/75 - 176/78)  BP(mean): --  RR: 19 (05 Jan 2024 06:10) (16 - 19)  SpO2: 97% (05 Jan 2024 06:10) (95% - 98%)    Parameters below as of 05 Jan 2024 06:10  Patient On (Oxygen Delivery Method): room air      I&O's Detail        PHYSICAL EXAM:  Vital Signs Last 24 Hrs  T(C): 36.8 (05 Jan 2024 06:10), Max: 37.1 (04 Jan 2024 16:45)  T(F): 98.2 (05 Jan 2024 06:10), Max: 98.7 (04 Jan 2024 16:45)  HR: 61 (05 Jan 2024 06:10) (53 - 65)  BP: 128/76 (05 Jan 2024 06:10) (116/75 - 176/78)  BP(mean): --  RR: 19 (05 Jan 2024 06:10) (16 - 19)  SpO2: 97% (05 Jan 2024 06:10) (95% - 98%)    Parameters below as of 05 Jan 2024 06:10  Patient On (Oxygen Delivery Method): room air      CONSTITUTIONAL: NAD,   ENMT: Moist oral mucosa,   RESPIRATORY: Normal respiratory effort; lungs are clear to auscultation bilaterally  CARDIOVASCULAR: Regular rate and rhythm, normal S1 and S2, no murmur/rub/gallop; No lower extremity edema;   ABDOMEN: mostly epigastric tenderness, normoactive bowel sounds, no rebound/guarding; No hepatosplenomegaly  EXT: no edema b/l  NEUROLOGY: alert, following commands  SKIN: No rashes; no palpable lesions      LABS:      Ca    9.4        04 Jan 2024 06:50      PT/INR - ( 04 Jan 2024 06:50 )   PT: 11.9 sec;   INR: 1.05 ratio         PTT - ( 04 Jan 2024 06:50 )  PTT:31.4 sec  CAPILLARY BLOOD GLUCOSE      POCT Blood Glucose.: 92 mg/dL (05 Jan 2024 06:06)  POCT Blood Glucose.: 78 mg/dL (05 Jan 2024 00:38)  POCT Blood Glucose.: 100 mg/dL (04 Jan 2024 17:52)  POCT Blood Glucose.: 89 mg/dL (04 Jan 2024 12:04)    BLOOD CULTURE    RADIOLOGY & ADDITIONAL TESTS:  CT abd/pelv:  IMPRESSION:  Mild interval worsening of acute pancreatitis with new pancreatic ductal   dilatation to level of the ampulla.  Ductal dilatation could be due to an ampullary stricture or severe   inflammation of the periampullary duodenal wall.  Imaging Personally Reviewed:  [ ] YES     Consultant(s) Notes Reviewed:      Care Discussed with Consultants/Other Providers: Tammy Orellana MD   [facility] Medicine  Teams preferred  Pager: [phone]    Patient is a 48y old  Male who presents with a chief complaint of Acute pancreatitis (04 Jan 2024 14:17)      INTERVAL HPI/OVERNIGHT EVENTS: Pt reports pain in the evening after receiving a combination of meds, had improved. then around 1-2am, took some apple juice and had worsening abdominal pain and nausea. now receiving toradol. tells me that the 0.5mg dose of dilaudid more effective than the 1mg dose of dilaudid. pain is epigastric, radiating to back    MEDICATIONS  (STANDING):  dextrose 5%. 1000 milliLiter(s) (50 mL/Hr) IV Continuous <Continuous>  dextrose 5%. 1000 milliLiter(s) (100 mL/Hr) IV Continuous <Continuous>  dextrose 50% Injectable 25 Gram(s) IV Push once  dextrose 50% Injectable 25 Gram(s) IV Push once  dextrose 50% Injectable 12.5 Gram(s) IV Push once  enoxaparin Injectable 40 milliGRAM(s) SubCutaneous every 24 hours  glucagon  Injectable 1 milliGRAM(s) IntraMuscular once  HYDROmorphone  Injectable 0.5 milliGRAM(s) IV Push once  insulin lispro (ADMELOG) corrective regimen sliding scale   SubCutaneous every 6 hours  ketorolac   Injectable 15 milliGRAM(s) IV Push every 6 hours  lactated ringers. 1000 milliLiter(s) (200 mL/Hr) IV Continuous <Continuous>    MEDICATIONS  (PRN):  acetaminophen     Tablet .. 650 milliGRAM(s) Oral every 6 hours PRN Temp greater or equal to 38C (100.4F), Mild Pain (1 - 3), Moderate Pain (4 - 6), Severe Pain (7 - 10)  ALPRAZolam 1 milliGRAM(s) Oral at bedtime PRN for anxiety  aluminum hydroxide/magnesium hydroxide/simethicone Suspension 30 milliLiter(s) Oral every 6 hours PRN Dyspepsia  dextrose Oral Gel 15 Gram(s) Oral once PRN Blood Glucose LESS THAN 70 milliGRAM(s)/deciliter  HYDROmorphone  Injectable 1 milliGRAM(s) IV Push every 4 hours PRN Severe Pain (7 - 10)  HYDROmorphone  Injectable 0.5 milliGRAM(s) IV Push every 2 hours PRN Moderate Pain (4 - 6)  ondansetron Injectable 4 milliGRAM(s) IV Push every 8 hours PRN Nausea and/or Vomiting  polyethylene glycol 3350 17 Gram(s) Oral daily PRN Constipation  senna 2 Tablet(s) Oral at bedtime PRN Constipation      Allergies    No Known Allergies    Intolerances        REVIEW OF SYSTEMS:  Please see interval HPI:    Vital Signs Last 24 Hrs  T(C): 36.8 (05 Jan 2024 06:10), Max: 37.1 (04 Jan 2024 16:45)  T(F): 98.2 (05 Jan 2024 06:10), Max: 98.7 (04 Jan 2024 16:45)  HR: 61 (05 Jan 2024 06:10) (53 - 65)  BP: 128/76 (05 Jan 2024 06:10) (116/75 - 176/78)  BP(mean): --  RR: 19 (05 Jan 2024 06:10) (16 - 19)  SpO2: 97% (05 Jan 2024 06:10) (95% - 98%)    Parameters below as of 05 Jan 2024 06:10  Patient On (Oxygen Delivery Method): room air      I&O's Detail        PHYSICAL EXAM:  Vital Signs Last 24 Hrs  T(C): 36.8 (05 Jan 2024 06:10), Max: 37.1 (04 Jan 2024 16:45)  T(F): 98.2 (05 Jan 2024 06:10), Max: 98.7 (04 Jan 2024 16:45)  HR: 61 (05 Jan 2024 06:10) (53 - 65)  BP: 128/76 (05 Jan 2024 06:10) (116/75 - 176/78)  BP(mean): --  RR: 19 (05 Jan 2024 06:10) (16 - 19)  SpO2: 97% (05 Jan 2024 06:10) (95% - 98%)    Parameters below as of 05 Jan 2024 06:10  Patient On (Oxygen Delivery Method): room air      CONSTITUTIONAL: NAD,   ENMT: Moist oral mucosa,   RESPIRATORY: Normal respiratory effort; lungs are clear to auscultation bilaterally  CARDIOVASCULAR: Regular rate and rhythm, normal S1 and S2, no murmur/rub/gallop; No lower extremity edema;   ABDOMEN: mostly epigastric tenderness, normoactive bowel sounds, no rebound/guarding; No hepatosplenomegaly  EXT: no edema b/l  NEUROLOGY: alert, following commands  SKIN: No rashes; no palpable lesions      LABS:      Ca    9.4        04 Jan 2024 06:50      PT/INR - ( 04 Jan 2024 06:50 )   PT: 11.9 sec;   INR: 1.05 ratio         PTT - ( 04 Jan 2024 06:50 )  PTT:31.4 sec  CAPILLARY BLOOD GLUCOSE      POCT Blood Glucose.: 92 mg/dL (05 Jan 2024 06:06)  POCT Blood Glucose.: 78 mg/dL (05 Jan 2024 00:38)  POCT Blood Glucose.: 100 mg/dL (04 Jan 2024 17:52)  POCT Blood Glucose.: 89 mg/dL (04 Jan 2024 12:04)    BLOOD CULTURE    RADIOLOGY & ADDITIONAL TESTS:  CT abd/pelv:  IMPRESSION:  Mild interval worsening of acute pancreatitis with new pancreatic ductal   dilatation to level of the ampulla.  Ductal dilatation could be due to an ampullary stricture or severe   inflammation of the periampullary duodenal wall.  Imaging Personally Reviewed:  [ ] YES     Consultant(s) Notes Reviewed:      Care Discussed with Consultants/Other Providers:

## 2024-01-05 NOTE — DIETITIAN INITIAL EVALUATION ADULT - PROBLEM SELECTOR PROBLEM 7
HERMELINDA ambulatory encounter  FAMILY PRACTICE video VISIT    CHIEF COMPLAINT:    Chief Complaint   Patient presents with   • Follow-up       SUBJECTIVE:  Raquel Jacobson is a 63 year old female who presented requesting evaluation for fibromyalgia, diabetes.    Patient has uncontrolled type 2 diabetes and was referred to endocrinology.  She saw endocrinology on 05/14/2021.  Most recent hemoglobin A1 c 9.4%.  Current doses of Lantus were continued 43 units in the morning and 40 units in the evening.  NovoLog 25-28 units with correction with meals.  Patient states fasting blood glucose is about 100-120 and glucose is increases around dinnertime to over 200 consistently.     Patient does have osteoporosis on recent DEXA scan.  Fosamax was recommended but she did not started and does not plan to per endocrinology note.    Patient has a history of fibromyalgia has been on chronic prednisone.  The patient has not had any relief of her pain with any other medications.  We have increased dose of prednisone over the past 2 years.  We are doing this for quality of life I did explain the significant side effects    Patient reports on 40 mg of prednisone daily she has significant reduction of pain.  She is also taking gabapentin 100 mg in the morning and afternoon and 300 mg at bedtime.  Pain has been well controlled.  She has been able to do more at home.    Review of systems:   All other systems are reviewed and are negative except as documented in the history of present illness.     OBJECTIVE:  PROBLEM LIST:   Patient Active Problem List   Diagnosis   • Osteoporosis, unspecified   • Obesity, unspecified   • MVP (mitral valve prolapse)   • Migraine   • Lactose intolerance   • Irritable bowel syndrome   • Hypothyroidism (acquired)   • Hemorrhoids   • Fibromyalgia   • DM (diabetes mellitus) (CMS/[facility])   • Anxiety and depression   • CFS (chronic fatigue syndrome)   • Asthma   • Arthritis   • Hyperlipoproteinemia   •  Hypertension   • Anemia   • B12 deficiency   • Body mass index (bmi) 60.0-69.9, adult   • Neoplasm of uncertain behavior of breast   • Iron deficiency   • History of recurrent miscarriages, not currently pregnant   • Primary localized osteoarthrosis, hand   • Thyroid nodule   • Other specified intestinal malabsorption   • A-fib (CMS/HCC)   • Lumbar disc disease with radiculopathy, L4-L5 and L5-S1   • Chronic midline low back pain with left-sided sciatica   • MORRO on CPAP   • On prednisone therapy   • Polymyalgia (CMS/HCC)   • Refused influenza vaccine   • Refused pneumococcal vaccine   • Benign essential tremor   • Hypokalemia       PAST HISTORIES:   I have reviewed the past medical history, family history, social history, medications and allergies listed in the medical record as obtained by my nursing staff and support staff and agree with their documentation.    PHYSICAL EXAM:   Vital Signs:    Visit Vitals  BP (!) 161/89   Pulse 91   Temp 98.1 °F (36.7 °C)   Wt (!) 168.3 kg   SpO2 96%   BMI 65.72 kg/m²     Pulse Ox Interpretation:  Pulse ox not performed  General:   Alert, cooperative, conversive in no acute distress..  Neurologic:  Oriented x4.  No focal deficits.  Psychiatric:  Cooperative.  Appropriate mood and affect.    Exam limited due to this being a video visit    LAB RESULTS:   All pertinent laboratory results were reviewed.    ASSESSMENT:   1. Fibromyalgia    2. CFS (chronic fatigue syndrome)    3. MORRO on CPAP    4. On prednisone therapy    5. Age related osteoporosis, unspecified pathological fracture presence    6. Type 2 diabetes mellitus without complication, with long-term current use of insulin (CMS/HCC)      Fibromyalgia:  Establish problem, improved.  Continue gabapentin at 100 mg in the morning, 100 mg in the afternoon and 300 mg at bedtime.  Continue prednisone 40 mg daily.  Again I did discuss with patient that we are only doing chronic use of prednisone for quality of life.  I do not expect  COPD with asthma her to improve regarding the fibromyalgia or chronic fatigue syndrome.  I did explain the risk of side effects and adverse effects from chronic steroid use including worsening diabetes, heart disease, endocrine issues, musculoskeletal issues such as osteonecrosis.  Hip x-ray is pending.    Diabetes:  Established problem, uncontrolled.  Increasing Lantus to 45 units in the morning and 40 units at nighttime.  Continue current dose of NovoLog.  Follow-up with endocrinology.    Osteoporosis:  Established problem, stable.  Awaiting Lexiscan results.  Follow-up with endocrinology.    PLAN:   Orders Placed This Encounter   • insulin glargine 100 UNIT/ML pen-injector           Return in about 4 weeks (around 7/7/2021) for fibromyalgia .    Instructions provided as documented in the after visit summary.    The patient indicated understanding of the diagnosis and agreed with the plan of care.       Ian Ellsworth, DO

## 2024-01-05 NOTE — PROGRESS NOTE ADULT - PROBLEM SELECTOR PLAN 4
Pt on Metformin  mg daily at home.  - Hold Metformin while inpatient  - Start low-dose ISS and FS checks q6hrs while NPO  - a1c 5.7, will do FS for now but if bothersome can dc

## 2024-01-06 LAB
ALBUMIN SERPL ELPH-MCNC: 3.8 G/DL — SIGNIFICANT CHANGE UP (ref 3.3–5)
ALBUMIN SERPL ELPH-MCNC: 3.8 G/DL — SIGNIFICANT CHANGE UP (ref 3.3–5)
ALP SERPL-CCNC: 93 U/L — SIGNIFICANT CHANGE UP (ref 40–120)
ALP SERPL-CCNC: 93 U/L — SIGNIFICANT CHANGE UP (ref 40–120)
ALT FLD-CCNC: 83 U/L — HIGH (ref 4–41)
ALT FLD-CCNC: 83 U/L — HIGH (ref 4–41)
ANION GAP SERPL CALC-SCNC: 13 MMOL/L — SIGNIFICANT CHANGE UP (ref 7–14)
ANION GAP SERPL CALC-SCNC: 13 MMOL/L — SIGNIFICANT CHANGE UP (ref 7–14)
AST SERPL-CCNC: 42 U/L — HIGH (ref 4–40)
AST SERPL-CCNC: 42 U/L — HIGH (ref 4–40)
BILIRUB SERPL-MCNC: 0.5 MG/DL — SIGNIFICANT CHANGE UP (ref 0.2–1.2)
BILIRUB SERPL-MCNC: 0.5 MG/DL — SIGNIFICANT CHANGE UP (ref 0.2–1.2)
BUN SERPL-MCNC: 6 MG/DL — LOW (ref 7–23)
BUN SERPL-MCNC: 6 MG/DL — LOW (ref 7–23)
CALCIUM SERPL-MCNC: 8.4 MG/DL — SIGNIFICANT CHANGE UP (ref 8.4–10.5)
CALCIUM SERPL-MCNC: 8.4 MG/DL — SIGNIFICANT CHANGE UP (ref 8.4–10.5)
CHLORIDE SERPL-SCNC: 101 MMOL/L — SIGNIFICANT CHANGE UP (ref 98–107)
CHLORIDE SERPL-SCNC: 101 MMOL/L — SIGNIFICANT CHANGE UP (ref 98–107)
CO2 SERPL-SCNC: 25 MMOL/L — SIGNIFICANT CHANGE UP (ref 22–31)
CO2 SERPL-SCNC: 25 MMOL/L — SIGNIFICANT CHANGE UP (ref 22–31)
CREAT SERPL-MCNC: 0.72 MG/DL — SIGNIFICANT CHANGE UP (ref 0.5–1.3)
CREAT SERPL-MCNC: 0.72 MG/DL — SIGNIFICANT CHANGE UP (ref 0.5–1.3)
EGFR: 111 ML/MIN/1.73M2 — SIGNIFICANT CHANGE UP
EGFR: 111 ML/MIN/1.73M2 — SIGNIFICANT CHANGE UP
GLUCOSE BLDC GLUCOMTR-MCNC: 103 MG/DL — HIGH (ref 70–99)
GLUCOSE BLDC GLUCOMTR-MCNC: 103 MG/DL — HIGH (ref 70–99)
GLUCOSE BLDC GLUCOMTR-MCNC: 86 MG/DL — SIGNIFICANT CHANGE UP (ref 70–99)
GLUCOSE BLDC GLUCOMTR-MCNC: 86 MG/DL — SIGNIFICANT CHANGE UP (ref 70–99)
GLUCOSE BLDC GLUCOMTR-MCNC: 88 MG/DL — SIGNIFICANT CHANGE UP (ref 70–99)
GLUCOSE BLDC GLUCOMTR-MCNC: 88 MG/DL — SIGNIFICANT CHANGE UP (ref 70–99)
GLUCOSE BLDC GLUCOMTR-MCNC: 89 MG/DL — SIGNIFICANT CHANGE UP (ref 70–99)
GLUCOSE BLDC GLUCOMTR-MCNC: 97 MG/DL — SIGNIFICANT CHANGE UP (ref 70–99)
GLUCOSE BLDC GLUCOMTR-MCNC: 97 MG/DL — SIGNIFICANT CHANGE UP (ref 70–99)
GLUCOSE SERPL-MCNC: 83 MG/DL — SIGNIFICANT CHANGE UP (ref 70–99)
GLUCOSE SERPL-MCNC: 83 MG/DL — SIGNIFICANT CHANGE UP (ref 70–99)
HCT VFR BLD CALC: 42 % — SIGNIFICANT CHANGE UP (ref 39–50)
HCT VFR BLD CALC: 42 % — SIGNIFICANT CHANGE UP (ref 39–50)
HGB BLD-MCNC: 14 G/DL — SIGNIFICANT CHANGE UP (ref 13–17)
HGB BLD-MCNC: 14 G/DL — SIGNIFICANT CHANGE UP (ref 13–17)
MAGNESIUM SERPL-MCNC: 2 MG/DL — SIGNIFICANT CHANGE UP (ref 1.6–2.6)
MAGNESIUM SERPL-MCNC: 2 MG/DL — SIGNIFICANT CHANGE UP (ref 1.6–2.6)
MCHC RBC-ENTMCNC: 27.6 PG — SIGNIFICANT CHANGE UP (ref 27–34)
MCHC RBC-ENTMCNC: 27.6 PG — SIGNIFICANT CHANGE UP (ref 27–34)
MCHC RBC-ENTMCNC: 33.3 GM/DL — SIGNIFICANT CHANGE UP (ref 32–36)
MCHC RBC-ENTMCNC: 33.3 GM/DL — SIGNIFICANT CHANGE UP (ref 32–36)
MCV RBC AUTO: 82.7 FL — SIGNIFICANT CHANGE UP (ref 80–100)
MCV RBC AUTO: 82.7 FL — SIGNIFICANT CHANGE UP (ref 80–100)
NRBC # BLD: 0 /100 WBCS — SIGNIFICANT CHANGE UP (ref 0–0)
NRBC # BLD: 0 /100 WBCS — SIGNIFICANT CHANGE UP (ref 0–0)
NRBC # FLD: 0 K/UL — SIGNIFICANT CHANGE UP (ref 0–0)
NRBC # FLD: 0 K/UL — SIGNIFICANT CHANGE UP (ref 0–0)
PHOSPHATE SERPL-MCNC: 2.3 MG/DL — LOW (ref 2.5–4.5)
PHOSPHATE SERPL-MCNC: 2.3 MG/DL — LOW (ref 2.5–4.5)
PLATELET # BLD AUTO: 201 K/UL — SIGNIFICANT CHANGE UP (ref 150–400)
PLATELET # BLD AUTO: 201 K/UL — SIGNIFICANT CHANGE UP (ref 150–400)
POTASSIUM SERPL-MCNC: 3.8 MMOL/L — SIGNIFICANT CHANGE UP (ref 3.5–5.3)
POTASSIUM SERPL-MCNC: 3.8 MMOL/L — SIGNIFICANT CHANGE UP (ref 3.5–5.3)
POTASSIUM SERPL-SCNC: 3.8 MMOL/L — SIGNIFICANT CHANGE UP (ref 3.5–5.3)
POTASSIUM SERPL-SCNC: 3.8 MMOL/L — SIGNIFICANT CHANGE UP (ref 3.5–5.3)
PROT SERPL-MCNC: 6.5 G/DL — SIGNIFICANT CHANGE UP (ref 6–8.3)
PROT SERPL-MCNC: 6.5 G/DL — SIGNIFICANT CHANGE UP (ref 6–8.3)
RBC # BLD: 5.08 M/UL — SIGNIFICANT CHANGE UP (ref 4.2–5.8)
RBC # BLD: 5.08 M/UL — SIGNIFICANT CHANGE UP (ref 4.2–5.8)
RBC # FLD: 12.9 % — SIGNIFICANT CHANGE UP (ref 10.3–14.5)
RBC # FLD: 12.9 % — SIGNIFICANT CHANGE UP (ref 10.3–14.5)
SODIUM SERPL-SCNC: 139 MMOL/L — SIGNIFICANT CHANGE UP (ref 135–145)
SODIUM SERPL-SCNC: 139 MMOL/L — SIGNIFICANT CHANGE UP (ref 135–145)
WBC # BLD: 11.98 K/UL — HIGH (ref 3.8–10.5)
WBC # BLD: 11.98 K/UL — HIGH (ref 3.8–10.5)
WBC # FLD AUTO: 11.98 K/UL — HIGH (ref 3.8–10.5)
WBC # FLD AUTO: 11.98 K/UL — HIGH (ref 3.8–10.5)

## 2024-01-06 PROCEDURE — 99233 SBSQ HOSP IP/OBS HIGH 50: CPT

## 2024-01-06 PROCEDURE — 99232 SBSQ HOSP IP/OBS MODERATE 35: CPT | Mod: GC

## 2024-01-06 RX ORDER — SODIUM CHLORIDE 9 MG/ML
1000 INJECTION, SOLUTION INTRAVENOUS
Refills: 0 | Status: DISCONTINUED | OUTPATIENT
Start: 2024-01-06 | End: 2024-01-08

## 2024-01-06 RX ORDER — CHLORHEXIDINE GLUCONATE 213 G/1000ML
1 SOLUTION TOPICAL
Refills: 0 | Status: DISCONTINUED | OUTPATIENT
Start: 2024-01-06 | End: 2024-01-12

## 2024-01-06 RX ORDER — FAMOTIDINE 10 MG/ML
20 INJECTION INTRAVENOUS EVERY 12 HOURS
Refills: 0 | Status: DISCONTINUED | OUTPATIENT
Start: 2024-01-06 | End: 2024-01-12

## 2024-01-06 RX ORDER — HYDROMORPHONE HYDROCHLORIDE 2 MG/ML
1 INJECTION INTRAMUSCULAR; INTRAVENOUS; SUBCUTANEOUS
Refills: 0 | Status: DISCONTINUED | OUTPATIENT
Start: 2024-01-06 | End: 2024-01-06

## 2024-01-06 RX ORDER — HYDROMORPHONE HYDROCHLORIDE 2 MG/ML
0.5 INJECTION INTRAMUSCULAR; INTRAVENOUS; SUBCUTANEOUS ONCE
Refills: 0 | Status: DISCONTINUED | OUTPATIENT
Start: 2024-01-06 | End: 2024-01-06

## 2024-01-06 RX ORDER — HYDROMORPHONE HYDROCHLORIDE 2 MG/ML
1 INJECTION INTRAMUSCULAR; INTRAVENOUS; SUBCUTANEOUS
Refills: 0 | Status: DISCONTINUED | OUTPATIENT
Start: 2024-01-06 | End: 2024-01-07

## 2024-01-06 RX ORDER — FAMOTIDINE 10 MG/ML
20 INJECTION INTRAVENOUS
Refills: 0 | Status: DISCONTINUED | OUTPATIENT
Start: 2024-01-06 | End: 2024-01-06

## 2024-01-06 RX ADMIN — HYDROMORPHONE HYDROCHLORIDE 1 MILLIGRAM(S): 2 INJECTION INTRAMUSCULAR; INTRAVENOUS; SUBCUTANEOUS at 21:35

## 2024-01-06 RX ADMIN — Medication 650 MILLIGRAM(S): at 11:36

## 2024-01-06 RX ADMIN — HYDROMORPHONE HYDROCHLORIDE 1 MILLIGRAM(S): 2 INJECTION INTRAMUSCULAR; INTRAVENOUS; SUBCUTANEOUS at 08:29

## 2024-01-06 RX ADMIN — HYDROMORPHONE HYDROCHLORIDE 0.5 MILLIGRAM(S): 2 INJECTION INTRAMUSCULAR; INTRAVENOUS; SUBCUTANEOUS at 00:31

## 2024-01-06 RX ADMIN — HYDROMORPHONE HYDROCHLORIDE 0.5 MILLIGRAM(S): 2 INJECTION INTRAMUSCULAR; INTRAVENOUS; SUBCUTANEOUS at 05:25

## 2024-01-06 RX ADMIN — SODIUM CHLORIDE 150 MILLILITER(S): 9 INJECTION, SOLUTION INTRAVENOUS at 18:08

## 2024-01-06 RX ADMIN — Medication 15 MILLIGRAM(S): at 02:25

## 2024-01-06 RX ADMIN — HYDROMORPHONE HYDROCHLORIDE 1 MILLIGRAM(S): 2 INJECTION INTRAMUSCULAR; INTRAVENOUS; SUBCUTANEOUS at 01:25

## 2024-01-06 RX ADMIN — HYDROMORPHONE HYDROCHLORIDE 1 MILLIGRAM(S): 2 INJECTION INTRAMUSCULAR; INTRAVENOUS; SUBCUTANEOUS at 06:26

## 2024-01-06 RX ADMIN — HYDROMORPHONE HYDROCHLORIDE 1 MILLIGRAM(S): 2 INJECTION INTRAMUSCULAR; INTRAVENOUS; SUBCUTANEOUS at 05:26

## 2024-01-06 RX ADMIN — HYDROMORPHONE HYDROCHLORIDE 1 MILLIGRAM(S): 2 INJECTION INTRAMUSCULAR; INTRAVENOUS; SUBCUTANEOUS at 08:44

## 2024-01-06 RX ADMIN — SIMETHICONE 80 MILLIGRAM(S): 80 TABLET, CHEWABLE ORAL at 01:31

## 2024-01-06 RX ADMIN — Medication 15 MILLIGRAM(S): at 01:25

## 2024-01-06 RX ADMIN — FAMOTIDINE 20 MILLIGRAM(S): 10 INJECTION INTRAVENOUS at 18:01

## 2024-01-06 RX ADMIN — Medication 650 MILLIGRAM(S): at 10:36

## 2024-01-06 RX ADMIN — SODIUM CHLORIDE 200 MILLILITER(S): 9 INJECTION, SOLUTION INTRAVENOUS at 07:36

## 2024-01-06 RX ADMIN — ONDANSETRON 4 MILLIGRAM(S): 8 TABLET, FILM COATED ORAL at 17:51

## 2024-01-06 RX ADMIN — Medication 10 MILLIGRAM(S): at 10:37

## 2024-01-06 RX ADMIN — Medication 15 MILLIGRAM(S): at 20:25

## 2024-01-06 RX ADMIN — HYDROMORPHONE HYDROCHLORIDE 1 MILLIGRAM(S): 2 INJECTION INTRAMUSCULAR; INTRAVENOUS; SUBCUTANEOUS at 21:20

## 2024-01-06 RX ADMIN — Medication 15 MILLIGRAM(S): at 14:04

## 2024-01-06 RX ADMIN — HYDROMORPHONE HYDROCHLORIDE 1 MILLIGRAM(S): 2 INJECTION INTRAMUSCULAR; INTRAVENOUS; SUBCUTANEOUS at 02:25

## 2024-01-06 RX ADMIN — HYDROMORPHONE HYDROCHLORIDE 1 MILLIGRAM(S): 2 INJECTION INTRAMUSCULAR; INTRAVENOUS; SUBCUTANEOUS at 14:55

## 2024-01-06 RX ADMIN — HYDROMORPHONE HYDROCHLORIDE 1 MILLIGRAM(S): 2 INJECTION INTRAMUSCULAR; INTRAVENOUS; SUBCUTANEOUS at 17:50

## 2024-01-06 RX ADMIN — HYDROMORPHONE HYDROCHLORIDE 0.5 MILLIGRAM(S): 2 INJECTION INTRAMUSCULAR; INTRAVENOUS; SUBCUTANEOUS at 04:25

## 2024-01-06 RX ADMIN — Medication 15 MILLIGRAM(S): at 07:10

## 2024-01-06 RX ADMIN — HYDROMORPHONE HYDROCHLORIDE 1 MILLIGRAM(S): 2 INJECTION INTRAMUSCULAR; INTRAVENOUS; SUBCUTANEOUS at 11:50

## 2024-01-06 RX ADMIN — HYDROMORPHONE HYDROCHLORIDE 1 MILLIGRAM(S): 2 INJECTION INTRAMUSCULAR; INTRAVENOUS; SUBCUTANEOUS at 14:40

## 2024-01-06 RX ADMIN — HYDROMORPHONE HYDROCHLORIDE 1 MILLIGRAM(S): 2 INJECTION INTRAMUSCULAR; INTRAVENOUS; SUBCUTANEOUS at 11:35

## 2024-01-06 RX ADMIN — HYDROMORPHONE HYDROCHLORIDE 1 MILLIGRAM(S): 2 INJECTION INTRAMUSCULAR; INTRAVENOUS; SUBCUTANEOUS at 18:05

## 2024-01-06 RX ADMIN — Medication 15 MILLIGRAM(S): at 08:10

## 2024-01-06 RX ADMIN — Medication 15 MILLIGRAM(S): at 14:19

## 2024-01-06 RX ADMIN — Medication 15 MILLIGRAM(S): at 20:17

## 2024-01-06 RX ADMIN — Medication 1 ENEMA: at 23:03

## 2024-01-06 NOTE — PROGRESS NOTE ADULT - PROBLEM SELECTOR PLAN 1
Pt presenting with worsening epigastric pain x2 weeks, associated with nausea and poor PO intake. Lipase elevated to 1282. Most likely acute pancreatitis, though unclear etiology. Pt denies any current alcohol use. TG 76 on admission. IgG4 in Dec 2023 wnl. Possibly from nicotine vaping? unclear why Pt regressed/relapsed. Previous CT scan reviewed,  repeat CT does note mildly worsening pancreatitis with narrowing of ampulla, unclear from inflammation vs stricture     - S/p 2L bolus of NS in ED. C/w IVF with LR at 200 cc/hr x12 hrs for now, reassess thereafter.  - c/w NPO and sips of water  -repeat CT does note mildly worsening pancreatitis with narrowing of ampulla, unclear from inflammation vs stricture   -will change pain regimen to dilaudid 1mg q43h prn,, atc toradol and prn tylenol. If needs breakthrough can give additional 0.5mg.   -I do feel like a component of his pain is also constipation and possible gastritis  -encourage bowel regimen  -pepcid BID IV  Pt is not requiring a lot of total dilaudid but is requiring frequent dosing. may need pca pump  - Serial abdominal exams  -appreciate GI input  -f/u repeat labs-LFTs stable

## 2024-01-06 NOTE — PROGRESS NOTE ADULT - NUTRITIONAL ASSESSMENT
This patient has been assessed with a concern for Malnutrition and has been determined to have a diagnosis/diagnoses of Severe protein-calorie malnutrition.    This patient is being managed with:   Diet NPO-  Except Medications  With Ice Chips/Sips of Water  Entered: Jan 5 2024  8:56AM

## 2024-01-06 NOTE — PROGRESS NOTE ADULT - ASSESSMENT
48M with history of recurrent pancreatitis, ?asthma/COPD, rheumatoid arthritis (not on any meds), type 2 DM (no SGLT2), anxiety, and alcohol use disorder (sober for last ~20 years) presented with worsening epigastric pain     Impression  #acute interstitial pancreatitis  - recurrent episodes for decades; previous triggers alcohol (sober for 20 years), methotrexate (off for 5 years), and gallstone (s/p cholecystectomy 2009); TG normal; IGG4 normal  - recently admitted for LIJFH with supportive care (IVF, pain control, anti-emetics) with improvement in symptoms for about 2 days before recurrent symptoms  - lipase increasing from previous admission 700s to 1200s now  - CT 1/4 with mild interval worsening of acute pancreatitis with new pancreatic ductal dilatation to level of the ampulla. Ductal dilatation could be due to an ampullary stricture or severe inflammation of the periampullary duodenal wall; discussed with advanced GI team; suspect findings 2/2 acute inflammation 2/2 pancreatitis; suspect findings will improve with treatment of underlying pancreatitis  - US 12/18, CBD 6mm; normal TB    #T2DM; not on SGLT2  #RA; not on any medications    Recommendations  - LFTs slightly uptrending; ALP/TB normal; continue to trend CMP  - continue NPO today; OK for water as tolerated  - continue miralax if able to tolerate  - out of bed and ambulating as tolerating  - continue IVF; currently on 200cc/h; monitor UOP  - continue pain control and anti-emetics  - out of bed as tolerated  - EUS as outpatient after acute inflammation is resolved to further evaluation etiology of recurrent episodes    Note and recommendations are incomplete until reviewed and attested by attending.    Karine Allen MD  GI/Hepatology Fellow, PGY4  Teams preferred (7AM to 5PM); after 5PM, call GI fellow on call    NEW CONSULTS:  Please email [email or web address] with history of recurrent pancreatitis, ?asthma/COPD, rheumatoid arthritis (not on any meds), type 2 DM (no SGLT2), anxiety, and alcohol use disorder (sober for last ~20 years) presented with worsening epigastric pain     Impression  #acute interstitial pancreatitis  - recurrent episodes for decades; previous triggers alcohol (sober for 20 years), methotrexate (off for 5 years), and gallstone (s/p cholecystectomy 2009); TG normal; IGG4 normal  - recently admitted for LIJFH with supportive care (IVF, pain control, anti-emetics) with improvement in symptoms for about 2 days before recurrent symptoms  - lipase increasing from previous admission 700s to 1200s now  - CT 1/4 with mild interval worsening of acute pancreatitis with new pancreatic ductal dilatation to level of the ampulla. Ductal dilatation could be due to an ampullary stricture or severe inflammation of the periampullary duodenal wall; discussed with advanced GI team; suspect findings 2/2 acute inflammation 2/2 pancreatitis; suspect findings will improve with treatment of underlying pancreatitis  - US 12/18, CBD 6mm; normal TB    #T2DM; not on SGLT2  #RA; not on any medications    Recommendations  - LFTs slightly uptrending; ALP/TB normal; continue to trend CMP  - continue NPO today; OK for water as tolerated  - continue miralax if able to tolerate  - out of bed and ambulating as tolerating  - continue IVF; currently on 200cc/h; monitor UOP  - continue pain control and anti-emetics  - out of bed as tolerated  - EUS as outpatient after acute inflammation is resolved to further evaluation etiology of recurrent episodes    Note and recommendations are incomplete until reviewed and attested by attending.    Karine Allen MD  GI/Hepatology Fellow, PGY4  Teams preferred (7AM to 5PM); after 5PM, call GI fellow on call    NEW CONSULTS:  Please email [email or web address]

## 2024-01-06 NOTE — PROGRESS NOTE ADULT - PROBLEM SELECTOR PLAN 3
Pt with marked sinus dorian to 47 bpm on admission EKG. Pt HD stable and asymptomatic from bradycardia. Likely near pt's baseline resting HR, as pt noted to have HR in low 60s at [place].   - No AV blocks noted on admission EKG  - WNL  - Monitor VS q4hrs Pt with marked sinus dorian to 47 bpm on admission EKG. Pt HD stable and asymptomatic from bradycardia. Likely near pt's baseline resting HR, as pt noted to have HR in low 60s at [place].   - No AV blocks noted on admission EKG  - WNL  - Monitor VS q4hrs

## 2024-01-06 NOTE — PROGRESS NOTE ADULT - PROBLEM SELECTOR PROBLEM 5
If you have labs or imaging done, the results will automatically release in [facility] without an interpretation.  Your health care professional will review those results and send an interpretation with recommendations as soon as possible, but this may be 1-3 business days.    If you have any questions regarding your visit, please contact your care team.     [facility]: [phone]  [facility] CLINIC HOURS TELEPHONE NUMBER       Gina PortilloDO Matta - BASIM Barron-MICHAEL Hernandez-MICHAEL Rush-MICHAEL Caicedo-  Jess-     Monday- [place]  8:00 a.m - 5:00 p.m    Tuesday- [place]  8:00 a.m - 5:00 p.m    Wednesday- OFF    Thursday- Surgery     Friday- [place]  8:00 a.m - 5:00 p.m. [facility]  [address]  [place] [address]  [phone] Fax  Imaging [phone]    [facility] Labor [facility] Delivery  [address] [place] [address] [place] [address]  [phone]    [facility]  [address] [place] [address]  [phone] Fax  Imaging [phone]     Urgent Care locations:    [facility] Monday-Friday  10 am - 8 pm  Saturday and Sunday   9 am - 5 pm  Monday-Friday   10 am - 8 pm  Saturday and Sunday   9 am - 5 pm   [phone]     If you need a medication refill, please contact your pharmacy. Please allow 3 business days for your refill to be completed.    As always, thank you for trusting us with your healthcare needs!     Anxiety

## 2024-01-06 NOTE — PROGRESS NOTE ADULT - SUBJECTIVE AND OBJECTIVE BOX
Chief Complaint:  Patient is a 48y old  Male who presents with a chief complaint of Acute pancreatitis without infection or necrosis    (05 Jan 2024 11:33)    Interval Events:   ongoing abdominal pain; no nausea  passing flatus; no BM yet  ambulating    Allergies:  No Known Allergies        Hospital Medications:  acetaminophen     Tablet .. 650 milliGRAM(s) Oral every 6 hours PRN  ALPRAZolam 1 milliGRAM(s) Oral at bedtime PRN  aluminum hydroxide/magnesium hydroxide/simethicone Suspension 30 milliLiter(s) Oral every 6 hours PRN  dextrose 5%. 1000 milliLiter(s) IV Continuous <Continuous>  dextrose 5%. 1000 milliLiter(s) IV Continuous <Continuous>  dextrose 50% Injectable 25 Gram(s) IV Push once  dextrose 50% Injectable 25 Gram(s) IV Push once  dextrose 50% Injectable 12.5 Gram(s) IV Push once  dextrose Oral Gel 15 Gram(s) Oral once PRN  enoxaparin Injectable 40 milliGRAM(s) SubCutaneous every 24 hours  glucagon  Injectable 1 milliGRAM(s) IntraMuscular once  HYDROmorphone  Injectable 1 milliGRAM(s) IV Push every 3 hours PRN  insulin lispro (ADMELOG) corrective regimen sliding scale   SubCutaneous every 6 hours  ketorolac   Injectable 15 milliGRAM(s) IV Push every 6 hours  lactated ringers. 1000 milliLiter(s) IV Continuous <Continuous>  ondansetron Injectable 4 milliGRAM(s) IV Push every 8 hours PRN  polyethylene glycol 3350 17 Gram(s) Oral daily  saline laxative (FLEET) Rectal Enema 1 Enema Rectal daily PRN  senna 2 Tablet(s) Oral at bedtime PRN  simethicone 80 milliGRAM(s) Chew daily PRN      PMHX/PSHX:  Acute Pancreatitis    Anxiety    Asthma    DM (diabetes mellitus)    Anxiety    COPD with asthma    Rheumatoid arthritis    History of alcohol use disorder    History of Cholecystectomy        Family history:  Family history of diabetes mellitus    Family history of breast cancer    FH: colon cancer    No family history of COPD    FH: COPD (chronic obstructive pulmonary disease)    FH: breast cancer        PHYSICAL EXAM:   Vital Signs:  Vital Signs Last 24 Hrs  T(C): 36.8 (06 Jan 2024 11:35), Max: 37.1 (05 Jan 2024 13:05)  T(F): 98.2 (06 Jan 2024 11:35), Max: 98.8 (05 Jan 2024 13:05)  HR: 54 (06 Jan 2024 11:35) (54 - 68)  BP: 155/80 (06 Jan 2024 11:35) (142/78 - 204/95)  BP(mean): --  RR: 18 (06 Jan 2024 11:35) (17 - 22)  SpO2: 97% (06 Jan 2024 08:29) (97% - 99%)    Parameters below as of 06 Jan 2024 11:35  Patient On (Oxygen Delivery Method): room air      Daily     Daily     GENERAL:  No acute distress  HEENT:  no scleral icterus  CHEST:  no accessory muscle use  HEART:  Regular rate and rhythm  ABDOMEN:  Soft, upper abdominal tenderness, non-distended  EXTREMITIES:  No edema  SKIN:  No rash/ecchymoses  NEURO:  Alert and oriented x 3    LABS:                        14.0   11.98 )-----------( 201      ( 06 Jan 2024 05:40 )             42.0     Mean Cell Volume: 82.7 fL (01-06-24 @ 05:40)    01-06    139  |  101  |  6<L>  ----------------------------<  83  3.8   |  25  |  0.72    Ca    8.4      06 Jan 2024 05:40  Phos  2.3     01-06  Mg     2.00     01-06    TPro  6.5  /  Alb  3.8  /  TBili  0.5  /  DBili  x   /  AST  42<H>  /  ALT  83<H>  /  AlkPhos  93  01-06    LIVER FUNCTIONS - ( 06 Jan 2024 05:40 )  Alb: 3.8 g/dL / Pro: 6.5 g/dL / ALK PHOS: 93 U/L / ALT: 83 U/L / AST: 42 U/L / GGT: x             Urinalysis Basic - ( 06 Jan 2024 05:40 )    Color: x / Appearance: x / SG: x / pH: x  Gluc: 83 mg/dL / Ketone: x  / Bili: x / Urobili: x   Blood: x / Protein: x / Nitrite: x   Leuk Esterase: x / RBC: x / WBC x   Sq Epi: x / Non Sq Epi: x / Bacteria: x                              14.0   11.98 )-----------( 201      ( 06 Jan 2024 05:40 )             42.0                         14.4   11.38 )-----------( 219      ( 05 Jan 2024 14:29 )             42.7                         15.1   10.06 )-----------( 226      ( 04 Jan 2024 06:50 )             45.5                         16.0   12.91 )-----------( 262      ( 03 Jan 2024 17:27 )             47.9

## 2024-01-06 NOTE — PROGRESS NOTE ADULT - SUBJECTIVE AND OBJECTIVE BOX
Tammy Orellana MD   [facility] Medicine  Teams preferred  Pager: [phone]    Patient is a 48y old  Male who presents with a chief complaint of Acute pancreatitis (06 Jan 2024 12:21)      INTERVAL HPI/OVERNIGHT EVENTS: overnight pain was worse again. Pt reports he has two types of pain-epigastric radiating to the back, as well as burning pain and diffuse lower quadrant pain. 1mg dilaudid now is more effective. tylenol also helpful and toradol    MEDICATIONS  (STANDING):  dextrose 5%. 1000 milliLiter(s) (50 mL/Hr) IV Continuous <Continuous>  dextrose 5%. 1000 milliLiter(s) (100 mL/Hr) IV Continuous <Continuous>  dextrose 50% Injectable 25 Gram(s) IV Push once  dextrose 50% Injectable 25 Gram(s) IV Push once  dextrose 50% Injectable 12.5 Gram(s) IV Push once  enoxaparin Injectable 40 milliGRAM(s) SubCutaneous every 24 hours  famotidine  IVPB 20 milliGRAM(s) IV Intermittent two times a day  glucagon  Injectable 1 milliGRAM(s) IntraMuscular once  insulin lispro (ADMELOG) corrective regimen sliding scale   SubCutaneous every 6 hours  ketorolac   Injectable 15 milliGRAM(s) IV Push every 6 hours  lactated ringers. 1000 milliLiter(s) (200 mL/Hr) IV Continuous <Continuous>  polyethylene glycol 3350 17 Gram(s) Oral daily    MEDICATIONS  (PRN):  acetaminophen     Tablet .. 650 milliGRAM(s) Oral every 6 hours PRN Temp greater or equal to 38C (100.4F), Mild Pain (1 - 3), Moderate Pain (4 - 6), Severe Pain (7 - 10)  ALPRAZolam 1 milliGRAM(s) Oral at bedtime PRN for anxiety  aluminum hydroxide/magnesium hydroxide/simethicone Suspension 30 milliLiter(s) Oral every 6 hours PRN Dyspepsia  dextrose Oral Gel 15 Gram(s) Oral once PRN Blood Glucose LESS THAN 70 milliGRAM(s)/deciliter  HYDROmorphone  Injectable 1 milliGRAM(s) IV Push every 3 hours PRN Moderate and Severe pain  ondansetron Injectable 4 milliGRAM(s) IV Push every 8 hours PRN Nausea and/or Vomiting  saline laxative (FLEET) Rectal Enema 1 Enema Rectal daily PRN constipation  senna 2 Tablet(s) Oral at bedtime PRN Constipation  simethicone 80 milliGRAM(s) Chew daily PRN Gas      Allergies    No Known Allergies    Intolerances        REVIEW OF SYSTEMS:  Please see interval HPI:    Vital Signs Last 24 Hrs  T(C): 36.7 (06 Jan 2024 14:00), Max: 36.8 (06 Jan 2024 11:35)  T(F): 98 (06 Jan 2024 14:00), Max: 98.2 (06 Jan 2024 11:35)  HR: 62 (06 Jan 2024 14:00) (54 - 68)  BP: 149/77 (06 Jan 2024 14:00) (142/78 - 204/95)  BP(mean): --  RR: 18 (06 Jan 2024 14:00) (17 - 22)  SpO2: 100% (06 Jan 2024 14:00) (97% - 100%)    Parameters below as of 06 Jan 2024 14:00  Patient On (Oxygen Delivery Method): room air      I&O's Detail    06 Jan 2024 07:01  -  06 Jan 2024 17:04  --------------------------------------------------------  IN:    Lactated Ringers: 2400 mL  Total IN: 2400 mL    OUT:    Voided (mL): 600 mL  Total OUT: 600 mL    Total NET: 1800 mL            PHYSICAL EXAM:  Vital Signs Last 24 Hrs  T(C): 36.7 (06 Jan 2024 14:00), Max: 36.8 (06 Jan 2024 11:35)  T(F): 98 (06 Jan 2024 14:00), Max: 98.2 (06 Jan 2024 11:35)  HR: 62 (06 Jan 2024 14:00) (54 - 68)  BP: 149/77 (06 Jan 2024 14:00) (142/78 - 204/95)  BP(mean): --  RR: 18 (06 Jan 2024 14:00) (17 - 22)  SpO2: 100% (06 Jan 2024 14:00) (97% - 100%)    Parameters below as of 06 Jan 2024 14:00  Patient On (Oxygen Delivery Method): room air      CONSTITUTIONAL: NAD,   ENMT: Moist oral mucosa,   RESPIRATORY: Normal respiratory effort; lungs are clear to auscultation bilaterally  CARDIOVASCULAR: Regular rate and rhythm, normal S1 and S2, no murmur/rub/gallop; No lower extremity edema;   ABDOMEN: +epigastric pain, mild distension normoactive bowel sounds, no rebound/guarding; No hepatosplenomegaly  EXT: no edema b/l  NEUROLOGY: alert, following commands  SKIN: No rashes; no palpable lesions      LABS:                        14.0   11.98 )-----------( 201      ( 06 Jan 2024 05:40 )             42.0     06 Jan 2024 05:40    139    |  101    |  6      ----------------------------<  83     3.8     |  25     |  0.72     Ca    8.4        06 Jan 2024 05:40  Phos  2.3       06 Jan 2024 05:40  Mg     2.00      06 Jan 2024 05:40    TPro  6.5    /  Alb  3.8    /  TBili  0.5    /  DBili  x      /  AST  42     /  ALT  83     /  AlkPhos  93     06 Jan 2024 05:40      CAPILLARY BLOOD GLUCOSE      POCT Blood Glucose.: 97 mg/dL (06 Jan 2024 13:09)  POCT Blood Glucose.: 88 mg/dL (06 Jan 2024 08:41)  POCT Blood Glucose.: 89 mg/dL (06 Jan 2024 05:30)  POCT Blood Glucose.: 97 mg/dL (05 Jan 2024 23:38)  POCT Blood Glucose.: 95 mg/dL (05 Jan 2024 18:18)    BLOOD CULTURE    RADIOLOGY & ADDITIONAL TESTS:    Imaging Personally Reviewed:  [ ] YES     Consultant(s) Notes Reviewed:      Care Discussed with Consultants/Other Providers: Tammy Orellana MD   [facility] Medicine  Teams preferred  Pager: [phone]    Patient is a 48y old  Male who presents with a chief complaint of Acute pancreatitis (06 Jan 2024 12:21)      INTERVAL HPI/OVERNIGHT EVENTS: overnight pain was worse again. Pt reports he has two types of pain-epigastric radiating to the back, as well as burning pain and diffuse lower quadrant pain. 1mg dilaudid now is more effective. tylenol also helpful and toradol    MEDICATIONS  (STANDING):  dextrose 5%. 1000 milliLiter(s) (50 mL/Hr) IV Continuous <Continuous>  dextrose 5%. 1000 milliLiter(s) (100 mL/Hr) IV Continuous <Continuous>  dextrose 50% Injectable 25 Gram(s) IV Push once  dextrose 50% Injectable 25 Gram(s) IV Push once  dextrose 50% Injectable 12.5 Gram(s) IV Push once  enoxaparin Injectable 40 milliGRAM(s) SubCutaneous every 24 hours  famotidine  IVPB 20 milliGRAM(s) IV Intermittent two times a day  glucagon  Injectable 1 milliGRAM(s) IntraMuscular once  insulin lispro (ADMELOG) corrective regimen sliding scale   SubCutaneous every 6 hours  ketorolac   Injectable 15 milliGRAM(s) IV Push every 6 hours  lactated ringers. 1000 milliLiter(s) (200 mL/Hr) IV Continuous <Continuous>  polyethylene glycol 3350 17 Gram(s) Oral daily    MEDICATIONS  (PRN):  acetaminophen     Tablet .. 650 milliGRAM(s) Oral every 6 hours PRN Temp greater or equal to 38C (100.4F), Mild Pain (1 - 3), Moderate Pain (4 - 6), Severe Pain (7 - 10)  ALPRAZolam 1 milliGRAM(s) Oral at bedtime PRN for anxiety  aluminum hydroxide/magnesium hydroxide/simethicone Suspension 30 milliLiter(s) Oral every 6 hours PRN Dyspepsia  dextrose Oral Gel 15 Gram(s) Oral once PRN Blood Glucose LESS THAN 70 milliGRAM(s)/deciliter  HYDROmorphone  Injectable 1 milliGRAM(s) IV Push every 3 hours PRN Moderate and Severe pain  ondansetron Injectable 4 milliGRAM(s) IV Push every 8 hours PRN Nausea and/or Vomiting  saline laxative (FLEET) Rectal Enema 1 Enema Rectal daily PRN constipation  senna 2 Tablet(s) Oral at bedtime PRN Constipation  simethicone 80 milliGRAM(s) Chew daily PRN Gas      Allergies    No Known Allergies    Intolerances        REVIEW OF SYSTEMS:  Please see interval HPI:    Vital Signs Last 24 Hrs  T(C): 36.7 (06 Jan 2024 14:00), Max: 36.8 (06 Jan 2024 11:35)  T(F): 98 (06 Jan 2024 14:00), Max: 98.2 (06 Jan 2024 11:35)  HR: 62 (06 Jan 2024 14:00) (54 - 68)  BP: 149/77 (06 Jan 2024 14:00) (142/78 - 204/95)  BP(mean): --  RR: 18 (06 Jan 2024 14:00) (17 - 22)  SpO2: 100% (06 Jan 2024 14:00) (97% - 100%)    Parameters below as of 06 Jan 2024 14:00  Patient On (Oxygen Delivery Method): room air      I&O's Detail    06 Jan 2024 07:01  -  06 Jan 2024 17:04  --------------------------------------------------------  IN:    Lactated Ringers: 2400 mL  Total IN: 2400 mL    OUT:    Voided (mL): 600 mL  Total OUT: 600 mL    Total NET: 1800 mL            PHYSICAL EXAM:  Vital Signs Last 24 Hrs  T(C): 36.7 (06 Jan 2024 14:00), Max: 36.8 (06 Jan 2024 11:35)  T(F): 98 (06 Jan 2024 14:00), Max: 98.2 (06 Jan 2024 11:35)  HR: 62 (06 Jan 2024 14:00) (54 - 68)  BP: 149/77 (06 Jan 2024 14:00) (142/78 - 204/95)  BP(mean): --  RR: 18 (06 Jan 2024 14:00) (17 - 22)  SpO2: 100% (06 Jan 2024 14:00) (97% - 100%)    Parameters below as of 06 Jan 2024 14:00  Patient On (Oxygen Delivery Method): room air      CONSTITUTIONAL: NAD,   ENMT: Moist oral mucosa,   RESPIRATORY: Normal respiratory effort; lungs are clear to auscultation bilaterally  CARDIOVASCULAR: Regular rate and rhythm, normal S1 and S2, no murmur/rub/gallop; No lower extremity edema;   ABDOMEN: +epigastric pain, mild distension normoactive bowel sounds, no rebound/guarding; No hepatosplenomegaly  EXT: no edema b/l  NEUROLOGY: alert, following commands  SKIN: No rashes; no palpable lesions      LABS:                        14.0   11.98 )-----------( 201      ( 06 Jan 2024 05:40 )             42.0     06 Jan 2024 05:40    139    |  101    |  6      ----------------------------<  83     3.8     |  25     |  0.72     Ca    8.4        06 Jan 2024 05:40  Phos  2.3       06 Jan 2024 05:40  Mg     2.00      06 Jan 2024 05:40    TPro  6.5    /  Alb  3.8    /  TBili  0.5    /  DBili  x      /  AST  42     /  ALT  83     /  AlkPhos  93     06 Jan 2024 05:40      CAPILLARY BLOOD GLUCOSE      POCT Blood Glucose.: 97 mg/dL (06 Jan 2024 13:09)  POCT Blood Glucose.: 88 mg/dL (06 Jan 2024 08:41)  POCT Blood Glucose.: 89 mg/dL (06 Jan 2024 05:30)  POCT Blood Glucose.: 97 mg/dL (05 Jan 2024 23:38)  POCT Blood Glucose.: 95 mg/dL (05 Jan 2024 18:18)    BLOOD CULTURE    RADIOLOGY & ADDITIONAL TESTS:    Imaging Personally Reviewed:  [ ] YES     Consultant(s) Notes Reviewed:      Care Discussed with Consultants/Other Providers:

## 2024-01-06 NOTE — PROGRESS NOTE ADULT - ASSESSMENT
49 yo man with history of recurrent pancreatitis, ?asthma/COPD (not on home O2), rheumatoid arthritis (not on any meds),  type 2 DM (not on insulin), anxiety, and alcohol use disorder (sober for last ~20 years) presents with worsening epigastric pain that started shortly after pt was discharged from [facility] on 12/19/23, admitted with acute pancreatitis. Pain syndrome and elevated lipase is consistent.  49 yo man with history of recurrent pancreatitis, ?asthma/COPD (not on home O2), rheumatoid arthritis (not on any meds),  type 2 DM (not on insulin), anxiety, and alcohol use disorder (sober for last ~20 years) presents with worsening epigastric pain that started shortly after pt was discharged from [facility] on 12/19/23, admitted with acute pancreatitis. Pain syndrome and elevated lipase is consistent.

## 2024-01-07 ENCOUNTER — TRANSCRIPTION ENCOUNTER (OUTPATIENT)
Age: 49
End: 2024-01-07

## 2024-01-07 DIAGNOSIS — R79.89 OTHER SPECIFIED ABNORMAL FINDINGS OF BLOOD CHEMISTRY: ICD-10-CM

## 2024-01-07 LAB
ALBUMIN SERPL ELPH-MCNC: 4.1 G/DL — SIGNIFICANT CHANGE UP (ref 3.3–5)
ALBUMIN SERPL ELPH-MCNC: 4.1 G/DL — SIGNIFICANT CHANGE UP (ref 3.3–5)
ALP SERPL-CCNC: 192 U/L — HIGH (ref 40–120)
ALP SERPL-CCNC: 192 U/L — HIGH (ref 40–120)
ALT FLD-CCNC: 290 U/L — HIGH (ref 4–41)
ALT FLD-CCNC: 290 U/L — HIGH (ref 4–41)
ANION GAP SERPL CALC-SCNC: 15 MMOL/L — HIGH (ref 7–14)
ANION GAP SERPL CALC-SCNC: 15 MMOL/L — HIGH (ref 7–14)
AST SERPL-CCNC: 293 U/L — HIGH (ref 4–40)
AST SERPL-CCNC: 293 U/L — HIGH (ref 4–40)
BILIRUB SERPL-MCNC: 3.2 MG/DL — HIGH (ref 0.2–1.2)
BILIRUB SERPL-MCNC: 3.2 MG/DL — HIGH (ref 0.2–1.2)
BUN SERPL-MCNC: 6 MG/DL — LOW (ref 7–23)
BUN SERPL-MCNC: 6 MG/DL — LOW (ref 7–23)
CALCIUM SERPL-MCNC: 9 MG/DL — SIGNIFICANT CHANGE UP (ref 8.4–10.5)
CALCIUM SERPL-MCNC: 9 MG/DL — SIGNIFICANT CHANGE UP (ref 8.4–10.5)
CHLORIDE SERPL-SCNC: 101 MMOL/L — SIGNIFICANT CHANGE UP (ref 98–107)
CHLORIDE SERPL-SCNC: 101 MMOL/L — SIGNIFICANT CHANGE UP (ref 98–107)
CO2 SERPL-SCNC: 23 MMOL/L — SIGNIFICANT CHANGE UP (ref 22–31)
CO2 SERPL-SCNC: 23 MMOL/L — SIGNIFICANT CHANGE UP (ref 22–31)
CREAT SERPL-MCNC: 0.73 MG/DL — SIGNIFICANT CHANGE UP (ref 0.5–1.3)
CREAT SERPL-MCNC: 0.73 MG/DL — SIGNIFICANT CHANGE UP (ref 0.5–1.3)
EGFR: 111 ML/MIN/1.73M2 — SIGNIFICANT CHANGE UP
EGFR: 111 ML/MIN/1.73M2 — SIGNIFICANT CHANGE UP
GLUCOSE BLDC GLUCOMTR-MCNC: 71 MG/DL — SIGNIFICANT CHANGE UP (ref 70–99)
GLUCOSE BLDC GLUCOMTR-MCNC: 71 MG/DL — SIGNIFICANT CHANGE UP (ref 70–99)
GLUCOSE BLDC GLUCOMTR-MCNC: 78 MG/DL — SIGNIFICANT CHANGE UP (ref 70–99)
GLUCOSE BLDC GLUCOMTR-MCNC: 78 MG/DL — SIGNIFICANT CHANGE UP (ref 70–99)
GLUCOSE BLDC GLUCOMTR-MCNC: 85 MG/DL — SIGNIFICANT CHANGE UP (ref 70–99)
GLUCOSE BLDC GLUCOMTR-MCNC: 85 MG/DL — SIGNIFICANT CHANGE UP (ref 70–99)
GLUCOSE BLDC GLUCOMTR-MCNC: 87 MG/DL — SIGNIFICANT CHANGE UP (ref 70–99)
GLUCOSE BLDC GLUCOMTR-MCNC: 87 MG/DL — SIGNIFICANT CHANGE UP (ref 70–99)
GLUCOSE SERPL-MCNC: 67 MG/DL — LOW (ref 70–99)
GLUCOSE SERPL-MCNC: 67 MG/DL — LOW (ref 70–99)
HCT VFR BLD CALC: 43.4 % — SIGNIFICANT CHANGE UP (ref 39–50)
HCT VFR BLD CALC: 43.4 % — SIGNIFICANT CHANGE UP (ref 39–50)
HGB BLD-MCNC: 14.6 G/DL — SIGNIFICANT CHANGE UP (ref 13–17)
HGB BLD-MCNC: 14.6 G/DL — SIGNIFICANT CHANGE UP (ref 13–17)
MAGNESIUM SERPL-MCNC: 2.1 MG/DL — SIGNIFICANT CHANGE UP (ref 1.6–2.6)
MAGNESIUM SERPL-MCNC: 2.1 MG/DL — SIGNIFICANT CHANGE UP (ref 1.6–2.6)
MCHC RBC-ENTMCNC: 27.8 PG — SIGNIFICANT CHANGE UP (ref 27–34)
MCHC RBC-ENTMCNC: 27.8 PG — SIGNIFICANT CHANGE UP (ref 27–34)
MCHC RBC-ENTMCNC: 33.6 GM/DL — SIGNIFICANT CHANGE UP (ref 32–36)
MCHC RBC-ENTMCNC: 33.6 GM/DL — SIGNIFICANT CHANGE UP (ref 32–36)
MCV RBC AUTO: 82.5 FL — SIGNIFICANT CHANGE UP (ref 80–100)
MCV RBC AUTO: 82.5 FL — SIGNIFICANT CHANGE UP (ref 80–100)
MRSA PCR RESULT.: SIGNIFICANT CHANGE UP
MRSA PCR RESULT.: SIGNIFICANT CHANGE UP
NRBC # BLD: 0 /100 WBCS — SIGNIFICANT CHANGE UP (ref 0–0)
NRBC # BLD: 0 /100 WBCS — SIGNIFICANT CHANGE UP (ref 0–0)
NRBC # FLD: 0 K/UL — SIGNIFICANT CHANGE UP (ref 0–0)
NRBC # FLD: 0 K/UL — SIGNIFICANT CHANGE UP (ref 0–0)
PHOSPHATE SERPL-MCNC: 2.7 MG/DL — SIGNIFICANT CHANGE UP (ref 2.5–4.5)
PHOSPHATE SERPL-MCNC: 2.7 MG/DL — SIGNIFICANT CHANGE UP (ref 2.5–4.5)
PLATELET # BLD AUTO: 226 K/UL — SIGNIFICANT CHANGE UP (ref 150–400)
PLATELET # BLD AUTO: 226 K/UL — SIGNIFICANT CHANGE UP (ref 150–400)
POTASSIUM SERPL-MCNC: 4.1 MMOL/L — SIGNIFICANT CHANGE UP (ref 3.5–5.3)
POTASSIUM SERPL-MCNC: 4.1 MMOL/L — SIGNIFICANT CHANGE UP (ref 3.5–5.3)
POTASSIUM SERPL-SCNC: 4.1 MMOL/L — SIGNIFICANT CHANGE UP (ref 3.5–5.3)
POTASSIUM SERPL-SCNC: 4.1 MMOL/L — SIGNIFICANT CHANGE UP (ref 3.5–5.3)
PROT SERPL-MCNC: 6.7 G/DL — SIGNIFICANT CHANGE UP (ref 6–8.3)
PROT SERPL-MCNC: 6.7 G/DL — SIGNIFICANT CHANGE UP (ref 6–8.3)
RBC # BLD: 5.26 M/UL — SIGNIFICANT CHANGE UP (ref 4.2–5.8)
RBC # BLD: 5.26 M/UL — SIGNIFICANT CHANGE UP (ref 4.2–5.8)
RBC # FLD: 13.2 % — SIGNIFICANT CHANGE UP (ref 10.3–14.5)
RBC # FLD: 13.2 % — SIGNIFICANT CHANGE UP (ref 10.3–14.5)
S AUREUS DNA NOSE QL NAA+PROBE: DETECTED
S AUREUS DNA NOSE QL NAA+PROBE: DETECTED
SODIUM SERPL-SCNC: 139 MMOL/L — SIGNIFICANT CHANGE UP (ref 135–145)
SODIUM SERPL-SCNC: 139 MMOL/L — SIGNIFICANT CHANGE UP (ref 135–145)
WBC # BLD: 8.82 K/UL — SIGNIFICANT CHANGE UP (ref 3.8–10.5)
WBC # BLD: 8.82 K/UL — SIGNIFICANT CHANGE UP (ref 3.8–10.5)
WBC # FLD AUTO: 8.82 K/UL — SIGNIFICANT CHANGE UP (ref 3.8–10.5)
WBC # FLD AUTO: 8.82 K/UL — SIGNIFICANT CHANGE UP (ref 3.8–10.5)

## 2024-01-07 PROCEDURE — 99233 SBSQ HOSP IP/OBS HIGH 50: CPT

## 2024-01-07 RX ORDER — HYDROMORPHONE HYDROCHLORIDE 2 MG/ML
1 INJECTION INTRAMUSCULAR; INTRAVENOUS; SUBCUTANEOUS EVERY 4 HOURS
Refills: 0 | Status: DISCONTINUED | OUTPATIENT
Start: 2024-01-07 | End: 2024-01-07

## 2024-01-07 RX ORDER — PANTOPRAZOLE SODIUM 20 MG/1
40 TABLET, DELAYED RELEASE ORAL ONCE
Refills: 0 | Status: DISCONTINUED | OUTPATIENT
Start: 2024-01-07 | End: 2024-01-12

## 2024-01-07 RX ORDER — HYDROMORPHONE HYDROCHLORIDE 2 MG/ML
0.5 INJECTION INTRAMUSCULAR; INTRAVENOUS; SUBCUTANEOUS
Refills: 0 | Status: DISCONTINUED | OUTPATIENT
Start: 2024-01-07 | End: 2024-01-07

## 2024-01-07 RX ORDER — MUPIROCIN 20 MG/G
1 OINTMENT TOPICAL EVERY 12 HOURS
Refills: 0 | Status: COMPLETED | OUTPATIENT
Start: 2024-01-07 | End: 2024-01-12

## 2024-01-07 RX ORDER — HYDROMORPHONE HYDROCHLORIDE 2 MG/ML
2 INJECTION INTRAMUSCULAR; INTRAVENOUS; SUBCUTANEOUS ONCE
Refills: 0 | Status: DISCONTINUED | OUTPATIENT
Start: 2024-01-07 | End: 2024-01-07

## 2024-01-07 RX ORDER — HYDROMORPHONE HYDROCHLORIDE 2 MG/ML
1 INJECTION INTRAMUSCULAR; INTRAVENOUS; SUBCUTANEOUS
Refills: 0 | Status: DISCONTINUED | OUTPATIENT
Start: 2024-01-07 | End: 2024-01-09

## 2024-01-07 RX ADMIN — FAMOTIDINE 20 MILLIGRAM(S): 10 INJECTION INTRAVENOUS at 17:46

## 2024-01-07 RX ADMIN — Medication 1 MILLIGRAM(S): at 01:10

## 2024-01-07 RX ADMIN — Medication 15 MILLIGRAM(S): at 13:58

## 2024-01-07 RX ADMIN — HYDROMORPHONE HYDROCHLORIDE 1 MILLIGRAM(S): 2 INJECTION INTRAMUSCULAR; INTRAVENOUS; SUBCUTANEOUS at 05:52

## 2024-01-07 RX ADMIN — Medication 15 MILLIGRAM(S): at 14:30

## 2024-01-07 RX ADMIN — Medication 15 MILLIGRAM(S): at 08:07

## 2024-01-07 RX ADMIN — SODIUM CHLORIDE 150 MILLILITER(S): 9 INJECTION, SOLUTION INTRAVENOUS at 00:13

## 2024-01-07 RX ADMIN — Medication 15 MILLIGRAM(S): at 20:39

## 2024-01-07 RX ADMIN — FAMOTIDINE 20 MILLIGRAM(S): 10 INJECTION INTRAVENOUS at 05:54

## 2024-01-07 RX ADMIN — HYDROMORPHONE HYDROCHLORIDE 1 MILLIGRAM(S): 2 INJECTION INTRAMUSCULAR; INTRAVENOUS; SUBCUTANEOUS at 11:58

## 2024-01-07 RX ADMIN — Medication 15 MILLIGRAM(S): at 20:24

## 2024-01-07 RX ADMIN — HYDROMORPHONE HYDROCHLORIDE 1 MILLIGRAM(S): 2 INJECTION INTRAMUSCULAR; INTRAVENOUS; SUBCUTANEOUS at 12:30

## 2024-01-07 RX ADMIN — HYDROMORPHONE HYDROCHLORIDE 0.5 MILLIGRAM(S): 2 INJECTION INTRAMUSCULAR; INTRAVENOUS; SUBCUTANEOUS at 15:26

## 2024-01-07 RX ADMIN — POLYETHYLENE GLYCOL 3350 17 GRAM(S): 17 POWDER, FOR SOLUTION ORAL at 11:58

## 2024-01-07 RX ADMIN — SODIUM CHLORIDE 150 MILLILITER(S): 9 INJECTION, SOLUTION INTRAVENOUS at 13:31

## 2024-01-07 RX ADMIN — CHLORHEXIDINE GLUCONATE 1 APPLICATION(S): 213 SOLUTION TOPICAL at 05:58

## 2024-01-07 RX ADMIN — SODIUM CHLORIDE 150 MILLILITER(S): 9 INJECTION, SOLUTION INTRAVENOUS at 06:53

## 2024-01-07 RX ADMIN — HYDROMORPHONE HYDROCHLORIDE 2 MILLIGRAM(S): 2 INJECTION INTRAMUSCULAR; INTRAVENOUS; SUBCUTANEOUS at 17:46

## 2024-01-07 RX ADMIN — HYDROMORPHONE HYDROCHLORIDE 2 MILLIGRAM(S): 2 INJECTION INTRAMUSCULAR; INTRAVENOUS; SUBCUTANEOUS at 18:14

## 2024-01-07 RX ADMIN — Medication 15 MILLIGRAM(S): at 02:25

## 2024-01-07 RX ADMIN — HYDROMORPHONE HYDROCHLORIDE 0.5 MILLIGRAM(S): 2 INJECTION INTRAMUSCULAR; INTRAVENOUS; SUBCUTANEOUS at 15:56

## 2024-01-07 RX ADMIN — HYDROMORPHONE HYDROCHLORIDE 1 MILLIGRAM(S): 2 INJECTION INTRAMUSCULAR; INTRAVENOUS; SUBCUTANEOUS at 16:04

## 2024-01-07 RX ADMIN — HYDROMORPHONE HYDROCHLORIDE 1 MILLIGRAM(S): 2 INJECTION INTRAMUSCULAR; INTRAVENOUS; SUBCUTANEOUS at 06:07

## 2024-01-07 RX ADMIN — MUPIROCIN 1 APPLICATION(S): 20 OINTMENT TOPICAL at 17:47

## 2024-01-07 RX ADMIN — Medication 15 MILLIGRAM(S): at 02:10

## 2024-01-07 RX ADMIN — HYDROMORPHONE HYDROCHLORIDE 1 MILLIGRAM(S): 2 INJECTION INTRAMUSCULAR; INTRAVENOUS; SUBCUTANEOUS at 00:46

## 2024-01-07 RX ADMIN — HYDROMORPHONE HYDROCHLORIDE 1 MILLIGRAM(S): 2 INJECTION INTRAMUSCULAR; INTRAVENOUS; SUBCUTANEOUS at 00:31

## 2024-01-07 NOTE — PROGRESS NOTE ADULT - PROBLEM SELECTOR PLAN 2
more elevated today than the past 2 days  concern for ductal narrowing  will discuss with GI if EUS is needed sooner  dc tylenol.  will consider RUQ u/s

## 2024-01-07 NOTE — PROGRESS NOTE ADULT - PROBLEM SELECTOR PLAN 1
Pt presenting with worsening epigastric pain x2 weeks, associated with nausea and poor PO intake. Lipase elevated to 1282. Most likely acute pancreatitis, though unclear etiology. Pt denies any current alcohol use. TG 76 on admission. IgG4 in Dec 2023 wnl. Possibly from nicotine vaping? unclear why Pt regressed/relapsed. Previous CT scan reviewed,  repeat CT does note mildly worsening pancreatitis with narrowing of ampulla, unclear from inflammation vs stricture     - S/p 2L bolus of NS in ED. C/w IVF with LR at 200 cc/hr x12 hrs for now, reassess thereafter.  - c/w NPO and sips of water  -repeat CT does note mildly worsening pancreatitis with narrowing of ampulla, unclear from inflammation vs stricture   -change pain regimen to dilaudid 1mg q4h prn for severe pain, dilaudid 0.5mg q2h prn moderate,  atc toradol and prn tylenol. If needs breakthrough can give additional 0.5mg.   -I do feel like a component of his pain is also constipation and possible gastritis  -encourage bowel regimen  -pepcid BID IV BID  Pt is not requiring a lot of total dilaudid but is requiring frequent dosing. may need pca pump  - Serial abdominal exams  -appreciate GI input  -f/u repeat labs-LFTs stable

## 2024-01-07 NOTE — DISCHARGE NOTE PROVIDER - CARE PROVIDERS DIRECT ADDRESSES
,DirectAddress_Unknown,[email or web address] ,DirectAddress_Unknown,[email or web address] ,[email or web address],[email or web address],DirectAddress_Unknown ,DirectAddress_Unknown,[email or web address],DirectAddress_Unknown

## 2024-01-07 NOTE — DISCHARGE NOTE PROVIDER - PROVIDER TOKENS
PROVIDER:[TOKEN:[[id number]],FOLLOWUP:[1 week],ESTABLISHEDPATIENT:[T]],PROVIDER:[TOKEN:[[id number]],FOLLOWUP:[2 weeks],ESTABLISHEDPATIENT:[T]] PROVIDER:[TOKEN:[[id number]],FOLLOWUP:[1 week],ESTABLISHEDPATIENT:[T]],PROVIDER:[TOKEN:[[id number]],FOLLOWUP:[2 weeks],ESTABLISHEDPATIENT:[T]] PROVIDER:[TOKEN:[[id number]],FOLLOWUP:[1 week],ESTABLISHEDPATIENT:[T]],PROVIDER:[TOKEN:[[id number]],FOLLOWUP:[2 months]],FREE:[LAST:[[facility]],PHONE:[phone]],FAX:[(   )    -],FOLLOWUP:[2 weeks]] PROVIDER:[TOKEN:[[id number]],FOLLOWUP:[1 week],ESTABLISHEDPATIENT:[T]],PROVIDER:[TOKEN:[[id number]],FOLLOWUP:[2 months]],FREE:[LAST:[[facility]],PHONE:[phone]],FAX:[(   )    -],FOLLOWUP:[2 weeks]]

## 2024-01-07 NOTE — DISCHARGE NOTE PROVIDER - HOSPITAL COURSE
47 yo man with history of recurrent pancreatitis, ?asthma/COPD (not on home O2), rheumatoid arthritis (not on any meds),  type 2 DM (not on insulin), anxiety, and alcohol use disorder (sober for last ~20 years) presents with worsening epigastric pain that started shortly after pt was discharged from [facility] on 12/19/23, admitted with likely acute pancreatitis.     #recurrent acute pancreatitis: Repeat CT abd/pelv done which noted worsening of acute pancreatitis with new pancreatic ductal dilation to level of the ampulla. unclear cause of ductal dilation, possibly secondary to an ampullary stricture or severe inflammation of periampullary duodenal wall. Pt's LFTs have continued to rise. GI consulted and Pt is being considered for EUS/ERCP.     CT abd/pelv:  IMPRESSION:  Mild interval worsening of acute pancreatitis with new pancreatic ductal   dilatation to level of the ampulla.  Ductal dilatation could be due to an ampullary stricture or severe   inflammation of the periampullary duodenal wall.   49 yo man with history of recurrent pancreatitis, ?asthma/COPD (not on home O2), rheumatoid arthritis (not on any meds),  type 2 DM (not on insulin), anxiety, and alcohol use disorder (sober for last ~20 years) presents with worsening epigastric pain that started shortly after pt was discharged from [facility] on 12/19/23, admitted with likely acute pancreatitis.     #recurrent acute pancreatitis: Repeat CT abd/pelv done which noted worsening of acute pancreatitis with new pancreatic ductal dilation to level of the ampulla. unclear cause of ductal dilation, possibly secondary to an ampullary stricture or severe inflammation of periampullary duodenal wall. Pt's LFTs have continued to rise. GI consulted and Pt is being considered for EUS/ERCP.     CT abd/pelv:  IMPRESSION:  Mild interval worsening of acute pancreatitis with new pancreatic ductal   dilatation to level of the ampulla.  Ductal dilatation could be due to an ampullary stricture or severe   inflammation of the periampullary duodenal wall.   49 yo man with history of recurrent pancreatitis, ?asthma/COPD (not on home O2), rheumatoid arthritis (not on any meds),  type 2 DM (not on insulin), anxiety, and alcohol use disorder (sober for last ~20 years) presents with worsening epigastric pain that started shortly after pt was discharged from [facility] on 12/19/23, admitted with likely acute pancreatitis.     #recurrent acute pancreatitis: Repeat CT abd/pelv done which noted worsening of acute pancreatitis with new pancreatic ductal dilation to level of the ampulla. unclear cause of ductal dilation, possibly secondary to an ampullary stricture or severe inflammation of periampullary duodenal wall. Pt's LFTs have continued to rise. GI consulted and Pt is being considered for EUS/ERCP.     Hospital Course:  49 yo man with history of recurrent pancreatitis, ?asthma/COPD (not on home O2), rheumatoid arthritis (not on any meds),  type 2 DM (not on insulin), anxiety, and alcohol use disorder (sober for last ~20 years) presents with worsening epigastric pain that started shortly after pt was discharged from [facility] on 12/19/23, admitted with acute pancreatitis. Pain syndrome and elevated lipase is consistent.   CT Abdomen and Pelvis w/ IV Cont (01.04.24 @ 15:45) >  IMPRESSION:  Mild interval worsening of acute pancreatitis with new pancreatic ductal   dilatation to level of theampulla.  Ductal dilatation could be due to an ampullary stricture or severe   inflammation of the periampullary duodenal wall.    1/11 Acute pancreatitis present on admision secondary to stone in Pancreatic Duct now resolved with EUS and ERCP 1/9 and stent to PD and Sphincterotomy of biliary duct.         47 yo man with history of recurrent pancreatitis, ?asthma/COPD (not on home O2), rheumatoid arthritis (not on any meds),  type 2 DM (not on insulin), anxiety, and alcohol use disorder (sober for last ~20 years) presents with worsening epigastric pain that started shortly after pt was discharged from [facility] on 12/19/23, admitted with likely acute pancreatitis.     #recurrent acute pancreatitis: Repeat CT abd/pelv done which noted worsening of acute pancreatitis with new pancreatic ductal dilation to level of the ampulla. unclear cause of ductal dilation, possibly secondary to an ampullary stricture or severe inflammation of periampullary duodenal wall. Pt's LFTs have continued to rise. GI consulted and Pt is being considered for EUS/ERCP.     Hospital Course:  47 yo man with history of recurrent pancreatitis, ?asthma/COPD (not on home O2), rheumatoid arthritis (not on any meds),  type 2 DM (not on insulin), anxiety, and alcohol use disorder (sober for last ~20 years) presents with worsening epigastric pain that started shortly after pt was discharged from [facility] on 12/19/23, admitted with acute pancreatitis. Pain syndrome and elevated lipase is consistent.   CT Abdomen and Pelvis w/ IV Cont (01.04.24 @ 15:45) >  IMPRESSION:  Mild interval worsening of acute pancreatitis with new pancreatic ductal   dilatation to level of theampulla.  Ductal dilatation could be due to an ampullary stricture or severe   inflammation of the periampullary duodenal wall.    1/11 Acute pancreatitis present on admision secondary to stone in Pancreatic Duct now resolved with EUS and ERCP 1/9 and stent to PD and Sphincterotomy of biliary duct.         49 yo man with history of recurrent pancreatitis, ?asthma/COPD (not on home O2), rheumatoid arthritis (not on any meds),  type 2 DM (not on insulin), anxiety, and alcohol use disorder (sober for last ~20 years) presents with worsening epigastric pain that started shortly after pt was discharged from [facility] on 12/19/23, admitted with likely acute pancreatitis.   #recurrent acute pancreatitis: Repeat CT abd/pelv done which noted worsening of acute pancreatitis with new pancreatic ductal dilation to level of the ampulla. unclear cause of ductal dilation, possibly secondary to an ampullary stricture or severe inflammation of periampullary duodenal wall. Pt's LFTs have continued to rise. GI consulted and Pt is being considered for EUS/ERCP.     Hospital Course:  49 yo man with history of recurrent pancreatitis, ?asthma/COPD (not on home O2), rheumatoid arthritis (not on any meds),  type 2 DM (not on insulin), anxiety, and alcohol use disorder (sober for last ~20 years) presents with worsening epigastric pain that started shortly after pt was discharged from [facility] on 12/19/23, admitted with acute pancreatitis. Pain syndrome and elevated lipase is consistent.   CT Abdomen and Pelvis w/ IV Cont (01.04.24 @ 15:45) >  IMPRESSION:  Mild interval worsening of acute pancreatitis with new pancreatic ductal   dilatation to level of theampulla.  Ductal dilatation could be due to an ampullary stricture or severe   inflammation of the periampullary duodenal wall.    1/11 Acute pancreatitis present on admission secondary to stone in Pancreatic Duct now resolved with EUS and ERCP 1/9 and stent to PD and Sphincterotomy of biliary duct.    Acute pancreatitis.   ·  Plan: Pt presenting with worsening epigastric pain x2 weeks, associated with nausea and poor PO intake. Lipase elevated to 1282. Most likely acute pancreatitis, though unclear etiology. Pt denies any current alcohol use. TG 76 on admission. IgG4 in Dec 2023 wnl. Possibly from nicotine vaping? unclear why Pt regressed/relapsed. Previous CT scan reviewed,  repeat CT does note mildly worsening pancreatitis with narrowing of ampulla, unclear from inflammation vs stricture   - S/p 2L bolus of NS in ED.   - c/w NPO and sips of water and IVF  -repeat CT does note mildly worsening pancreatitis with narrowing of ampulla, unclear from inflammation vs stricture   -change pain regimen to dilaudid 1mg q4h ---> q3 --->On  1/9 inc to 1.5 mg q3 prn  -encourage bowel regimen  repeat LFT rising---> hepatology consult requested.  1/9 EUS/ ERCP with stent to pancreatic duct done and removal of pancreatic stone, Sphincterotomy to biliaryducr with removal of stone  1/10 Tbili 6---> 1/11 Improved LFT with T bili 2.7 on 1/11/24. Gi recommended advancing diet and out patient f/u  1/12 LFT      Problem/Plan - 2:  ·  Problem: Elevated LFTs.   ·  Plan: Hepatology consult requested 1/8 1/9 GI did EUS and ERCP  Gi greatly appreciated  1/10 EUS as above. Tbili 6.3. f/u MRCP today and GI  1/11 Resolving LFT, T bili 2.7.     Problem/Plan - 3:  ·  Problem: Leukocytosis.   ·  Plan: WBC 12.91 on admission. Pt did not meet SIRS criteria, however. Leukocytosis likely in setting of acute pancreatitis. Pt afebrile.  -resolved   - Monitor off antibiotics for now  1/9/23 WBC 7.5 RESOLVED LEUKOCYTOSIS.           49 yo man with history of recurrent pancreatitis, ?asthma/COPD (not on home O2), rheumatoid arthritis (not on any meds),  type 2 DM (not on insulin), anxiety, and alcohol use disorder (sober for last ~20 years) presents with worsening epigastric pain that started shortly after pt was discharged from [facility] on 12/19/23, admitted with likely acute pancreatitis.   #recurrent acute pancreatitis: Repeat CT abd/pelv done which noted worsening of acute pancreatitis with new pancreatic ductal dilation to level of the ampulla. unclear cause of ductal dilation, possibly secondary to an ampullary stricture or severe inflammation of periampullary duodenal wall. Pt's LFTs have continued to rise. GI consulted and Pt is being considered for EUS/ERCP.     Hospital Course:  49 yo man with history of recurrent pancreatitis, ?asthma/COPD (not on home O2), rheumatoid arthritis (not on any meds),  type 2 DM (not on insulin), anxiety, and alcohol use disorder (sober for last ~20 years) presents with worsening epigastric pain that started shortly after pt was discharged from [facility] on 12/19/23, admitted with acute pancreatitis. Pain syndrome and elevated lipase is consistent.   CT Abdomen and Pelvis w/ IV Cont (01.04.24 @ 15:45) >  IMPRESSION:  Mild interval worsening of acute pancreatitis with new pancreatic ductal   dilatation to level of theampulla.  Ductal dilatation could be due to an ampullary stricture or severe   inflammation of the periampullary duodenal wall.    1/11 Acute pancreatitis present on admission secondary to stone in Pancreatic Duct now resolved with EUS and ERCP 1/9 and stent to PD and Sphincterotomy of biliary duct.    Acute pancreatitis.   ·  Plan: Pt presenting with worsening epigastric pain x2 weeks, associated with nausea and poor PO intake. Lipase elevated to 1282. Most likely acute pancreatitis, though unclear etiology. Pt denies any current alcohol use. TG 76 on admission. IgG4 in Dec 2023 wnl. Possibly from nicotine vaping? unclear why Pt regressed/relapsed. Previous CT scan reviewed,  repeat CT does note mildly worsening pancreatitis with narrowing of ampulla, unclear from inflammation vs stricture   - S/p 2L bolus of NS in ED.   - c/w NPO and sips of water and IVF  -repeat CT does note mildly worsening pancreatitis with narrowing of ampulla, unclear from inflammation vs stricture   -change pain regimen to dilaudid 1mg q4h ---> q3 --->On  1/9 inc to 1.5 mg q3 prn  -encourage bowel regimen  repeat LFT rising---> hepatology consult requested.  1/9 EUS/ ERCP with stent to pancreatic duct done and removal of pancreatic stone, Sphincterotomy to biliaryducr with removal of stone  1/10 Tbili 6---> 1/11 Improved LFT with T bili 2.7 on 1/11/24. Gi recommended advancing diet and out patient f/u  1/12 LFT      Problem/Plan - 2:  ·  Problem: Elevated LFTs.   ·  Plan: Hepatology consult requested 1/8 1/9 GI did EUS and ERCP  Gi greatly appreciated  1/10 EUS as above. Tbili 6.3. f/u MRCP today and GI  1/11 Resolving LFT, T bili 2.7.     Problem/Plan - 3:  ·  Problem: Leukocytosis.   ·  Plan: WBC 12.91 on admission. Pt did not meet SIRS criteria, however. Leukocytosis likely in setting of acute pancreatitis. Pt afebrile.  -resolved   - Monitor off antibiotics for now  1/9/23 WBC 7.5 RESOLVED LEUKOCYTOSIS.           47 yo man with history of recurrent pancreatitis, ?asthma/COPD (not on home O2), rheumatoid arthritis (not on any meds),  type 2 DM (not on insulin), anxiety, and alcohol use disorder (sober for last ~20 years) presents with worsening epigastric pain that started shortly after pt was discharged from [facility] on 12/19/23, admitted with likely acute pancreatitis.   #recurrent acute pancreatitis: Repeat CT abd/pelv done which noted worsening of acute pancreatitis with new pancreatic ductal dilation to level of the ampulla. unclear cause of ductal dilation, possibly secondary to an ampullary stricture or severe inflammation of periampullary duodenal wall. Pt's LFTs have continued to rise. GI consulted and Pt is being considered for EUS/ERCP.     Hospital Course:  47 yo man with history of recurrent pancreatitis, ?asthma/COPD (not on home O2), rheumatoid arthritis (not on any meds),  type 2 DM (not on insulin), anxiety, and alcohol use disorder (sober for last ~20 years) presents with worsening epigastric pain that started shortly after pt was discharged from [facility] on 12/19/23, admitted with acute pancreatitis. Pain syndrome and elevated lipase is consistent.   CT Abdomen and Pelvis w/ IV Cont (01.04.24 @ 15:45) >  IMPRESSION:  Mild interval worsening of acute pancreatitis with new pancreatic ductal   dilatation to level of the ampulla.  Ductal dilatation could be due to an ampullary stricture or severe   inflammation of the periampullary duodenal wall.    1/11 Acute pancreatitis present on admission secondary to stone in Pancreatic Duct now resolved with EUS and ERCP 1/9 and stent to PD and Sphincterotomy of biliary duct.    Acute pancreatitis.   ·  Plan: Pt presenting with worsening epigastric pain x2 weeks, associated with nausea and poor PO intake. Lipase elevated to 1282. Most likely acute pancreatitis, though unclear etiology. Pt denies any current alcohol use. TG 76 on admission. IgG4 in Dec 2023 wnl. Possibly from nicotine vaping? unclear why Pt regressed/relapsed. Previous CT scan reviewed,  repeat CT does note mildly worsening pancreatitis with narrowing of ampulla, unclear from inflammation   vs stricture   - S/p 2L bolus of NS in ED.   - c/w NPO and sips of water and IVF  -repeat CT does note mildly worsening pancreatitis with narrowing of ampulla, unclear from inflammation vs stricture   -change pain regimen to dilaudid 1mg q4h ---> q3 --->On  1/9 inc to 1.5 mg q3 prn  -encourage bowel regimen  repeat LFT rising---> hepatology consult requested.  1/9 EUS/ ERCP with stent to pancreatic duct done and removal of pancreatic stone, Sphincterotomy to biliaryducr with removal of stone  1/10 Tbili 6---> 1/11 Improved LFT with T bili 2.7 on 1/11/24. Gi recommended advancing diet and out patient f/u  1/12 LFT t bili 2.1  Pain medication changed to po ibuprofen    Problem/Plan - 2:  ·  Problem: Elevated LFTs.   ·  Plan: Hepatology consult requested 1/8 1/9 GI did EUS and ERCP  Gi greatly appreciated  1/10 EUS as above. Tbili 6.3. f/u MRCP today and GI  1/11 Resolving LFT, T bili 2.7.     Problem/Plan - 3:  ·  Problem: Leukocytosis.   ·  Plan: WBC 12.91 on admission. Pt did not meet SIRS criteria, however. Leukocytosis likely in setting of acute pancreatitis. Pt afebrile.  -resolved   - Monitor off antibiotics for now  1/9/23 WBC 7.5 RESOLVED LEUKOCYTOSIS.    1/12 ready for home for home  F/u GI , PCP at [facility] and advanced Gi to remove PD stent in 3 months           47 yo man with history of recurrent pancreatitis, ?asthma/COPD (not on home O2), rheumatoid arthritis (not on any meds),  type 2 DM (not on insulin), anxiety, and alcohol use disorder (sober for last ~20 years) presents with worsening epigastric pain that started shortly after pt was discharged from [facility] on 12/19/23, admitted with likely acute pancreatitis.   #recurrent acute pancreatitis: Repeat CT abd/pelv done which noted worsening of acute pancreatitis with new pancreatic ductal dilation to level of the ampulla. unclear cause of ductal dilation, possibly secondary to an ampullary stricture or severe inflammation of periampullary duodenal wall. Pt's LFTs have continued to rise. GI consulted and Pt is being considered for EUS/ERCP.     Hospital Course:  47 yo man with history of recurrent pancreatitis, ?asthma/COPD (not on home O2), rheumatoid arthritis (not on any meds),  type 2 DM (not on insulin), anxiety, and alcohol use disorder (sober for last ~20 years) presents with worsening epigastric pain that started shortly after pt was discharged from [facility] on 12/19/23, admitted with acute pancreatitis. Pain syndrome and elevated lipase is consistent.   CT Abdomen and Pelvis w/ IV Cont (01.04.24 @ 15:45) >  IMPRESSION:  Mild interval worsening of acute pancreatitis with new pancreatic ductal   dilatation to level of the ampulla.  Ductal dilatation could be due to an ampullary stricture or severe   inflammation of the periampullary duodenal wall.    1/11 Acute pancreatitis present on admission secondary to stone in Pancreatic Duct now resolved with EUS and ERCP 1/9 and stent to PD and Sphincterotomy of biliary duct.    Acute pancreatitis.   ·  Plan: Pt presenting with worsening epigastric pain x2 weeks, associated with nausea and poor PO intake. Lipase elevated to 1282. Most likely acute pancreatitis, though unclear etiology. Pt denies any current alcohol use. TG 76 on admission. IgG4 in Dec 2023 wnl. Possibly from nicotine vaping? unclear why Pt regressed/relapsed. Previous CT scan reviewed,  repeat CT does note mildly worsening pancreatitis with narrowing of ampulla, unclear from inflammation   vs stricture   - S/p 2L bolus of NS in ED.   - c/w NPO and sips of water and IVF  -repeat CT does note mildly worsening pancreatitis with narrowing of ampulla, unclear from inflammation vs stricture   -change pain regimen to dilaudid 1mg q4h ---> q3 --->On  1/9 inc to 1.5 mg q3 prn  -encourage bowel regimen  repeat LFT rising---> hepatology consult requested.  1/9 EUS/ ERCP with stent to pancreatic duct done and removal of pancreatic stone, Sphincterotomy to biliaryducr with removal of stone  1/10 Tbili 6---> 1/11 Improved LFT with T bili 2.7 on 1/11/24. Gi recommended advancing diet and out patient f/u  1/12 LFT t bili 2.1  Pain medication changed to po ibuprofen    Problem/Plan - 2:  ·  Problem: Elevated LFTs.   ·  Plan: Hepatology consult requested 1/8 1/9 GI did EUS and ERCP  Gi greatly appreciated  1/10 EUS as above. Tbili 6.3. f/u MRCP today and GI  1/11 Resolving LFT, T bili 2.7.     Problem/Plan - 3:  ·  Problem: Leukocytosis.   ·  Plan: WBC 12.91 on admission. Pt did not meet SIRS criteria, however. Leukocytosis likely in setting of acute pancreatitis. Pt afebrile.  -resolved   - Monitor off antibiotics for now  1/9/23 WBC 7.5 RESOLVED LEUKOCYTOSIS.    1/12 ready for home for home  F/u GI , PCP at [facility] and advanced Gi to remove PD stent in 3 months

## 2024-01-07 NOTE — PROGRESS NOTE ADULT - SUBJECTIVE AND OBJECTIVE BOX
Tammy Orellana MD   [facility] Medicine  Teams preferred  Pager: [phone]    Patient is a 48y old  Male who presents with a chief complaint of Acute pancreatitis (06 Jan 2024 17:03)      INTERVAL HPI/OVERNIGHT EVENTS: yesterday Pt did have a bowel movement, 2 small    MEDICATIONS  (STANDING):  chlorhexidine 2% Cloths 1 Application(s) Topical <User Schedule>  dextrose 5%. 1000 milliLiter(s) (100 mL/Hr) IV Continuous <Continuous>  dextrose 5%. 1000 milliLiter(s) (50 mL/Hr) IV Continuous <Continuous>  dextrose 50% Injectable 25 Gram(s) IV Push once  dextrose 50% Injectable 12.5 Gram(s) IV Push once  dextrose 50% Injectable 25 Gram(s) IV Push once  enoxaparin Injectable 40 milliGRAM(s) SubCutaneous every 24 hours  famotidine Injectable 20 milliGRAM(s) IV Push every 12 hours  glucagon  Injectable 1 milliGRAM(s) IntraMuscular once  insulin lispro (ADMELOG) corrective regimen sliding scale   SubCutaneous every 6 hours  ketorolac   Injectable 15 milliGRAM(s) IV Push every 6 hours  lactated ringers. 1000 milliLiter(s) (150 mL/Hr) IV Continuous <Continuous>  polyethylene glycol 3350 17 Gram(s) Oral daily    MEDICATIONS  (PRN):  acetaminophen     Tablet .. 650 milliGRAM(s) Oral every 6 hours PRN Temp greater or equal to 38C (100.4F), Mild Pain (1 - 3), Moderate Pain (4 - 6), Severe Pain (7 - 10)  ALPRAZolam 1 milliGRAM(s) Oral at bedtime PRN for anxiety  aluminum hydroxide/magnesium hydroxide/simethicone Suspension 30 milliLiter(s) Oral every 6 hours PRN Dyspepsia  dextrose Oral Gel 15 Gram(s) Oral once PRN Blood Glucose LESS THAN 70 milliGRAM(s)/deciliter  HYDROmorphone  Injectable 0.5 milliGRAM(s) IV Push every 2 hours PRN Moderate Pain (4 - 6)  HYDROmorphone  Injectable 1 milliGRAM(s) IV Push every 4 hours PRN Severe Pain (7 - 10)  ondansetron Injectable 4 milliGRAM(s) IV Push every 8 hours PRN Nausea and/or Vomiting  saline laxative (FLEET) Rectal Enema 1 Enema Rectal daily PRN constipation  senna 2 Tablet(s) Oral at bedtime PRN Constipation  simethicone 80 milliGRAM(s) Chew daily PRN Gas      Allergies    No Known Allergies    Intolerances        REVIEW OF SYSTEMS:  Please see interval HPI:    Vital Signs Last 24 Hrs  T(C): 36.4 (07 Jan 2024 05:12), Max: 36.8 (06 Jan 2024 11:35)  T(F): 97.6 (07 Jan 2024 05:12), Max: 98.2 (06 Jan 2024 11:35)  HR: 66 (07 Jan 2024 05:12) (54 - 66)  BP: 134/82 (07 Jan 2024 05:12) (134/82 - 167/80)  BP(mean): --  RR: 18 (07 Jan 2024 05:12) (18 - 18)  SpO2: 95% (07 Jan 2024 05:12) (95% - 100%)    Parameters below as of 07 Jan 2024 05:12  Patient On (Oxygen Delivery Method): room air      I&O's Detail    06 Jan 2024 07:01  -  07 Jan 2024 07:00  --------------------------------------------------------  IN:    Lactated Ringers: 2400 mL  Total IN: 2400 mL    OUT:    Voided (mL): 850 mL  Total OUT: 850 mL    Total NET: 1550 mL            PHYSICAL EXAM:  Vital Signs Last 24 Hrs  T(C): 36.4 (07 Jan 2024 05:12), Max: 36.8 (06 Jan 2024 11:35)  T(F): 97.6 (07 Jan 2024 05:12), Max: 98.2 (06 Jan 2024 11:35)  HR: 66 (07 Jan 2024 05:12) (54 - 66)  BP: 134/82 (07 Jan 2024 05:12) (134/82 - 167/80)  BP(mean): --  RR: 18 (07 Jan 2024 05:12) (18 - 18)  SpO2: 95% (07 Jan 2024 05:12) (95% - 100%)    Parameters below as of 07 Jan 2024 05:12  Patient On (Oxygen Delivery Method): room air      CONSTITUTIONAL: NAD,   ENMT: Moist oral mucosa,   RESPIRATORY: Normal respiratory effort; lungs are clear to auscultation bilaterally  CARDIOVASCULAR: Regular rate and rhythm, normal S1 and S2, no murmur/rub/gallop; No lower extremity edema;   ABDOMEN: Nontender to palpation, normoactive bowel sounds, no rebound/guarding; No hepatosplenomegaly  EXT: no edema b/l  NEUROLOGY: alert, following commands  SKIN: No rashes; no palpable lesions      LABS:                        14.6   8.82  )-----------( 226      ( 07 Jan 2024 05:58 )             43.4     07 Jan 2024 05:58    139    |  101    |  6      ----------------------------<  67     4.1     |  23     |  0.73     Ca    9.0        07 Jan 2024 05:58  Phos  2.7       07 Jan 2024 05:58  Mg     2.10      07 Jan 2024 05:58    TPro  6.7    /  Alb  4.1    /  TBili  3.2    /  DBili  x      /  AST  293    /  ALT  290    /  AlkPhos  192    07 Jan 2024 05:58      CAPILLARY BLOOD GLUCOSE      POCT Blood Glucose.: 71 mg/dL (07 Jan 2024 08:29)  POCT Blood Glucose.: 85 mg/dL (07 Jan 2024 06:02)  POCT Blood Glucose.: 103 mg/dL (06 Jan 2024 23:35)  POCT Blood Glucose.: 89 mg/dL (06 Jan 2024 22:28)  POCT Blood Glucose.: 86 mg/dL (06 Jan 2024 18:07)  POCT Blood Glucose.: 97 mg/dL (06 Jan 2024 13:09)    BLOOD CULTURE    RADIOLOGY & ADDITIONAL TESTS:    Imaging Personally Reviewed:  [ ] YES     Consultant(s) Notes Reviewed:      Care Discussed with Consultants/Other Providers: Tammy Orellana MD   [facility] Medicine  Teams preferred  Pager: [phone]    Patient is a 48y old  Male who presents with a chief complaint of Acute pancreatitis (06 Jan 2024 17:03)      INTERVAL HPI/OVERNIGHT EVENTS: yesterday Pt did have a bowel movement, 2 small    MEDICATIONS  (STANDING):  chlorhexidine 2% Cloths 1 Application(s) Topical <User Schedule>  dextrose 5%. 1000 milliLiter(s) (100 mL/Hr) IV Continuous <Continuous>  dextrose 5%. 1000 milliLiter(s) (50 mL/Hr) IV Continuous <Continuous>  dextrose 50% Injectable 25 Gram(s) IV Push once  dextrose 50% Injectable 12.5 Gram(s) IV Push once  dextrose 50% Injectable 25 Gram(s) IV Push once  enoxaparin Injectable 40 milliGRAM(s) SubCutaneous every 24 hours  famotidine Injectable 20 milliGRAM(s) IV Push every 12 hours  glucagon  Injectable 1 milliGRAM(s) IntraMuscular once  insulin lispro (ADMELOG) corrective regimen sliding scale   SubCutaneous every 6 hours  ketorolac   Injectable 15 milliGRAM(s) IV Push every 6 hours  lactated ringers. 1000 milliLiter(s) (150 mL/Hr) IV Continuous <Continuous>  polyethylene glycol 3350 17 Gram(s) Oral daily    MEDICATIONS  (PRN):  acetaminophen     Tablet .. 650 milliGRAM(s) Oral every 6 hours PRN Temp greater or equal to 38C (100.4F), Mild Pain (1 - 3), Moderate Pain (4 - 6), Severe Pain (7 - 10)  ALPRAZolam 1 milliGRAM(s) Oral at bedtime PRN for anxiety  aluminum hydroxide/magnesium hydroxide/simethicone Suspension 30 milliLiter(s) Oral every 6 hours PRN Dyspepsia  dextrose Oral Gel 15 Gram(s) Oral once PRN Blood Glucose LESS THAN 70 milliGRAM(s)/deciliter  HYDROmorphone  Injectable 0.5 milliGRAM(s) IV Push every 2 hours PRN Moderate Pain (4 - 6)  HYDROmorphone  Injectable 1 milliGRAM(s) IV Push every 4 hours PRN Severe Pain (7 - 10)  ondansetron Injectable 4 milliGRAM(s) IV Push every 8 hours PRN Nausea and/or Vomiting  saline laxative (FLEET) Rectal Enema 1 Enema Rectal daily PRN constipation  senna 2 Tablet(s) Oral at bedtime PRN Constipation  simethicone 80 milliGRAM(s) Chew daily PRN Gas      Allergies    No Known Allergies    Intolerances        REVIEW OF SYSTEMS:  Please see interval HPI:    Vital Signs Last 24 Hrs  T(C): 36.4 (07 Jan 2024 05:12), Max: 36.8 (06 Jan 2024 11:35)  T(F): 97.6 (07 Jan 2024 05:12), Max: 98.2 (06 Jan 2024 11:35)  HR: 66 (07 Jan 2024 05:12) (54 - 66)  BP: 134/82 (07 Jan 2024 05:12) (134/82 - 167/80)  BP(mean): --  RR: 18 (07 Jan 2024 05:12) (18 - 18)  SpO2: 95% (07 Jan 2024 05:12) (95% - 100%)    Parameters below as of 07 Jan 2024 05:12  Patient On (Oxygen Delivery Method): room air      I&O's Detail    06 Jan 2024 07:01  -  07 Jan 2024 07:00  --------------------------------------------------------  IN:    Lactated Ringers: 2400 mL  Total IN: 2400 mL    OUT:    Voided (mL): 850 mL  Total OUT: 850 mL    Total NET: 1550 mL            PHYSICAL EXAM:  Vital Signs Last 24 Hrs  T(C): 36.4 (07 Jan 2024 05:12), Max: 36.8 (06 Jan 2024 11:35)  T(F): 97.6 (07 Jan 2024 05:12), Max: 98.2 (06 Jan 2024 11:35)  HR: 66 (07 Jan 2024 05:12) (54 - 66)  BP: 134/82 (07 Jan 2024 05:12) (134/82 - 167/80)  BP(mean): --  RR: 18 (07 Jan 2024 05:12) (18 - 18)  SpO2: 95% (07 Jan 2024 05:12) (95% - 100%)    Parameters below as of 07 Jan 2024 05:12  Patient On (Oxygen Delivery Method): room air      CONSTITUTIONAL: NAD,   ENMT: Moist oral mucosa,   RESPIRATORY: Normal respiratory effort; lungs are clear to auscultation bilaterally  CARDIOVASCULAR: Regular rate and rhythm, normal S1 and S2, no murmur/rub/gallop; No lower extremity edema;   ABDOMEN: Nontender to palpation, normoactive bowel sounds, no rebound/guarding; No hepatosplenomegaly  EXT: no edema b/l  NEUROLOGY: alert, following commands  SKIN: No rashes; no palpable lesions      LABS:                        14.6   8.82  )-----------( 226      ( 07 Jan 2024 05:58 )             43.4     07 Jan 2024 05:58    139    |  101    |  6      ----------------------------<  67     4.1     |  23     |  0.73     Ca    9.0        07 Jan 2024 05:58  Phos  2.7       07 Jan 2024 05:58  Mg     2.10      07 Jan 2024 05:58    TPro  6.7    /  Alb  4.1    /  TBili  3.2    /  DBili  x      /  AST  293    /  ALT  290    /  AlkPhos  192    07 Jan 2024 05:58      CAPILLARY BLOOD GLUCOSE      POCT Blood Glucose.: 71 mg/dL (07 Jan 2024 08:29)  POCT Blood Glucose.: 85 mg/dL (07 Jan 2024 06:02)  POCT Blood Glucose.: 103 mg/dL (06 Jan 2024 23:35)  POCT Blood Glucose.: 89 mg/dL (06 Jan 2024 22:28)  POCT Blood Glucose.: 86 mg/dL (06 Jan 2024 18:07)  POCT Blood Glucose.: 97 mg/dL (06 Jan 2024 13:09)    BLOOD CULTURE    RADIOLOGY & ADDITIONAL TESTS:    Imaging Personally Reviewed:  [ ] YES     Consultant(s) Notes Reviewed:      Care Discussed with Consultants/Other Providers: Tammy Orellana MD   [facility] Medicine  Teams preferred  Pager: [phone]    Patient is a 48y old  Male who presents with a chief complaint of Acute pancreatitis (06 Jan 2024 17:03)      INTERVAL HPI/OVERNIGHT EVENTS: yesterday Pt did have a bowel movement, 2 small BMs. passed a lot of gas. was able to sleep overnight and meds are lasting a little longer.     MEDICATIONS  (STANDING):  chlorhexidine 2% Cloths 1 Application(s) Topical <User Schedule>  dextrose 5%. 1000 milliLiter(s) (100 mL/Hr) IV Continuous <Continuous>  dextrose 5%. 1000 milliLiter(s) (50 mL/Hr) IV Continuous <Continuous>  dextrose 50% Injectable 25 Gram(s) IV Push once  dextrose 50% Injectable 12.5 Gram(s) IV Push once  dextrose 50% Injectable 25 Gram(s) IV Push once  enoxaparin Injectable 40 milliGRAM(s) SubCutaneous every 24 hours  famotidine Injectable 20 milliGRAM(s) IV Push every 12 hours  glucagon  Injectable 1 milliGRAM(s) IntraMuscular once  insulin lispro (ADMELOG) corrective regimen sliding scale   SubCutaneous every 6 hours  ketorolac   Injectable 15 milliGRAM(s) IV Push every 6 hours  lactated ringers. 1000 milliLiter(s) (150 mL/Hr) IV Continuous <Continuous>  polyethylene glycol 3350 17 Gram(s) Oral daily    MEDICATIONS  (PRN):  acetaminophen     Tablet .. 650 milliGRAM(s) Oral every 6 hours PRN Temp greater or equal to 38C (100.4F), Mild Pain (1 - 3), Moderate Pain (4 - 6), Severe Pain (7 - 10)  ALPRAZolam 1 milliGRAM(s) Oral at bedtime PRN for anxiety  aluminum hydroxide/magnesium hydroxide/simethicone Suspension 30 milliLiter(s) Oral every 6 hours PRN Dyspepsia  dextrose Oral Gel 15 Gram(s) Oral once PRN Blood Glucose LESS THAN 70 milliGRAM(s)/deciliter  HYDROmorphone  Injectable 0.5 milliGRAM(s) IV Push every 2 hours PRN Moderate Pain (4 - 6)  HYDROmorphone  Injectable 1 milliGRAM(s) IV Push every 4 hours PRN Severe Pain (7 - 10)  ondansetron Injectable 4 milliGRAM(s) IV Push every 8 hours PRN Nausea and/or Vomiting  saline laxative (FLEET) Rectal Enema 1 Enema Rectal daily PRN constipation  senna 2 Tablet(s) Oral at bedtime PRN Constipation  simethicone 80 milliGRAM(s) Chew daily PRN Gas      Allergies    No Known Allergies    Intolerances        REVIEW OF SYSTEMS:  Please see interval HPI:    Vital Signs Last 24 Hrs  T(C): 36.4 (07 Jan 2024 05:12), Max: 36.8 (06 Jan 2024 11:35)  T(F): 97.6 (07 Jan 2024 05:12), Max: 98.2 (06 Jan 2024 11:35)  HR: 66 (07 Jan 2024 05:12) (54 - 66)  BP: 134/82 (07 Jan 2024 05:12) (134/82 - 167/80)  BP(mean): --  RR: 18 (07 Jan 2024 05:12) (18 - 18)  SpO2: 95% (07 Jan 2024 05:12) (95% - 100%)    Parameters below as of 07 Jan 2024 05:12  Patient On (Oxygen Delivery Method): room air      I&O's Detail    06 Jan 2024 07:01  -  07 Jan 2024 07:00  --------------------------------------------------------  IN:    Lactated Ringers: 2400 mL  Total IN: 2400 mL    OUT:    Voided (mL): 850 mL  Total OUT: 850 mL    Total NET: 1550 mL            PHYSICAL EXAM:  Vital Signs Last 24 Hrs  T(C): 36.4 (07 Jan 2024 05:12), Max: 36.8 (06 Jan 2024 11:35)  T(F): 97.6 (07 Jan 2024 05:12), Max: 98.2 (06 Jan 2024 11:35)  HR: 66 (07 Jan 2024 05:12) (54 - 66)  BP: 134/82 (07 Jan 2024 05:12) (134/82 - 167/80)  BP(mean): --  RR: 18 (07 Jan 2024 05:12) (18 - 18)  SpO2: 95% (07 Jan 2024 05:12) (95% - 100%)    Parameters below as of 07 Jan 2024 05:12  Patient On (Oxygen Delivery Method): room air      CONSTITUTIONAL: NAD,   ENMT: Moist oral mucosa,   RESPIRATORY: Normal respiratory effort; lungs are clear to auscultation bilaterally  CARDIOVASCULAR: Regular rate and rhythm, normal S1 and S2, no murmur/rub/gallop; No lower extremity edema;   ABDOMEN: +mild lower quadrant tenderness in LLQ, normoactive bowel sounds, no rebound/guarding; No hepatosplenomegaly  EXT: no edema b/l  NEUROLOGY: alert, following commands  SKIN: No rashes; no palpable lesions      LABS:                        14.6   8.82  )-----------( 226      ( 07 Jan 2024 05:58 )             43.4     07 Jan 2024 05:58    139    |  101    |  6      ----------------------------<  67     4.1     |  23     |  0.73     Ca    9.0        07 Jan 2024 05:58  Phos  2.7       07 Jan 2024 05:58  Mg     2.10      07 Jan 2024 05:58    TPro  6.7    /  Alb  4.1    /  TBili  3.2    /  DBili  x      /  AST  293    /  ALT  290    /  AlkPhos  192    07 Jan 2024 05:58      CAPILLARY BLOOD GLUCOSE      POCT Blood Glucose.: 71 mg/dL (07 Jan 2024 08:29)  POCT Blood Glucose.: 85 mg/dL (07 Jan 2024 06:02)  POCT Blood Glucose.: 103 mg/dL (06 Jan 2024 23:35)  POCT Blood Glucose.: 89 mg/dL (06 Jan 2024 22:28)  POCT Blood Glucose.: 86 mg/dL (06 Jan 2024 18:07)  POCT Blood Glucose.: 97 mg/dL (06 Jan 2024 13:09)    BLOOD CULTURE    RADIOLOGY & ADDITIONAL TESTS:    Imaging Personally Reviewed:  [ ] YES     Consultant(s) Notes Reviewed:      Care Discussed with Consultants/Other Providers: Tammy Orellana MD   [facility] Medicine  Teams preferred  Pager: [phone]    Patient is a 48y old  Male who presents with a chief complaint of Acute pancreatitis (06 Jan 2024 17:03)      INTERVAL HPI/OVERNIGHT EVENTS: yesterday Pt did have a bowel movement, 2 small BMs. passed a lot of gas. was able to sleep overnight and meds are lasting a little longer.     MEDICATIONS  (STANDING):  chlorhexidine 2% Cloths 1 Application(s) Topical <User Schedule>  dextrose 5%. 1000 milliLiter(s) (100 mL/Hr) IV Continuous <Continuous>  dextrose 5%. 1000 milliLiter(s) (50 mL/Hr) IV Continuous <Continuous>  dextrose 50% Injectable 25 Gram(s) IV Push once  dextrose 50% Injectable 12.5 Gram(s) IV Push once  dextrose 50% Injectable 25 Gram(s) IV Push once  enoxaparin Injectable 40 milliGRAM(s) SubCutaneous every 24 hours  famotidine Injectable 20 milliGRAM(s) IV Push every 12 hours  glucagon  Injectable 1 milliGRAM(s) IntraMuscular once  insulin lispro (ADMELOG) corrective regimen sliding scale   SubCutaneous every 6 hours  ketorolac   Injectable 15 milliGRAM(s) IV Push every 6 hours  lactated ringers. 1000 milliLiter(s) (150 mL/Hr) IV Continuous <Continuous>  polyethylene glycol 3350 17 Gram(s) Oral daily    MEDICATIONS  (PRN):  acetaminophen     Tablet .. 650 milliGRAM(s) Oral every 6 hours PRN Temp greater or equal to 38C (100.4F), Mild Pain (1 - 3), Moderate Pain (4 - 6), Severe Pain (7 - 10)  ALPRAZolam 1 milliGRAM(s) Oral at bedtime PRN for anxiety  aluminum hydroxide/magnesium hydroxide/simethicone Suspension 30 milliLiter(s) Oral every 6 hours PRN Dyspepsia  dextrose Oral Gel 15 Gram(s) Oral once PRN Blood Glucose LESS THAN 70 milliGRAM(s)/deciliter  HYDROmorphone  Injectable 0.5 milliGRAM(s) IV Push every 2 hours PRN Moderate Pain (4 - 6)  HYDROmorphone  Injectable 1 milliGRAM(s) IV Push every 4 hours PRN Severe Pain (7 - 10)  ondansetron Injectable 4 milliGRAM(s) IV Push every 8 hours PRN Nausea and/or Vomiting  saline laxative (FLEET) Rectal Enema 1 Enema Rectal daily PRN constipation  senna 2 Tablet(s) Oral at bedtime PRN Constipation  simethicone 80 milliGRAM(s) Chew daily PRN Gas      Allergies    No Known Allergies    Intolerances        REVIEW OF SYSTEMS:  Please see interval HPI:    Vital Signs Last 24 Hrs  T(C): 36.4 (07 Jan 2024 05:12), Max: 36.8 (06 Jan 2024 11:35)  T(F): 97.6 (07 Jan 2024 05:12), Max: 98.2 (06 Jan 2024 11:35)  HR: 66 (07 Jan 2024 05:12) (54 - 66)  BP: 134/82 (07 Jan 2024 05:12) (134/82 - 167/80)  BP(mean): --  RR: 18 (07 Jan 2024 05:12) (18 - 18)  SpO2: 95% (07 Jan 2024 05:12) (95% - 100%)    Parameters below as of 07 Jan 2024 05:12  Patient On (Oxygen Delivery Method): room air      I&O's Detail    06 Jan 2024 07:01  -  07 Jan 2024 07:00  --------------------------------------------------------  IN:    Lactated Ringers: 2400 mL  Total IN: 2400 mL    OUT:    Voided (mL): 850 mL  Total OUT: 850 mL    Total NET: 1550 mL            PHYSICAL EXAM:  Vital Signs Last 24 Hrs  T(C): 36.4 (07 Jan 2024 05:12), Max: 36.8 (06 Jan 2024 11:35)  T(F): 97.6 (07 Jan 2024 05:12), Max: 98.2 (06 Jan 2024 11:35)  HR: 66 (07 Jan 2024 05:12) (54 - 66)  BP: 134/82 (07 Jan 2024 05:12) (134/82 - 167/80)  BP(mean): --  RR: 18 (07 Jan 2024 05:12) (18 - 18)  SpO2: 95% (07 Jan 2024 05:12) (95% - 100%)    Parameters below as of 07 Jan 2024 05:12  Patient On (Oxygen Delivery Method): room air      CONSTITUTIONAL: NAD,   ENMT: Moist oral mucosa,   RESPIRATORY: Normal respiratory effort; lungs are clear to auscultation bilaterally  CARDIOVASCULAR: Regular rate and rhythm, normal S1 and S2, no murmur/rub/gallop; No lower extremity edema;   ABDOMEN: +mild lower quadrant tenderness in LLQ, normoactive bowel sounds, no rebound/guarding; No hepatosplenomegaly  EXT: no edema b/l  NEUROLOGY: alert, following commands  SKIN: No rashes; no palpable lesions      LABS:                        14.6   8.82  )-----------( 226      ( 07 Jan 2024 05:58 )             43.4     07 Jan 2024 05:58    139    |  101    |  6      ----------------------------<  67     4.1     |  23     |  0.73     Ca    9.0        07 Jan 2024 05:58  Phos  2.7       07 Jan 2024 05:58  Mg     2.10      07 Jan 2024 05:58    TPro  6.7    /  Alb  4.1    /  TBili  3.2    /  DBili  x      /  AST  293    /  ALT  290    /  AlkPhos  192    07 Jan 2024 05:58      CAPILLARY BLOOD GLUCOSE      POCT Blood Glucose.: 71 mg/dL (07 Jan 2024 08:29)  POCT Blood Glucose.: 85 mg/dL (07 Jan 2024 06:02)  POCT Blood Glucose.: 103 mg/dL (06 Jan 2024 23:35)  POCT Blood Glucose.: 89 mg/dL (06 Jan 2024 22:28)  POCT Blood Glucose.: 86 mg/dL (06 Jan 2024 18:07)  POCT Blood Glucose.: 97 mg/dL (06 Jan 2024 13:09)    BLOOD CULTURE    RADIOLOGY & ADDITIONAL TESTS:    Imaging Personally Reviewed:  [ ] YES     Consultant(s) Notes Reviewed:      Care Discussed with Consultants/Other Providers:

## 2024-01-07 NOTE — DISCHARGE NOTE PROVIDER - NSDCMRMEDTOKEN_GEN_ALL_CORE_FT
metFORMIN 500 mg oral tablet, extended release: 1 tab(s) orally once a day  Xanax 1 mg oral tablet: 1 tab(s) orally once a day (at bedtime) as needed for  anxiety   ibuprofen 400 mg oral tablet: 1 tab(s) orally every 6 hours As needed Mild Pain (1 - 3), Moderate Pain (4 - 6), Severe Pain (7 - 10)  polyethylene glycol 3350 oral powder for reconstitution: 17 gram(s) orally once a day  Protonix 40 mg oral delayed release tablet: 1 tab(s) orally 2 times a day  repaglinide 0.5 mg oral tablet: 1 tab(s) orally 2 times a day (before meals) until GI follow up and when cleared to resume metformin  senna leaf extract oral tablet: 2 tab(s) orally once a day (at bedtime) As needed Constipation  simethicone 80 mg oral tablet, chewable: 1 tab(s) orally once a day as needed for Gas  Xanax 1 mg oral tablet: 1 tab(s) orally once a day (at bedtime) as needed for  anxiety

## 2024-01-07 NOTE — PROGRESS NOTE ADULT - PROBLEM SELECTOR PLAN 4
Pt with marked sinus dorian to 47 bpm on admission EKG. Pt HD stable and asymptomatic from bradycardia. Likely near pt's baseline resting HR, as pt noted to have HR in low 60s at [place].   - No AV blocks noted on admission EKG  - WNL  - Monitor VS q4hrs Pt with marked sinus dorian to 47 bpm on admission EKG. Pt HD stable and asymptomatic from bradycardia. Likely near pt's baseline resting HR, as pt noted to have HR in low 60s at [place].   - No AV blocks noted on admission EKG  - WNL  - Monitor VS q4hrs

## 2024-01-07 NOTE — DISCHARGE NOTE PROVIDER - NPI NUMBER (FOR SYSADMIN USE ONLY) :
[[id number] [[id number]] [[id number]],[[id number]],[[id number]] [[id number]],[UNKNOWN]

## 2024-01-07 NOTE — DISCHARGE NOTE PROVIDER - DETAILS OF MALNUTRITION DIAGNOSIS/DIAGNOSES
This patient has been assessed with a concern for Malnutrition and was treated during this hospitalization for the following Nutrition diagnosis/diagnoses:     -  01/05/2024: Severe protein-calorie malnutrition

## 2024-01-07 NOTE — PROGRESS NOTE ADULT - NUTRITIONAL ASSESSMENT
This patient has been assessed with a concern for Malnutrition and has been determined to have a diagnosis/diagnoses of Severe protein-calorie malnutrition.    This patient is being managed with:   Diet Clear Liquid-  Consistent Carbohydrate {Evening Snack} (CSTCHOSN)  Entered: Jan 7 2024 10:16AM

## 2024-01-07 NOTE — PROGRESS NOTE ADULT - PROBLEM SELECTOR PLAN 3
WBC 12.91 on admission. Pt did not meet SIRS criteria, however. Leukocytosis likely in setting of acute pancreatitis. Pt afebrile.  -resolved   - Monitor off antibiotics for now

## 2024-01-07 NOTE — DISCHARGE NOTE PROVIDER - NSDCCPCAREPLAN_GEN_ALL_CORE_FT
PRINCIPAL DISCHARGE DIAGNOSIS  Diagnosis: Pancreatitis  Assessment and Plan of Treatment: secondary to stone in Pancreatic duct. Removed at ERCP 1/9/24. Please call Gasteonterology office for follow up. You will need repeat ERCP in 3 months to remove stent.

## 2024-01-07 NOTE — CHART NOTE - NSCHARTNOTEFT_GEN_A_CORE
Patient evaluated; abdominal pain significantly improved compared to yesterday; drank water yesterday without worsening of pain; no nausea or vomiting. Labs today showing rising liver chemistry with TB elevation of 3.2, , ,  in setting of known ampullary stricture or severe inflammation of the periampullary duodenal wall 2/2 pancreatitis. Recommend MRCP for further evaluation, trend CMP, monitor hemodynamics/temperature curve. If new leukocytosis or fever, please start empiric antibiotics. Keep NPO after midnight for possible EUS/ERCP pending discussion with advanced GI team tomorrow. Discussed with GI attending on call.    Karine Allen MD  GI/Hepatology Fellow, PGY4  Teams preferred (7AM to 5PM); after 5PM, call GI fellow on call    NEW CONSULTS:  Please email [email or web address] Patient evaluated; abdominal pain significantly improved compared to yesterday; drank water yesterday without worsening of pain; no nausea or vomiting. Labs today showing rising liver chemistry with TB elevation of 3.2, , ,  in setting of known ampullary stricture or severe inflammation of the periampullary duodenal wall 2/2 pancreatitis. Recommend MRCP for further evaluation, trend CMP, monitor hemodynamics/temperature curve. If new leukocytosis or fever, please start empiric antibiotics. Keep NPO after midnight for possible EUS/ERCP pending discussion with advanced GI team tomorrow. Discussed with GI attending on call.    Karine Allen MD  GI/Hepatology Fellow, PGY4  Teams preferred (7AM to 5PM); after 5PM, call GI fellow on call    NEW CONSULTS:  Please email [email or web address] Patient evaluated; abdominal pain significantly improved compared to yesterday; drank water yesterday without worsening of pain; no nausea or vomiting. Labs today showing rising liver chemistry with TB elevation of 3.2, , ,  in setting of known PD dilation 2/2 ampullary stricture or severe inflammation of the periampullary duodenal wall 2/2 pancreatitis. Recommend MRCP for further evaluation, trend CMP, monitor hemodynamics/temperature curve. If new leukocytosis or fever, please start empiric antibiotics. Keep NPO after midnight for possible EUS/ERCP pending discussion with advanced GI team tomorrow. Discussed with GI attending on call.    Karine Allen MD  GI/Hepatology Fellow, PGY4  Teams preferred (7AM to 5PM); after 5PM, call GI fellow on call    NEW CONSULTS:  Please email [email or web address] Patient evaluated; abdominal pain significantly improved compared to yesterday; drank water yesterday without worsening of pain; no nausea or vomiting. Labs today showing rising liver chemistry with TB elevation of 3.2, , ,  in setting of known PD dilation 2/2 ampullary stricture or severe inflammation of the periampullary duodenal wall 2/2 pancreatitis. Recommend MRCP for further evaluation, trend CMP, monitor hemodynamics/temperature curve. If new leukocytosis or fever, please start empiric antibiotics. Keep NPO after midnight for possible EUS/ERCP pending discussion with advanced GI team tomorrow. Discussed with GI attending on call.    Karine Allen MD  GI/Hepatology Fellow, PGY4  Teams preferred (7AM to 5PM); after 5PM, call GI fellow on call    NEW CONSULTS:  Please email [email or web address] Patient evaluated; abdominal pain significantly improved compared to yesterday; drank water yesterday without worsening of pain; no nausea or vomiting. Labs today showing rising liver chemistry with TB elevation of 3.2, , ,  in setting of known PD dilation 2/2 ampullary stricture or severe inflammation of the periampullary duodenal wall 2/2 pancreatitis. Given clinical improvement in abdominal pain today and tolerating liquids, would recommend MRCP for further evaluation, trend CMP, monitor hemodynamics/temperature curve. If new leukocytosis or fever, please start empiric antibiotics. Keep NPO after midnight for possible EUS/ERCP pending discussion with advanced GI team tomorrow. Discussed with GI attending on call.    Karine Allen MD  GI/Hepatology Fellow, PGY4  Teams preferred (7AM to 5PM); after 5PM, call GI fellow on call    NEW CONSULTS:  Please email [email or web address] Patient evaluated; abdominal pain significantly improved compared to yesterday; drank water yesterday without worsening of pain; no nausea or vomiting. Labs today showing rising liver chemistry with TB elevation of 3.2, , ,  in setting of known PD dilation 2/2 ampullary stricture or severe inflammation of the periampullary duodenal wall 2/2 pancreatitis. Given clinical improvement in abdominal pain today and tolerating liquids, would recommend MRCP for further evaluation, trend CMP, monitor hemodynamics/temperature curve. If new leukocytosis or fever, please start empiric antibiotics. Keep NPO after midnight for possible EUS/ERCP pending discussion with advanced GI team tomorrow. Discussed with GI attending on call.    Karine Allen MD  GI/Hepatology Fellow, PGY4  Teams preferred (7AM to 5PM); after 5PM, call GI fellow on call    NEW CONSULTS:  Please email [email or web address]

## 2024-01-07 NOTE — PROGRESS NOTE ADULT - ASSESSMENT
47 yo man with history of recurrent pancreatitis, ?asthma/COPD (not on home O2), rheumatoid arthritis (not on any meds),  type 2 DM (not on insulin), anxiety, and alcohol use disorder (sober for last ~20 years) presents with worsening epigastric pain that started shortly after pt was discharged from [facility] on 12/19/23, admitted with acute pancreatitis. Pain syndrome and elevated lipase is consistent.  47 yo man with history of recurrent pancreatitis, ?asthma/COPD (not on home O2), rheumatoid arthritis (not on any meds),  type 2 DM (not on insulin), anxiety, and alcohol use disorder (sober for last ~20 years) presents with worsening epigastric pain that started shortly after pt was discharged from [facility] on 12/19/23, admitted with acute pancreatitis. Pain syndrome and elevated lipase is consistent.

## 2024-01-07 NOTE — DISCHARGE NOTE PROVIDER - CARE PROVIDER_API CALL
KAVON BRYANT, KOLBY  [address], [place] [address]  Phone: [phone]  Fax: [phone]  Established Patient  Follow Up Time: 1 week    Jeff Holliday  Gastroenterology  [address] [place] [id number]  Phone: [phone]  Fax: [phone]  Established Patient  Follow Up Time: 2 weeks   KAVON BRYANT, KOLBY  [address] [address]  Phone: [phone]  Fax: [phone]  Established Patient  Follow Up Time: 1 week    Jeff Holliday  Gastroenterology  [address] [place] [address]  Phone: [phone]  Fax: [phone]  Established Patient  Follow Up Time: 2 weeks   KAVON BRYANT, KOLBY  [address], [place] [address]  Phone: [phone]  Fax: [phone]  Established Patient  Follow Up Time: 1 week    Jeff Holliday  Gastroenterology  [address] [place] [address]  Phone: [phone]  Fax: [phone]  Follow Up Time: 2 months    Gastroenterology Clinic,   Phone: [phone]  Fax: (   )    -  Follow Up Time: 2 weeks   KAVON BRYANT, KOLBY  [address] [address]  Phone: [phone]  Fax: [phone]  Established Patient  Follow Up Time: 1 week    Jeff Holliday  Gastroenterology  [address] [place] [address]  Phone: [phone]  Fax: [phone]  Follow Up Time: 2 months    Gastroenterology Clinic,   Phone: [phone]  Fax: (   )    -  Follow Up Time: 2 weeks

## 2024-01-08 LAB
ALBUMIN SERPL ELPH-MCNC: 3.8 G/DL — SIGNIFICANT CHANGE UP (ref 3.3–5)
ALBUMIN SERPL ELPH-MCNC: 3.8 G/DL — SIGNIFICANT CHANGE UP (ref 3.3–5)
ALP SERPL-CCNC: 246 U/L — HIGH (ref 40–120)
ALP SERPL-CCNC: 246 U/L — HIGH (ref 40–120)
ALT FLD-CCNC: 519 U/L — HIGH (ref 4–41)
ALT FLD-CCNC: 519 U/L — HIGH (ref 4–41)
ANION GAP SERPL CALC-SCNC: 19 MMOL/L — HIGH (ref 7–14)
ANION GAP SERPL CALC-SCNC: 19 MMOL/L — HIGH (ref 7–14)
AST SERPL-CCNC: 397 U/L — HIGH (ref 4–40)
AST SERPL-CCNC: 397 U/L — HIGH (ref 4–40)
BILIRUB DIRECT SERPL-MCNC: 4.6 MG/DL — HIGH (ref 0–0.3)
BILIRUB DIRECT SERPL-MCNC: 4.6 MG/DL — HIGH (ref 0–0.3)
BILIRUB SERPL-MCNC: 5.3 MG/DL — HIGH (ref 0.2–1.2)
BILIRUB SERPL-MCNC: 5.3 MG/DL — HIGH (ref 0.2–1.2)
BUN SERPL-MCNC: 6 MG/DL — LOW (ref 7–23)
BUN SERPL-MCNC: 6 MG/DL — LOW (ref 7–23)
CALCIUM SERPL-MCNC: 9.2 MG/DL — SIGNIFICANT CHANGE UP (ref 8.4–10.5)
CALCIUM SERPL-MCNC: 9.2 MG/DL — SIGNIFICANT CHANGE UP (ref 8.4–10.5)
CHLORIDE SERPL-SCNC: 102 MMOL/L — SIGNIFICANT CHANGE UP (ref 98–107)
CHLORIDE SERPL-SCNC: 102 MMOL/L — SIGNIFICANT CHANGE UP (ref 98–107)
CO2 SERPL-SCNC: 18 MMOL/L — LOW (ref 22–31)
CO2 SERPL-SCNC: 18 MMOL/L — LOW (ref 22–31)
CREAT SERPL-MCNC: 0.71 MG/DL — SIGNIFICANT CHANGE UP (ref 0.5–1.3)
CREAT SERPL-MCNC: 0.71 MG/DL — SIGNIFICANT CHANGE UP (ref 0.5–1.3)
EGFR: 112 ML/MIN/1.73M2 — SIGNIFICANT CHANGE UP
EGFR: 112 ML/MIN/1.73M2 — SIGNIFICANT CHANGE UP
GLUCOSE BLDC GLUCOMTR-MCNC: 101 MG/DL — HIGH (ref 70–99)
GLUCOSE BLDC GLUCOMTR-MCNC: 101 MG/DL — HIGH (ref 70–99)
GLUCOSE BLDC GLUCOMTR-MCNC: 122 MG/DL — HIGH (ref 70–99)
GLUCOSE BLDC GLUCOMTR-MCNC: 122 MG/DL — HIGH (ref 70–99)
GLUCOSE BLDC GLUCOMTR-MCNC: 76 MG/DL — SIGNIFICANT CHANGE UP (ref 70–99)
GLUCOSE BLDC GLUCOMTR-MCNC: 76 MG/DL — SIGNIFICANT CHANGE UP (ref 70–99)
GLUCOSE BLDC GLUCOMTR-MCNC: 82 MG/DL — SIGNIFICANT CHANGE UP (ref 70–99)
GLUCOSE BLDC GLUCOMTR-MCNC: 82 MG/DL — SIGNIFICANT CHANGE UP (ref 70–99)
GLUCOSE BLDC GLUCOMTR-MCNC: 83 MG/DL — SIGNIFICANT CHANGE UP (ref 70–99)
GLUCOSE BLDC GLUCOMTR-MCNC: 83 MG/DL — SIGNIFICANT CHANGE UP (ref 70–99)
GLUCOSE SERPL-MCNC: 68 MG/DL — LOW (ref 70–99)
GLUCOSE SERPL-MCNC: 68 MG/DL — LOW (ref 70–99)
HCT VFR BLD CALC: 43.6 % — SIGNIFICANT CHANGE UP (ref 39–50)
HCT VFR BLD CALC: 43.6 % — SIGNIFICANT CHANGE UP (ref 39–50)
HGB BLD-MCNC: 14.7 G/DL — SIGNIFICANT CHANGE UP (ref 13–17)
HGB BLD-MCNC: 14.7 G/DL — SIGNIFICANT CHANGE UP (ref 13–17)
MAGNESIUM SERPL-MCNC: 2.1 MG/DL — SIGNIFICANT CHANGE UP (ref 1.6–2.6)
MAGNESIUM SERPL-MCNC: 2.1 MG/DL — SIGNIFICANT CHANGE UP (ref 1.6–2.6)
MCHC RBC-ENTMCNC: 27.6 PG — SIGNIFICANT CHANGE UP (ref 27–34)
MCHC RBC-ENTMCNC: 27.6 PG — SIGNIFICANT CHANGE UP (ref 27–34)
MCHC RBC-ENTMCNC: 33.7 GM/DL — SIGNIFICANT CHANGE UP (ref 32–36)
MCHC RBC-ENTMCNC: 33.7 GM/DL — SIGNIFICANT CHANGE UP (ref 32–36)
MCV RBC AUTO: 82 FL — SIGNIFICANT CHANGE UP (ref 80–100)
MCV RBC AUTO: 82 FL — SIGNIFICANT CHANGE UP (ref 80–100)
NRBC # BLD: 0 /100 WBCS — SIGNIFICANT CHANGE UP (ref 0–0)
NRBC # BLD: 0 /100 WBCS — SIGNIFICANT CHANGE UP (ref 0–0)
NRBC # FLD: 0 K/UL — SIGNIFICANT CHANGE UP (ref 0–0)
NRBC # FLD: 0 K/UL — SIGNIFICANT CHANGE UP (ref 0–0)
PHOSPHATE SERPL-MCNC: 2.1 MG/DL — LOW (ref 2.5–4.5)
PHOSPHATE SERPL-MCNC: 2.1 MG/DL — LOW (ref 2.5–4.5)
PLATELET # BLD AUTO: 217 K/UL — SIGNIFICANT CHANGE UP (ref 150–400)
PLATELET # BLD AUTO: 217 K/UL — SIGNIFICANT CHANGE UP (ref 150–400)
POTASSIUM SERPL-MCNC: 3.8 MMOL/L — SIGNIFICANT CHANGE UP (ref 3.5–5.3)
POTASSIUM SERPL-MCNC: 3.8 MMOL/L — SIGNIFICANT CHANGE UP (ref 3.5–5.3)
POTASSIUM SERPL-SCNC: 3.8 MMOL/L — SIGNIFICANT CHANGE UP (ref 3.5–5.3)
POTASSIUM SERPL-SCNC: 3.8 MMOL/L — SIGNIFICANT CHANGE UP (ref 3.5–5.3)
PROT SERPL-MCNC: 6.6 G/DL — SIGNIFICANT CHANGE UP (ref 6–8.3)
PROT SERPL-MCNC: 6.6 G/DL — SIGNIFICANT CHANGE UP (ref 6–8.3)
RBC # BLD: 5.32 M/UL — SIGNIFICANT CHANGE UP (ref 4.2–5.8)
RBC # BLD: 5.32 M/UL — SIGNIFICANT CHANGE UP (ref 4.2–5.8)
RBC # FLD: 13.5 % — SIGNIFICANT CHANGE UP (ref 10.3–14.5)
RBC # FLD: 13.5 % — SIGNIFICANT CHANGE UP (ref 10.3–14.5)
SODIUM SERPL-SCNC: 139 MMOL/L — SIGNIFICANT CHANGE UP (ref 135–145)
SODIUM SERPL-SCNC: 139 MMOL/L — SIGNIFICANT CHANGE UP (ref 135–145)
WBC # BLD: 7.81 K/UL — SIGNIFICANT CHANGE UP (ref 3.8–10.5)
WBC # BLD: 7.81 K/UL — SIGNIFICANT CHANGE UP (ref 3.8–10.5)
WBC # FLD AUTO: 7.81 K/UL — SIGNIFICANT CHANGE UP (ref 3.8–10.5)
WBC # FLD AUTO: 7.81 K/UL — SIGNIFICANT CHANGE UP (ref 3.8–10.5)

## 2024-01-08 PROCEDURE — 99233 SBSQ HOSP IP/OBS HIGH 50: CPT

## 2024-01-08 RX ORDER — HYDROMORPHONE HYDROCHLORIDE 2 MG/ML
1 INJECTION INTRAMUSCULAR; INTRAVENOUS; SUBCUTANEOUS ONCE
Refills: 0 | Status: DISCONTINUED | OUTPATIENT
Start: 2024-01-08 | End: 2024-01-10

## 2024-01-08 RX ORDER — SODIUM CHLORIDE 9 MG/ML
1000 INJECTION, SOLUTION INTRAVENOUS
Refills: 0 | Status: DISCONTINUED | OUTPATIENT
Start: 2024-01-08 | End: 2024-01-11

## 2024-01-08 RX ORDER — SODIUM CHLORIDE 9 MG/ML
1000 INJECTION, SOLUTION INTRAVENOUS
Refills: 0 | Status: DISCONTINUED | OUTPATIENT
Start: 2024-01-08 | End: 2024-01-09

## 2024-01-08 RX ORDER — SODIUM CHLORIDE 9 MG/ML
1000 INJECTION, SOLUTION INTRAVENOUS
Refills: 0 | Status: DISCONTINUED | OUTPATIENT
Start: 2024-01-08 | End: 2024-01-08

## 2024-01-08 RX ADMIN — HYDROMORPHONE HYDROCHLORIDE 1 MILLIGRAM(S): 2 INJECTION INTRAMUSCULAR; INTRAVENOUS; SUBCUTANEOUS at 00:12

## 2024-01-08 RX ADMIN — CHLORHEXIDINE GLUCONATE 1 APPLICATION(S): 213 SOLUTION TOPICAL at 05:27

## 2024-01-08 RX ADMIN — HYDROMORPHONE HYDROCHLORIDE 1 MILLIGRAM(S): 2 INJECTION INTRAMUSCULAR; INTRAVENOUS; SUBCUTANEOUS at 19:33

## 2024-01-08 RX ADMIN — HYDROMORPHONE HYDROCHLORIDE 1 MILLIGRAM(S): 2 INJECTION INTRAMUSCULAR; INTRAVENOUS; SUBCUTANEOUS at 00:27

## 2024-01-08 RX ADMIN — HYDROMORPHONE HYDROCHLORIDE 1 MILLIGRAM(S): 2 INJECTION INTRAMUSCULAR; INTRAVENOUS; SUBCUTANEOUS at 05:22

## 2024-01-08 RX ADMIN — Medication 15 MILLIGRAM(S): at 21:53

## 2024-01-08 RX ADMIN — HYDROMORPHONE HYDROCHLORIDE 1 MILLIGRAM(S): 2 INJECTION INTRAMUSCULAR; INTRAVENOUS; SUBCUTANEOUS at 05:37

## 2024-01-08 RX ADMIN — MUPIROCIN 1 APPLICATION(S): 20 OINTMENT TOPICAL at 17:54

## 2024-01-08 RX ADMIN — HYDROMORPHONE HYDROCHLORIDE 1 MILLIGRAM(S): 2 INJECTION INTRAMUSCULAR; INTRAVENOUS; SUBCUTANEOUS at 10:45

## 2024-01-08 RX ADMIN — Medication 15 MILLIGRAM(S): at 02:06

## 2024-01-08 RX ADMIN — HYDROMORPHONE HYDROCHLORIDE 1 MILLIGRAM(S): 2 INJECTION INTRAMUSCULAR; INTRAVENOUS; SUBCUTANEOUS at 23:00

## 2024-01-08 RX ADMIN — HYDROMORPHONE HYDROCHLORIDE 1 MILLIGRAM(S): 2 INJECTION INTRAMUSCULAR; INTRAVENOUS; SUBCUTANEOUS at 16:04

## 2024-01-08 RX ADMIN — SODIUM CHLORIDE 150 MILLILITER(S): 9 INJECTION, SOLUTION INTRAVENOUS at 10:31

## 2024-01-08 RX ADMIN — HYDROMORPHONE HYDROCHLORIDE 1 MILLIGRAM(S): 2 INJECTION INTRAMUSCULAR; INTRAVENOUS; SUBCUTANEOUS at 16:19

## 2024-01-08 RX ADMIN — Medication 15 MILLIGRAM(S): at 02:21

## 2024-01-08 RX ADMIN — HYDROMORPHONE HYDROCHLORIDE 1 MILLIGRAM(S): 2 INJECTION INTRAMUSCULAR; INTRAVENOUS; SUBCUTANEOUS at 22:41

## 2024-01-08 RX ADMIN — FAMOTIDINE 20 MILLIGRAM(S): 10 INJECTION INTRAVENOUS at 05:22

## 2024-01-08 RX ADMIN — SODIUM CHLORIDE 80 MILLILITER(S): 9 INJECTION, SOLUTION INTRAVENOUS at 17:52

## 2024-01-08 RX ADMIN — Medication 15 MILLIGRAM(S): at 14:15

## 2024-01-08 RX ADMIN — HYDROMORPHONE HYDROCHLORIDE 1 MILLIGRAM(S): 2 INJECTION INTRAMUSCULAR; INTRAVENOUS; SUBCUTANEOUS at 10:30

## 2024-01-08 RX ADMIN — SODIUM CHLORIDE 150 MILLILITER(S): 9 INJECTION, SOLUTION INTRAVENOUS at 08:17

## 2024-01-08 RX ADMIN — Medication 15 MILLIGRAM(S): at 08:15

## 2024-01-08 RX ADMIN — MUPIROCIN 1 APPLICATION(S): 20 OINTMENT TOPICAL at 05:20

## 2024-01-08 RX ADMIN — FAMOTIDINE 20 MILLIGRAM(S): 10 INJECTION INTRAVENOUS at 17:50

## 2024-01-08 RX ADMIN — SODIUM CHLORIDE 150 MILLILITER(S): 9 INJECTION, SOLUTION INTRAVENOUS at 16:01

## 2024-01-08 RX ADMIN — HYDROMORPHONE HYDROCHLORIDE 1 MILLIGRAM(S): 2 INJECTION INTRAMUSCULAR; INTRAVENOUS; SUBCUTANEOUS at 19:48

## 2024-01-08 NOTE — PROGRESS NOTE ADULT - SUBJECTIVE AND OBJECTIVE BOX
Chief Complaint:  Patient is a 48y old  Male who presents with a chief complaint of Acute pancreatitis (07 Jan 2024 21:52)    Interval Events:   worsening abd pain yesterday with PO intake; now improved; no nausea/vomiting  2 small BM yesterday; passing flatus    Allergies:  No Known Allergies        Hospital Medications:  ALPRAZolam 1 milliGRAM(s) Oral at bedtime PRN  aluminum hydroxide/magnesium hydroxide/simethicone Suspension 30 milliLiter(s) Oral every 6 hours PRN  chlorhexidine 2% Cloths 1 Application(s) Topical <User Schedule>  dextrose 5%. 1000 milliLiter(s) IV Continuous <Continuous>  dextrose 5%. 1000 milliLiter(s) IV Continuous <Continuous>  dextrose 50% Injectable 25 Gram(s) IV Push once  dextrose 50% Injectable 12.5 Gram(s) IV Push once  dextrose 50% Injectable 25 Gram(s) IV Push once  dextrose Oral Gel 15 Gram(s) Oral once PRN  enoxaparin Injectable 40 milliGRAM(s) SubCutaneous every 24 hours  famotidine Injectable 20 milliGRAM(s) IV Push every 12 hours  glucagon  Injectable 1 milliGRAM(s) IntraMuscular once  HYDROmorphone  Injectable 1 milliGRAM(s) IV Push every 3 hours PRN  insulin lispro (ADMELOG) corrective regimen sliding scale   SubCutaneous every 6 hours  ketorolac   Injectable 15 milliGRAM(s) IV Push every 6 hours  lactated ringers. 1000 milliLiter(s) IV Continuous <Continuous>  mupirocin 2% Ointment 1 Application(s) Topical every 12 hours  ondansetron Injectable 4 milliGRAM(s) IV Push every 8 hours PRN  pantoprazole  Injectable 40 milliGRAM(s) IV Push once  polyethylene glycol 3350 17 Gram(s) Oral daily  saline laxative (FLEET) Rectal Enema 1 Enema Rectal daily PRN  senna 2 Tablet(s) Oral at bedtime PRN  simethicone 80 milliGRAM(s) Chew daily PRN      PMHX/PSHX:  Acute Pancreatitis    Anxiety    Asthma    DM (diabetes mellitus)    Anxiety    COPD with asthma    Rheumatoid arthritis    History of alcohol use disorder    History of Cholecystectomy        Family history:  Family history of diabetes mellitus    Family history of breast cancer    FH: colon cancer    No family history of COPD    FH: COPD (chronic obstructive pulmonary disease)    FH: breast cancer        PHYSICAL EXAM:   Vital Signs:  Vital Signs Last 24 Hrs  T(C): 36.4 (08 Jan 2024 05:55), Max: 36.6 (07 Jan 2024 12:06)  T(F): 97.5 (08 Jan 2024 05:55), Max: 97.8 (07 Jan 2024 12:06)  HR: 71 (08 Jan 2024 05:55) (64 - 71)  BP: 151/80 (08 Jan 2024 05:55) (129/73 - 189/80)  BP(mean): --  RR: 17 (08 Jan 2024 05:55) (16 - 17)  SpO2: 98% (08 Jan 2024 05:55) (95% - 100%)    Parameters below as of 08 Jan 2024 05:55  Patient On (Oxygen Delivery Method): room air      Daily     Daily     GENERAL:  No acute distress  HEENT:  no scleral icterus  CHEST:  no accessory muscle use  HEART:  Regular rate and rhythm  ABDOMEN:  Soft, upper abdominal tenderness, non-distended  EXTREMITIES:  No edema  SKIN:  No rash/ecchymoses  NEURO:  Alert and oriented x 3    LABS:                        14.7   7.81  )-----------( 217      ( 08 Jan 2024 05:30 )             43.6     Mean Cell Volume: 82.0 fL (01-08-24 @ 05:30)    01-08    139  |  102  |  6<L>  ----------------------------<  68<L>  3.8   |  18<L>  |  0.71    Ca    9.2      08 Jan 2024 05:30  Phos  2.1     01-08  Mg     2.10     01-08    TPro  6.6  /  Alb  3.8  /  TBili  5.3<H>  /  DBili  x   /  AST  397<H>  /  ALT  519<H>  /  AlkPhos  246<H>  01-08    LIVER FUNCTIONS - ( 08 Jan 2024 05:30 )  Alb: 3.8 g/dL / Pro: 6.6 g/dL / ALK PHOS: 246 U/L / ALT: 519 U/L / AST: 397 U/L / GGT: x             Urinalysis Basic - ( 08 Jan 2024 05:30 )    Color: x / Appearance: x / SG: x / pH: x  Gluc: 68 mg/dL / Ketone: x  / Bili: x / Urobili: x   Blood: x / Protein: x / Nitrite: x   Leuk Esterase: x / RBC: x / WBC x   Sq Epi: x / Non Sq Epi: x / Bacteria: x                              14.7   7.81  )-----------( 217      ( 08 Jan 2024 05:30 )             43.6                         14.6   8.82  )-----------( 226      ( 07 Jan 2024 05:58 )             43.4                         14.0   11.98 )-----------( 201      ( 06 Jan 2024 05:40 )             42.0                         14.4   11.38 )-----------( 219      ( 05 Jan 2024 14:29 )             42.7

## 2024-01-08 NOTE — PROGRESS NOTE ADULT - PROBLEM SELECTOR PROBLEM 9
History of alcohol use disorder Prednisone Counseling:  I discussed with the patient the risks of prolonged use of prednisone including but not limited to weight gain, insomnia, osteoporosis, mood changes, diabetes, susceptibility to infection, glaucoma and high blood pressure.  In cases where prednisone use is prolonged, patients should be monitored with blood pressure checks, serum glucose levels and an eye exam.  Additionally, the patient may need to be placed on GI prophylaxis, PCP prophylaxis, and calcium and vitamin D supplementation and/or a bisphosphonate.  The patient verbalized understanding of the proper use and the possible adverse effects of prednisone.  All of the patient's questions and concerns were addressed.

## 2024-01-08 NOTE — PROGRESS NOTE ADULT - PROBLEM SELECTOR PLAN 4
Pt with marked sinus dorian to 47 bpm on admission EKG. Pt HD stable and asymptomatic from bradycardia. Likely near pt's baseline resting HR, as pt noted to have HR in low 60s at [place].   - No AV blocks noted on admission EKG  - WNL  - Monitor VS q4hrs    1/9 HR 71, Observe Pt with marked sinus dorian to 47 bpm on admission EKG. Pt HD stable and asymptomatic from bradycardia. Likely near pt's baseline resting HR, as pt noted to have HR in low 60s at [place].   - No AV blocks noted on admission EKG  - WNL  - Monitor VS q4hrs    1/9 HR 71, Observe

## 2024-01-08 NOTE — PROGRESS NOTE ADULT - ASSESSMENT
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 or severe inflammation of the periampullary duodenal wall 2/2 pancreatitis  Labs today showing rising liver chemistry with TB elevation of 3.2, , ,   - recurrent episodes for decades; previous triggers alcohol (sober for 20 years), methotrexate (off for 5 years), and gallstone (s/p cholecystectomy 2009); TG normal; IGG4 normal  - recently admitted for [facility] with supportive care (IVF, pain control, anti-emetics) with improvement in symptoms for about 2 days before recurrent symptoms  - lipase increasing from previous admission 700s to 1200s now  - CT 1/4 with mild interval worsening of acute pancreatitis with new pancreatic ductal dilatation to level of the ampulla. Ductal dilatation could be due to an ampullary stricture or severe inflammation of the periampullary duodenal wall; discussed with advanced GI team; suspect findings 2/2 acute inflammation 2/2 pancreatitis; suspect findings will improve with treatment of underlying pancreatitis  - US 12/18, CBD 6mm; normal TB    #T2DM; not on SGLT2  #RA; not on any medications    Recommendations  - MRCP to evaluate biliary tree and area of ampullary stenosis; performing ERCP right now is less ideal given ongoing pancreatitis and suspect obstructive disease 2/2 inflammation; pending labs and imaging, may discuss again with advanced GI team  - follow up CMP today  - keep NPO for now  - continue miralax if able to tolerate  - out of bed and ambulating as tolerating  - continue IVF; currently on 200cc/h; monitor UOP  - continue pain control and anti-emetics  - out of bed as tolerated  - EUS as outpatient after acute inflammation is resolved to further evaluation etiology of recurrent episodes    Note and recommendations are incomplete until reviewed and attested by attending.    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 or severe inflammation of the periampullary duodenal wall 2/2 pancreatitis  Labs today showing rising liver chemistry with TB elevation of 3.2, , ,   - recurrent episodes for decades; previous triggers alcohol (sober for 20 years), methotrexate (off for 5 years), and gallstone (s/p cholecystectomy 2009); TG normal; IGG4 normal  - recently admitted for LIJFH with supportive care (IVF, pain control, anti-emetics) with improvement in symptoms for about 2 days before recurrent symptoms  - lipase increasing from previous admission 700s to 1200s now  - CT 1/4 with mild interval worsening of acute pancreatitis with new pancreatic ductal dilatation to level of the ampulla. Ductal dilatation could be due to an ampullary stricture or severe inflammation of the periampullary duodenal wall; discussed with advanced GI team; suspect findings 2/2 acute inflammation 2/2 pancreatitis; suspect findings will improve with treatment of underlying pancreatitis  - US 12/18, CBD 6mm; normal TB    #T2DM; not on SGLT2  #RA; not on any medications    Recommendations  - MRCP to evaluate biliary tree and area of ampullary stenosis; performing ERCP right now is less ideal given ongoing pancreatitis and suspect obstructive disease 2/2 inflammation; pending labs and imaging, may discuss again with advanced GI team  - follow up CMP today  - keep NPO for now  - continue miralax if able to tolerate  - out of bed and ambulating as tolerating  - continue IVF; currently on 200cc/h; monitor UOP  - continue pain control and anti-emetics  - out of bed as tolerated  - EUS as outpatient after acute inflammation is resolved to further evaluation etiology of recurrent episodes    Note and recommendations are incomplete until reviewed and attested by attending.    Karine Allen MD  GI/Hepatology Fellow, PGY4  Teams preferred (7AM to 5PM); after 5PM, call GI fellow on call    NEW CONSULTS:  Please email 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 or severe inflammation of the periampullary duodenal wall 2/2 pancreatitis  Labs today showing rising liver chemistry with TB elevation of 3.2, , ,   - recurrent episodes for decades; previous triggers alcohol (sober for 20 years), methotrexate (off for 5 years), and gallstone (s/p cholecystectomy 2009); TG normal; IGG4 normal  - recently admitted for LIJFH with supportive care (IVF, pain control, anti-emetics) with improvement in symptoms for about 2 days before recurrent symptoms  - lipase increasing from previous admission 700s to 1200s now  - CT 1/4 with mild interval worsening of acute pancreatitis with new pancreatic ductal dilatation to level of the ampulla. Ductal dilatation could be due to an ampullary stricture or severe inflammation of the periampullary duodenal wall; discussed with advanced GI team; suspect findings 2/2 acute inflammation 2/2 pancreatitis; suspect findings will improve with treatment of underlying pancreatitis  - US 12/18, CBD 6mm; normal TB    #T2DM; not on SGLT2  #RA; not on any medications    Recommendations  - MRCP to evaluate biliary tree and area of ampullary stenosis; performing ERCP right now is less ideal given ongoing pancreatitis and suspect obstructive disease 2/2 inflammation; pending labs and imaging, may discuss again with advanced GI team  - follow up CMP today  - if any fevers or leukocytosis, start empiric antibiotics  - keep NPO for now  - continue miralax if able to tolerate  - out of bed and ambulating as tolerating  - continue IVF; currently on 200cc/h; monitor UOP  - continue pain control and anti-emetics  - out of bed as tolerated  - EUS as outpatient after acute inflammation is resolved to further evaluation etiology of recurrent episodes    Note and recommendations are incomplete until reviewed and attested by attending.    Karine Allen MD  GI/Hepatology Fellow, PGY4  Teams preferred (7AM to 5PM); after 5PM, call GI fellow on call    NEW CONSULTS:  Please email 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 or severe inflammation of the periampullary duodenal wall 2/2 pancreatitis  Labs today showing rising liver chemistry with TB elevation of 3.2, , ,   - recurrent episodes for decades; previous triggers alcohol (sober for 20 years), methotrexate (off for 5 years), and gallstone (s/p cholecystectomy 2009); TG normal; IGG4 normal  - recently admitted for LIJFH with supportive care (IVF, pain control, anti-emetics) with improvement in symptoms for about 2 days before recurrent symptoms  - lipase increasing from previous admission 700s to 1200s now  - CT 1/4 with mild interval worsening of acute pancreatitis with new pancreatic ductal dilatation to level of the ampulla. Ductal dilatation could be due to an ampullary stricture or severe inflammation of the periampullary duodenal wall; discussed with advanced GI team; suspect findings 2/2 acute inflammation 2/2 pancreatitis; suspect findings will improve with treatment of underlying pancreatitis  - US 12/18, CBD 6mm; normal TB    #T2DM; not on SGLT2  #RA; not on any medications    Recommendations  - MRCP to evaluate biliary tree and area of ampullary stenosis; performing ERCP right now is less ideal given ongoing pancreatitis and suspect obstructive disease 2/2 inflammation; pending labs and imaging, may discuss again with advanced GI team  - follow up CMP today  - if any fevers or leukocytosis, start empiric antibiotics  - keep NPO for now  - continue miralax if able to tolerate  - out of bed and ambulating as tolerating  - continue IVF; currently on 200cc/h; monitor UOP  - continue pain control and anti-emetics  - out of bed as tolerated  - EUS as outpatient after acute inflammation is resolved to further evaluation etiology of recurrent episodes    Note and recommendations are incomplete until reviewed and attested by attending.    Karine Allen MD  GI/Hepatology Fellow, PGY4  Teams preferred (7AM to 5PM); after 5PM, call GI fellow on call    NEW CONSULTS:  Please email 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 or severe inflammation of the periampullary duodenal wall 2/2 pancreatitis  Labs today showing rising liver chemistry with TB elevation of 3.2, , ,   - recurrent episodes for decades; previous triggers alcohol (sober for 20 years), methotrexate (off for 5 years), and gallstone (s/p cholecystectomy 2009); TG normal; IGG4 normal  - recently admitted for LIJFH with supportive care (IVF, pain control, anti-emetics) with improvement in symptoms for about 2 days before recurrent symptoms  - lipase increasing from previous admission 700s to 1200s now  - CT 1/4 with mild interval worsening of acute pancreatitis with new pancreatic ductal dilatation to level of the ampulla. Ductal dilatation could be due to an ampullary stricture or severe inflammation of the periampullary duodenal wall; discussed with advanced GI team; suspect findings 2/2 acute inflammation 2/2 pancreatitis; suspect findings will improve with treatment of underlying pancreatitis  - US 12/18, CBD 6mm; normal TB    #T2DM; not on SGLT2  #RA; not on any medications    Recommendations  - plan for EUS today or tomorrow given worsening liver chemistry with PD dilation and ampullary stricture vs. inflammation  - keep NPO  - if any fevers or leukocytosis, start empiric antibiotics  - continue miralax if able to tolerate  - out of bed and ambulating as tolerating  - continue IVF; currently on 200cc/h; monitor UOP  - continue pain control and anti-emetics  - out of bed as tolerated    Note and recommendations are incomplete until reviewed and attested by attending.    Karine Allen MD  GI/Hepatology Fellow, PGY4  Teams preferred (7AM to 5PM); after 5PM, call GI fellow on call    NEW CONSULTS:  Please email 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 or severe inflammation of the periampullary duodenal wall 2/2 pancreatitis  Labs today showing rising liver chemistry with TB elevation of 3.2, , ,   - recurrent episodes for decades; previous triggers alcohol (sober for 20 years), methotrexate (off for 5 years), and gallstone (s/p cholecystectomy 2009); TG normal; IGG4 normal  - recently admitted for LIJFH with supportive care (IVF, pain control, anti-emetics) with improvement in symptoms for about 2 days before recurrent symptoms  - lipase increasing from previous admission 700s to 1200s now  - CT 1/4 with mild interval worsening of acute pancreatitis with new pancreatic ductal dilatation to level of the ampulla. Ductal dilatation could be due to an ampullary stricture or severe inflammation of the periampullary duodenal wall; discussed with advanced GI team; suspect findings 2/2 acute inflammation 2/2 pancreatitis; suspect findings will improve with treatment of underlying pancreatitis  - US 12/18, CBD 6mm; normal TB    #T2DM; not on SGLT2  #RA; not on any medications    Recommendations  - plan for EUS today or tomorrow given worsening liver chemistry with PD dilation and ampullary stricture vs. inflammation  - keep NPO  - if any fevers or leukocytosis, start empiric antibiotics  - continue miralax if able to tolerate  - out of bed and ambulating as tolerating  - continue IVF; currently on 200cc/h; monitor UOP  - continue pain control and anti-emetics  - out of bed as tolerated    Note and recommendations are incomplete until reviewed and attested by attending.    Karine Allen MD  GI/Hepatology Fellow, PGY4  Teams preferred (7AM to 5PM); after 5PM, call GI fellow on call    NEW CONSULTS:  Please email 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 or severe inflammation of the periampullary duodenal wall 2/2 pancreatitis  Labs today showing rising liver chemistry with TB elevation of 3.2, , ,   - recurrent episodes for decades; previous triggers alcohol (sober for 20 years), methotrexate (off for 5 years), and gallstone (s/p cholecystectomy 2009); TG normal; IGG4 normal  - recently admitted for LIJFH with supportive care (IVF, pain control, anti-emetics) with improvement in symptoms for about 2 days before recurrent symptoms  - lipase increasing from previous admission 700s to 1200s now  - CT 1/4 with mild interval worsening of acute pancreatitis with new pancreatic ductal dilatation to level of the ampulla. Ductal dilatation could be due to an ampullary stricture or severe inflammation of the periampullary duodenal wall; discussed with advanced GI team; suspect findings 2/2 acute inflammation 2/2 pancreatitis; suspect findings will improve with treatment of underlying pancreatitis  - US 12/18, CBD 6mm; normal TB    #T2DM; not on SGLT2  #RA; not on any medications    Recommendations  - Tentatively plan for EUS today pending scheduling, given worsening liver chemistry with PD dilation and ampullary stricture vs. inflammation; if unable to do today due to scheduling, please make NPO after midnight and check 3 AM labs  - keep NPO  - if any fevers or leukocytosis, start empiric antibiotics  - continue miralax if able to tolerate  - out of bed and ambulating as tolerating  - continue IVF; currently on 200cc/h; monitor UOP  - continue pain control and anti-emetics  - out of bed as tolerated    Note and recommendations are incomplete until reviewed and attested by attending.    Karine Allen MD  GI/Hepatology Fellow, PGY4  Teams preferred (7AM to 5PM); after 5PM, call GI fellow on call    NEW CONSULTS:  Please email 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 or severe inflammation of the periampullary duodenal wall 2/2 pancreatitis  Labs today showing rising liver chemistry with TB elevation of 3.2, , ,   - recurrent episodes for decades; previous triggers alcohol (sober for 20 years), methotrexate (off for 5 years), and gallstone (s/p cholecystectomy 2009); TG normal; IGG4 normal  - recently admitted for LIJFH with supportive care (IVF, pain control, anti-emetics) with improvement in symptoms for about 2 days before recurrent symptoms  - lipase increasing from previous admission 700s to 1200s now  - CT 1/4 with mild interval worsening of acute pancreatitis with new pancreatic ductal dilatation to level of the ampulla. Ductal dilatation could be due to an ampullary stricture or severe inflammation of the periampullary duodenal wall; discussed with advanced GI team; suspect findings 2/2 acute inflammation 2/2 pancreatitis; suspect findings will improve with treatment of underlying pancreatitis  - US 12/18, CBD 6mm; normal TB    #T2DM; not on SGLT2  #RA; not on any medications    Recommendations  - Tentatively plan for EUS today pending scheduling, given worsening liver chemistry with PD dilation and ampullary stricture vs. inflammation; if unable to do today due to scheduling, please make NPO after midnight and check 3 AM labs  - keep NPO  - if any fevers or leukocytosis, start empiric antibiotics  - continue miralax if able to tolerate  - out of bed and ambulating as tolerating  - continue IVF; currently on 200cc/h; monitor UOP  - continue pain control and anti-emetics  - out of bed as tolerated    Note and recommendations are incomplete until reviewed and attested by attending.    Karine Allen MD  GI/Hepatology Fellow, PGY4  Teams preferred (7AM to 5PM); after 5PM, call GI fellow on call    NEW CONSULTS:  Please email [email or web address]

## 2024-01-08 NOTE — PROGRESS NOTE ADULT - NUTRITIONAL ASSESSMENT
This patient has been assessed with a concern for Malnutrition and has been determined to have a diagnosis/diagnoses of Severe protein-calorie malnutrition.    This patient is being managed with:   Diet NPO-  Entered: Jan 7 2024  3:46PM

## 2024-01-08 NOTE — PROGRESS NOTE ADULT - SUBJECTIVE AND OBJECTIVE BOX
Pending MRCP Internal Medicine accept note  Patient is a 48y old  Male who presents with a chief complaint of Acute pancreatitis (08 Jan 2024 09:14)      SUBJECTIVE / OVERNIGHT EVENTS:  Resting in bed. still has pain .  Req pain medicatin prior to MRI--> MRI still pending    MEDICATIONS  (STANDING):  chlorhexidine 2% Cloths 1 Application(s) Topical <User Schedule>  dextrose 5%. 1000 milliLiter(s) (100 mL/Hr) IV Continuous <Continuous>  dextrose 5%. 1000 milliLiter(s) (50 mL/Hr) IV Continuous <Continuous>  dextrose 50% Injectable 25 Gram(s) IV Push once  dextrose 50% Injectable 25 Gram(s) IV Push once  dextrose 50% Injectable 12.5 Gram(s) IV Push once  enoxaparin Injectable 40 milliGRAM(s) SubCutaneous every 24 hours  famotidine Injectable 20 milliGRAM(s) IV Push every 12 hours  glucagon  Injectable 1 milliGRAM(s) IntraMuscular once  insulin lispro (ADMELOG) corrective regimen sliding scale   SubCutaneous every 6 hours  ketorolac   Injectable 15 milliGRAM(s) IV Push every 6 hours  lactated ringers. 1000 milliLiter(s) (150 mL/Hr) IV Continuous <Continuous>  mupirocin 2% Ointment 1 Application(s) Topical every 12 hours  pantoprazole  Injectable 40 milliGRAM(s) IV Push once  polyethylene glycol 3350 17 Gram(s) Oral daily    MEDICATIONS  (PRN):  ALPRAZolam 1 milliGRAM(s) Oral at bedtime PRN for anxiety  aluminum hydroxide/magnesium hydroxide/simethicone Suspension 30 milliLiter(s) Oral every 6 hours PRN Dyspepsia  dextrose Oral Gel 15 Gram(s) Oral once PRN Blood Glucose LESS THAN 70 milliGRAM(s)/deciliter  HYDROmorphone  Injectable 1 milliGRAM(s) IV Push every 3 hours PRN Severe Pain (7 - 10)  ondansetron Injectable 4 milliGRAM(s) IV Push every 8 hours PRN Nausea and/or Vomiting  saline laxative (FLEET) Rectal Enema 1 Enema Rectal daily PRN constipation  senna 2 Tablet(s) Oral at bedtime PRN Constipation  simethicone 80 milliGRAM(s) Chew daily PRN Gas        CAPILLARY BLOOD GLUCOSE    POCT Blood Glucose.: 82 mg/dL (08 Jan 2024 05:22)  POCT Blood Glucose.: 76 mg/dL (08 Jan 2024 00:11)  POCT Blood Glucose.: 87 mg/dL (07 Jan 2024 18:52)    I&O's Summary    07 Jan 2024 07:01  -  08 Jan 2024 07:00  --------------------------------------------------------  IN: 0 mL / OUT: 950 mL / NET: -950 mL      Vital Signs Last 24 Hrs    T(F): 97.5 (08 Jan 2024 05:55), Max: 97.6 (07 Jan 2024 17:31)  HR: 71 (08 Jan 2024 05:55) (65 - 71)  BP: 151/80 (08 Jan 2024 05:55) (129/73 - 189/80)  RR: 17 (08 Jan 2024 05:55) (17 - 17)  SpO2: 98% (08 Jan 2024 05:55) (95% - 100%) room air      PHYSICAL EXAM:  GENERAL: NAD  HEAD:  Atraumatic, Normocephalic  EYES: EOMI, PERRLA, conjunctiva and sclera clear  NECK: Supple, No JVD  CHEST/LUNG: Clear to auscultation bilaterally; No wheeze  HEART: Regular rate and rhythm; No murmurs, rubs, or gallops  ABDOMEN: Soft, RUQ / epigastric pain, no R/G   Nondistended; Bowel sounds present  EXTREMITIES:  2+ Peripheral Pulses, No clubbing, cyanosis, or edema  PSYCH: AAOx3  NEUROLOGY: non-focal  SKIN: No rashes or lesions    LABS:                        14.7   7.81  )-----------( 217      ( 08 Jan 2024 05:30 )             43.6     01-08    139  |  102  |  6<L>  ----------------------------<  68<L>  3.8   |  18<L>  |  0.71    Ca    9.2      08 Jan 2024 05:30  Phos  2.1     01-08  Mg     2.10     01-08    TPro  6.6  /  Alb  3.8  /  TBili  5.3<H>  /  DBili  x   /  AST  397<H>  /  ALT  519<H>  /  AlkPhos  246<H>  01-08        Care Discussed with Consultants/Other Providers:  Patient and RN    Pending MRCP

## 2024-01-08 NOTE — CHART NOTE - NSCHARTNOTEFT_GEN_A_CORE
01-08    139  |  102  |  6<L>  ----------------------------<  68<L>  3.8   |  18<L>  |  0.71    Ca    9.2      08 Jan 2024 05:30  Phos  2.1     01-08  Mg     2.10     01-08    TPro  6.6  /  Alb  3.8  /  TBili  5.3<H>  /  DBili  4.6<H>  /  AST  397<H>  /  ALT  519<H>  /  AlkPhos  246<H>  01-08    increasing  LFT  Hepatology consult requested  called MRI for help with MRCP    wife updated at 3 pm

## 2024-01-08 NOTE — PROGRESS NOTE ADULT - ASSESSMENT
49 yo man with history of recurrent pancreatitis, ?asthma/COPD (not on home O2), rheumatoid arthritis (not on any meds),  type 2 DM (not on insulin), anxiety, and alcohol use disorder (sober for last ~20 years) presents with worsening epigastric pain that started shortly after pt was discharged from [facility] on 12/19/23, admitted with acute pancreatitis. Pain syndrome and elevated lipase is consistent.  49 yo man with history of recurrent pancreatitis, ?asthma/COPD (not on home O2), rheumatoid arthritis (not on any meds),  type 2 DM (not on insulin), anxiety, and alcohol use disorder (sober for last ~20 years) presents with worsening epigastric pain that started shortly after pt was discharged from [facility] on 12/19/23, admitted with acute pancreatitis. Pain syndrome and elevated lipase is consistent.

## 2024-01-08 NOTE — PROGRESS NOTE ADULT - PROBLEM SELECTOR PLAN 1
Pt presenting with worsening epigastric pain x2 weeks, associated with nausea and poor PO intake. Lipase elevated to 1282. Most likely acute pancreatitis, though unclear etiology. Pt denies any current alcohol use. TG 76 on admission. IgG4 in Dec 2023 wnl. Possibly from nicotine vaping? unclear why Pt regressed/relapsed. Previous CT scan reviewed,  repeat CT does note mildly worsening pancreatitis with narrowing of ampulla, unclear from inflammation vs stricture   - S/p 2L bolus of NS in ED.   - c/w NPO and sips of water and IVF  -repeat CT does note mildly worsening pancreatitis with narrowing of ampulla, unclear from inflammation vs stricture   -change pain regimen to dilaudid 1mg q4h ---> q3   -encourage bowel regimen  -pepcid BID IV BID  - Serial abdominal exams  -appreciate GI input---> wants MRCP---> rd and pending  -f/u repeat labs-LFTs stable

## 2024-01-09 LAB
ALBUMIN SERPL ELPH-MCNC: 3.3 G/DL — SIGNIFICANT CHANGE UP (ref 3.3–5)
ALBUMIN SERPL ELPH-MCNC: 3.3 G/DL — SIGNIFICANT CHANGE UP (ref 3.3–5)
ALP SERPL-CCNC: 275 U/L — HIGH (ref 40–120)
ALP SERPL-CCNC: 275 U/L — HIGH (ref 40–120)
ALT FLD-CCNC: 597 U/L — HIGH (ref 4–41)
ALT FLD-CCNC: 597 U/L — HIGH (ref 4–41)
ANION GAP SERPL CALC-SCNC: 15 MMOL/L — HIGH (ref 7–14)
ANION GAP SERPL CALC-SCNC: 15 MMOL/L — HIGH (ref 7–14)
AST SERPL-CCNC: 433 U/L — HIGH (ref 4–40)
AST SERPL-CCNC: 433 U/L — HIGH (ref 4–40)
BILIRUB SERPL-MCNC: 5.5 MG/DL — HIGH (ref 0.2–1.2)
BILIRUB SERPL-MCNC: 5.5 MG/DL — HIGH (ref 0.2–1.2)
BUN SERPL-MCNC: 6 MG/DL — LOW (ref 7–23)
BUN SERPL-MCNC: 6 MG/DL — LOW (ref 7–23)
CALCIUM SERPL-MCNC: 8.7 MG/DL — SIGNIFICANT CHANGE UP (ref 8.4–10.5)
CALCIUM SERPL-MCNC: 8.7 MG/DL — SIGNIFICANT CHANGE UP (ref 8.4–10.5)
CHLORIDE SERPL-SCNC: 105 MMOL/L — SIGNIFICANT CHANGE UP (ref 98–107)
CHLORIDE SERPL-SCNC: 105 MMOL/L — SIGNIFICANT CHANGE UP (ref 98–107)
CO2 SERPL-SCNC: 17 MMOL/L — LOW (ref 22–31)
CO2 SERPL-SCNC: 17 MMOL/L — LOW (ref 22–31)
CREAT SERPL-MCNC: 0.61 MG/DL — SIGNIFICANT CHANGE UP (ref 0.5–1.3)
CREAT SERPL-MCNC: 0.61 MG/DL — SIGNIFICANT CHANGE UP (ref 0.5–1.3)
EGFR: 117 ML/MIN/1.73M2 — SIGNIFICANT CHANGE UP
EGFR: 117 ML/MIN/1.73M2 — SIGNIFICANT CHANGE UP
GLUCOSE BLDC GLUCOMTR-MCNC: 108 MG/DL — HIGH (ref 70–99)
GLUCOSE BLDC GLUCOMTR-MCNC: 108 MG/DL — HIGH (ref 70–99)
GLUCOSE BLDC GLUCOMTR-MCNC: 119 MG/DL — HIGH (ref 70–99)
GLUCOSE BLDC GLUCOMTR-MCNC: 119 MG/DL — HIGH (ref 70–99)
GLUCOSE BLDC GLUCOMTR-MCNC: 124 MG/DL — HIGH (ref 70–99)
GLUCOSE BLDC GLUCOMTR-MCNC: 124 MG/DL — HIGH (ref 70–99)
GLUCOSE BLDC GLUCOMTR-MCNC: 74 MG/DL — SIGNIFICANT CHANGE UP (ref 70–99)
GLUCOSE BLDC GLUCOMTR-MCNC: 74 MG/DL — SIGNIFICANT CHANGE UP (ref 70–99)
GLUCOSE BLDC GLUCOMTR-MCNC: 82 MG/DL — SIGNIFICANT CHANGE UP (ref 70–99)
GLUCOSE BLDC GLUCOMTR-MCNC: 82 MG/DL — SIGNIFICANT CHANGE UP (ref 70–99)
GLUCOSE BLDC GLUCOMTR-MCNC: 87 MG/DL — SIGNIFICANT CHANGE UP (ref 70–99)
GLUCOSE BLDC GLUCOMTR-MCNC: 87 MG/DL — SIGNIFICANT CHANGE UP (ref 70–99)
GLUCOSE BLDC GLUCOMTR-MCNC: 88 MG/DL — SIGNIFICANT CHANGE UP (ref 70–99)
GLUCOSE BLDC GLUCOMTR-MCNC: 88 MG/DL — SIGNIFICANT CHANGE UP (ref 70–99)
GLUCOSE SERPL-MCNC: 87 MG/DL — SIGNIFICANT CHANGE UP (ref 70–99)
GLUCOSE SERPL-MCNC: 87 MG/DL — SIGNIFICANT CHANGE UP (ref 70–99)
HCT VFR BLD CALC: 42.9 % — SIGNIFICANT CHANGE UP (ref 39–50)
HCT VFR BLD CALC: 42.9 % — SIGNIFICANT CHANGE UP (ref 39–50)
HGB BLD-MCNC: 14.7 G/DL — SIGNIFICANT CHANGE UP (ref 13–17)
HGB BLD-MCNC: 14.7 G/DL — SIGNIFICANT CHANGE UP (ref 13–17)
MAGNESIUM SERPL-MCNC: 1.9 MG/DL — SIGNIFICANT CHANGE UP (ref 1.6–2.6)
MAGNESIUM SERPL-MCNC: 1.9 MG/DL — SIGNIFICANT CHANGE UP (ref 1.6–2.6)
MCHC RBC-ENTMCNC: 27.8 PG — SIGNIFICANT CHANGE UP (ref 27–34)
MCHC RBC-ENTMCNC: 27.8 PG — SIGNIFICANT CHANGE UP (ref 27–34)
MCHC RBC-ENTMCNC: 34.3 GM/DL — SIGNIFICANT CHANGE UP (ref 32–36)
MCHC RBC-ENTMCNC: 34.3 GM/DL — SIGNIFICANT CHANGE UP (ref 32–36)
MCV RBC AUTO: 81.3 FL — SIGNIFICANT CHANGE UP (ref 80–100)
MCV RBC AUTO: 81.3 FL — SIGNIFICANT CHANGE UP (ref 80–100)
NRBC # BLD: 0 /100 WBCS — SIGNIFICANT CHANGE UP (ref 0–0)
NRBC # BLD: 0 /100 WBCS — SIGNIFICANT CHANGE UP (ref 0–0)
NRBC # FLD: 0 K/UL — SIGNIFICANT CHANGE UP (ref 0–0)
NRBC # FLD: 0 K/UL — SIGNIFICANT CHANGE UP (ref 0–0)
PHOSPHATE SERPL-MCNC: 2.2 MG/DL — LOW (ref 2.5–4.5)
PHOSPHATE SERPL-MCNC: 2.2 MG/DL — LOW (ref 2.5–4.5)
PLATELET # BLD AUTO: 192 K/UL — SIGNIFICANT CHANGE UP (ref 150–400)
PLATELET # BLD AUTO: 192 K/UL — SIGNIFICANT CHANGE UP (ref 150–400)
POTASSIUM SERPL-MCNC: 3.9 MMOL/L — SIGNIFICANT CHANGE UP (ref 3.5–5.3)
POTASSIUM SERPL-MCNC: 3.9 MMOL/L — SIGNIFICANT CHANGE UP (ref 3.5–5.3)
POTASSIUM SERPL-SCNC: 3.9 MMOL/L — SIGNIFICANT CHANGE UP (ref 3.5–5.3)
POTASSIUM SERPL-SCNC: 3.9 MMOL/L — SIGNIFICANT CHANGE UP (ref 3.5–5.3)
PROT SERPL-MCNC: 6.2 G/DL — SIGNIFICANT CHANGE UP (ref 6–8.3)
PROT SERPL-MCNC: 6.2 G/DL — SIGNIFICANT CHANGE UP (ref 6–8.3)
RBC # BLD: 5.28 M/UL — SIGNIFICANT CHANGE UP (ref 4.2–5.8)
RBC # BLD: 5.28 M/UL — SIGNIFICANT CHANGE UP (ref 4.2–5.8)
RBC # FLD: 13.5 % — SIGNIFICANT CHANGE UP (ref 10.3–14.5)
RBC # FLD: 13.5 % — SIGNIFICANT CHANGE UP (ref 10.3–14.5)
SODIUM SERPL-SCNC: 137 MMOL/L — SIGNIFICANT CHANGE UP (ref 135–145)
SODIUM SERPL-SCNC: 137 MMOL/L — SIGNIFICANT CHANGE UP (ref 135–145)
WBC # BLD: 7.55 K/UL — SIGNIFICANT CHANGE UP (ref 3.8–10.5)
WBC # BLD: 7.55 K/UL — SIGNIFICANT CHANGE UP (ref 3.8–10.5)
WBC # FLD AUTO: 7.55 K/UL — SIGNIFICANT CHANGE UP (ref 3.8–10.5)
WBC # FLD AUTO: 7.55 K/UL — SIGNIFICANT CHANGE UP (ref 3.8–10.5)

## 2024-01-09 PROCEDURE — 43239 EGD BIOPSY SINGLE/MULTIPLE: CPT | Mod: GC

## 2024-01-09 PROCEDURE — 88342 IMHCHEM/IMCYTCHM 1ST ANTB: CPT | Mod: 26

## 2024-01-09 PROCEDURE — 88305 TISSUE EXAM BY PATHOLOGIST: CPT | Mod: 26

## 2024-01-09 PROCEDURE — 74330 X-RAY BILE/PANC ENDOSCOPY: CPT | Mod: 26,GC

## 2024-01-09 PROCEDURE — 99233 SBSQ HOSP IP/OBS HIGH 50: CPT

## 2024-01-09 PROCEDURE — 43274 ERCP DUCT STENT PLACEMENT: CPT | Mod: GC

## 2024-01-09 PROCEDURE — 43264 ERCP REMOVE DUCT CALCULI: CPT | Mod: GC

## 2024-01-09 PROCEDURE — 43259 EGD US EXAM DUODENUM/JEJUNUM: CPT | Mod: GC

## 2024-01-09 DEVICE — GWIRE JAGTOME REVOLUTION RX 260CM/0.025IN: Type: IMPLANTABLE DEVICE | Status: FUNCTIONAL

## 2024-01-09 DEVICE — STENT PANCREAS CATH PUSH 5FRX170CM: Type: IMPLANTABLE DEVICE | Status: FUNCTIONAL

## 2024-01-09 DEVICE — IMPLANTABLE DEVICE: Type: IMPLANTABLE DEVICE | Status: FUNCTIONAL

## 2024-01-09 DEVICE — STENT BIL ADVANIX 10FRX7CM PRELOADED: Type: IMPLANTABLE DEVICE | Status: FUNCTIONAL

## 2024-01-09 RX ORDER — HYDROMORPHONE HYDROCHLORIDE 2 MG/ML
1.5 INJECTION INTRAMUSCULAR; INTRAVENOUS; SUBCUTANEOUS
Refills: 0 | Status: DISCONTINUED | OUTPATIENT
Start: 2024-01-09 | End: 2024-01-11

## 2024-01-09 RX ORDER — POTASSIUM PHOSPHATE, MONOBASIC POTASSIUM PHOSPHATE, DIBASIC 236; 224 MG/ML; MG/ML
30 INJECTION, SOLUTION INTRAVENOUS ONCE
Refills: 0 | Status: COMPLETED | OUTPATIENT
Start: 2024-01-09 | End: 2024-01-09

## 2024-01-09 RX ORDER — SODIUM CHLORIDE 9 MG/ML
2000 INJECTION, SOLUTION INTRAVENOUS ONCE
Refills: 0 | Status: COMPLETED | OUTPATIENT
Start: 2024-01-09 | End: 2024-01-10

## 2024-01-09 RX ORDER — SODIUM CHLORIDE 9 MG/ML
1000 INJECTION, SOLUTION INTRAVENOUS
Refills: 0 | Status: DISCONTINUED | OUTPATIENT
Start: 2024-01-09 | End: 2024-01-12

## 2024-01-09 RX ORDER — SODIUM CHLORIDE 9 MG/ML
1000 INJECTION, SOLUTION INTRAVENOUS
Refills: 0 | Status: DISCONTINUED | OUTPATIENT
Start: 2024-01-09 | End: 2024-01-09

## 2024-01-09 RX ORDER — HYDROMORPHONE HYDROCHLORIDE 2 MG/ML
0.5 INJECTION INTRAMUSCULAR; INTRAVENOUS; SUBCUTANEOUS ONCE
Refills: 0 | Status: DISCONTINUED | OUTPATIENT
Start: 2024-01-09 | End: 2024-01-09

## 2024-01-09 RX ADMIN — Medication 15 MILLIGRAM(S): at 21:34

## 2024-01-09 RX ADMIN — MUPIROCIN 1 APPLICATION(S): 20 OINTMENT TOPICAL at 21:34

## 2024-01-09 RX ADMIN — FAMOTIDINE 20 MILLIGRAM(S): 10 INJECTION INTRAVENOUS at 05:47

## 2024-01-09 RX ADMIN — HYDROMORPHONE HYDROCHLORIDE 0.5 MILLIGRAM(S): 2 INJECTION INTRAMUSCULAR; INTRAVENOUS; SUBCUTANEOUS at 00:36

## 2024-01-09 RX ADMIN — HYDROMORPHONE HYDROCHLORIDE 1 MILLIGRAM(S): 2 INJECTION INTRAMUSCULAR; INTRAVENOUS; SUBCUTANEOUS at 02:00

## 2024-01-09 RX ADMIN — SODIUM CHLORIDE 75 MILLILITER(S): 9 INJECTION, SOLUTION INTRAVENOUS at 06:58

## 2024-01-09 RX ADMIN — HYDROMORPHONE HYDROCHLORIDE 1.5 MILLIGRAM(S): 2 INJECTION INTRAMUSCULAR; INTRAVENOUS; SUBCUTANEOUS at 15:34

## 2024-01-09 RX ADMIN — HYDROMORPHONE HYDROCHLORIDE 1.5 MILLIGRAM(S): 2 INJECTION INTRAMUSCULAR; INTRAVENOUS; SUBCUTANEOUS at 12:19

## 2024-01-09 RX ADMIN — HYDROMORPHONE HYDROCHLORIDE 1 MILLIGRAM(S): 2 INJECTION INTRAMUSCULAR; INTRAVENOUS; SUBCUTANEOUS at 05:47

## 2024-01-09 RX ADMIN — Medication 15 MILLIGRAM(S): at 08:36

## 2024-01-09 RX ADMIN — HYDROMORPHONE HYDROCHLORIDE 1.5 MILLIGRAM(S): 2 INJECTION INTRAMUSCULAR; INTRAVENOUS; SUBCUTANEOUS at 15:50

## 2024-01-09 RX ADMIN — Medication 15 MILLIGRAM(S): at 03:05

## 2024-01-09 RX ADMIN — SODIUM CHLORIDE 75 MILLILITER(S): 9 INJECTION, SOLUTION INTRAVENOUS at 09:19

## 2024-01-09 RX ADMIN — Medication 15 MILLIGRAM(S): at 09:29

## 2024-01-09 RX ADMIN — HYDROMORPHONE HYDROCHLORIDE 1.5 MILLIGRAM(S): 2 INJECTION INTRAMUSCULAR; INTRAVENOUS; SUBCUTANEOUS at 12:35

## 2024-01-09 RX ADMIN — CHLORHEXIDINE GLUCONATE 1 APPLICATION(S): 213 SOLUTION TOPICAL at 05:48

## 2024-01-09 RX ADMIN — HYDROMORPHONE HYDROCHLORIDE 0.5 MILLIGRAM(S): 2 INJECTION INTRAMUSCULAR; INTRAVENOUS; SUBCUTANEOUS at 00:21

## 2024-01-09 RX ADMIN — HYDROMORPHONE HYDROCHLORIDE 1 MILLIGRAM(S): 2 INJECTION INTRAMUSCULAR; INTRAVENOUS; SUBCUTANEOUS at 09:46

## 2024-01-09 RX ADMIN — SODIUM CHLORIDE 75 MILLILITER(S): 9 INJECTION, SOLUTION INTRAVENOUS at 12:20

## 2024-01-09 RX ADMIN — POTASSIUM PHOSPHATE, MONOBASIC POTASSIUM PHOSPHATE, DIBASIC 83.33 MILLIMOLE(S): 236; 224 INJECTION, SOLUTION INTRAVENOUS at 12:22

## 2024-01-09 RX ADMIN — HYDROMORPHONE HYDROCHLORIDE 1 MILLIGRAM(S): 2 INJECTION INTRAMUSCULAR; INTRAVENOUS; SUBCUTANEOUS at 09:16

## 2024-01-09 RX ADMIN — Medication 1 MILLIGRAM(S): at 23:33

## 2024-01-09 RX ADMIN — HYDROMORPHONE HYDROCHLORIDE 1 MILLIGRAM(S): 2 INJECTION INTRAMUSCULAR; INTRAVENOUS; SUBCUTANEOUS at 02:15

## 2024-01-09 RX ADMIN — HYDROMORPHONE HYDROCHLORIDE 1 MILLIGRAM(S): 2 INJECTION INTRAMUSCULAR; INTRAVENOUS; SUBCUTANEOUS at 06:02

## 2024-01-09 RX ADMIN — Medication 15 MILLIGRAM(S): at 14:06

## 2024-01-09 RX ADMIN — Medication 15 MILLIGRAM(S): at 14:11

## 2024-01-09 RX ADMIN — MUPIROCIN 1 APPLICATION(S): 20 OINTMENT TOPICAL at 05:48

## 2024-01-09 NOTE — PRE PROCEDURE NOTE - PRE PROCEDURE EVALUATION
Attending Physician:            EVE                Procedure: EUS/ERCP    Indication for Procedure: dilated PD, abnormal LFTs in an obstructive pattern  ________________________________________________________  PAST MEDICAL & SURGICAL HISTORY:  Acute Pancreatitis      DM (diabetes mellitus)      Anxiety      COPD with asthma      Rheumatoid arthritis      History of alcohol use disorder      History of Cholecystectomy        ALLERGIES:  No Known Allergies    HOME MEDICATIONS:  metFORMIN 500 mg oral tablet, extended release: 1 tab(s) orally once a day  Xanax 1 mg oral tablet: 1 tab(s) orally once a day (at bedtime) as needed for  anxiety    AICD/PPM: [ ] yes   [ ] no    PERTINENT LAB DATA:                        14.7   7.55  )-----------( 192      ( 09 Jan 2024 07:00 )             42.9     01-09    137  |  105  |  6<L>  ----------------------------<  87  3.9   |  17<L>  |  0.61    Ca    8.7      09 Jan 2024 07:00  Phos  2.2     01-09  Mg     1.90     01-09    TPro  6.2  /  Alb  3.3  /  TBili  5.5<H>  /  DBili  x   /  AST  433<H>  /  ALT  597<H>  /  AlkPhos  275<H>  01-09                PHYSICAL EXAMINATION:    Height (cm): 177.8  Weight (kg): 90.8  BMI (kg/m2): 28.7  BSA (m2): 2.09T(C): 36.8  HR: 59  BP: 174/76  RR: 18  SpO2: 100%    Constitutional: NAD  HEENT: PERRLA, EOMI,    Neck:  No JVD  Respiratory: CTAB/L  Cardiovascular: S1 and S2  Gastrointestinal: BS+, soft, NT/ND  Extremities: No peripheral edema  Neurological: A/O x 3, no focal deficits  Psychiatric: Normal mood, normal affect  Skin: No rashes    ASA Class: I [ ]  II [ ]  III [ x]  IV [ ]    COMMENTS:    The patient is a suitable candidate for the planned procedure unless box checked [ ]  No, explain:    I explained the risks (bleeding, infection perforation, pancreatitis, Cv risk, anesthesia risk)/b/a with the patient. The patient agrees to proceed.

## 2024-01-09 NOTE — PROGRESS NOTE ADULT - PROBLEM SELECTOR PLAN 2
more elevated today than the past 2 days  concern for ductal narrowing  will discuss with GI if EUS is needed sooner  dc tylenol.  will consider RUQ u/s  NEED MRCP  Hepatology consult requested 1/8 more elevated today than the past 2 days  concern for ductal narrowing  will discuss with GI if EUS is needed sooner  dc tylenol.  will consider RUQ u/s  NEED MRCP----> called department for HELP  Hepatology consult requested 1/8 1/9 GI may do EUS today.  Gi greatly appreciated

## 2024-01-09 NOTE — PROGRESS NOTE ADULT - SUBJECTIVE AND OBJECTIVE BOX
need [facility]!!!!  Called need [facility]!!!!  Called Patient is a 48y old  Male who presents with a chief complaint of Acute pancreatitis (09 Jan 2024 09:44)      SUBJECTIVE / OVERNIGHT EVENTS:  resting in bed, still notes RUQ pain, darius with deep inspiration. He is s/p cholecystectomy    MEDICATIONS  (STANDING):  chlorhexidine 2% Cloths 1 Application(s) Topical <User Schedule>  dextrose 5% + sodium chloride 0.9%. 1000 milliLiter(s) (75 mL/Hr) IV Continuous <Continuous>  dextrose 5%. 1000 milliLiter(s) (50 mL/Hr) IV Continuous <Continuous>  dextrose 5%. 1000 milliLiter(s) (100 mL/Hr) IV Continuous <Continuous>  dextrose 50% Injectable 25 Gram(s) IV Push once  dextrose 50% Injectable 25 Gram(s) IV Push once  dextrose 50% Injectable 12.5 Gram(s) IV Push once  enoxaparin Injectable 40 milliGRAM(s) SubCutaneous every 24 hours  famotidine Injectable 20 milliGRAM(s) IV Push every 12 hours  glucagon  Injectable 1 milliGRAM(s) IntraMuscular once  HYDROmorphone  Injectable 1 milliGRAM(s) IV Push once  insulin lispro (ADMELOG) corrective regimen sliding scale   SubCutaneous every 6 hours  ketorolac   Injectable 15 milliGRAM(s) IV Push every 6 hours  lactated ringers. 1000 milliLiter(s) (150 mL/Hr) IV Continuous <Continuous>  mupirocin 2% Ointment 1 Application(s) Topical every 12 hours  pantoprazole  Injectable 40 milliGRAM(s) IV Push once  polyethylene glycol 3350 17 Gram(s) Oral daily  potassium phosphate IVPB 30 milliMole(s) IV Intermittent once    MEDICATIONS  (PRN):  ALPRAZolam 1 milliGRAM(s) Oral at bedtime PRN for anxiety  aluminum hydroxide/magnesium hydroxide/simethicone Suspension 30 milliLiter(s) Oral every 6 hours PRN Dyspepsia  dextrose Oral Gel 15 Gram(s) Oral once PRN Blood Glucose LESS THAN 70 milliGRAM(s)/deciliter  HYDROmorphone  Injectable 1.5 milliGRAM(s) IV Push every 3 hours PRN Severe Pain (7 - 10)  ondansetron Injectable 4 milliGRAM(s) IV Push every 8 hours PRN Nausea and/or Vomiting  saline laxative (FLEET) Rectal Enema 1 Enema Rectal daily PRN constipation  senna 2 Tablet(s) Oral at bedtime PRN Constipation  simethicone 80 milliGRAM(s) Chew daily PRN Gas        CAPILLARY BLOOD GLUCOSE  POCT Blood Glucose.: 74 mg/dL (09 Jan 2024 05:29)  POCT Blood Glucose.: 87 mg/dL (09 Jan 2024 03:11)  POCT Blood Glucose.: 122 mg/dL (08 Jan 2024 23:32)  POCT Blood Glucose.: 101 mg/dL (08 Jan 2024 19:29)  POCT Blood Glucose.: 83 mg/dL (08 Jan 2024 12:45)    I&O's Summary    Vital Signs Last 24 Hrs  T(C): 36.8 (09 Jan 2024 06:06), Max: 36.8 (09 Jan 2024 06:06)  T(F): 98.3 (09 Jan 2024 06:06), Max: 98.3 (09 Jan 2024 06:06)  HR: 62 (09 Jan 2024 06:06) (60 - 67)  BP: 148/69 (09 Jan 2024 06:06) (143/76 - 153/68)  BP(mean): --  RR: 18 (09 Jan 2024 06:06) (17 - 18)  SpO2: 98% (09 Jan 2024 06:06) (98% - 98%)    Parameters below as of 09 Jan 2024 06:06  Patient On (Oxygen Delivery Method): room air      PHYSICAL EXAM:  GENERAL: NAD,   HEAD:  Atraumatic, Normocephalic  EYES: EOMI, PERRLA, conjunctiva and sclera clear  NECK: Supple, No JVD  CHEST/LUNG: Clear to auscultation bilaterally; No wheeze  HEART: Regular rate and rhythm; No murmurs, rubs, or gallops  ABDOMEN: Soft, tender to epigastric and to RUQ, positive Haque's sign  Nondistended; Bowel sounds present  EXTREMITIES:  2+ Peripheral Pulses, No clubbing, cyanosis, or edema  PSYCH: AAOx3  NEUROLOGY: non-focal  SKIN: No rashes or lesions    LABS:                        14.7   7.55  )-----------( 192      ( 09 Jan 2024 07:00 )             42.9     01-09    137  |  105  |  6<L>  ----------------------------<  87  3.9   |  17<L>  |  0.61    Ca    8.7      09 Jan 2024 07:00  Phos  2.2     01-09  Mg     1.90     01-09    TPro  6.2  /  Alb  3.3  /  TBili  5.5<H>  /  DBili  x   /  AST  433<H>  /  ALT  597<H>  /  AlkPhos  275<H>  01-09          RADIOLOGY & ADDITIONAL TESTS:  rad< from: CT Abdomen and Pelvis w/ IV Cont (01.04.24 @ 15:45) >  LIVER: Within normal limits.  BILE DUCTS: Normal caliber.  GALLBLADDER: Cholecystectomy.  SPLEEN: Within normal limits.  PANCREAS: New pancreatic ductal dilatation to the level of the ampulla   compared to 12/18/2023. Mildly increased peripancreatic/periduodenal   infiltration. Severe edema along the medial wall of the adjacent   duodenum, increased.  ADRENALS: Within normal limits.    < end of copied text >  < from: CT Abdomen and Pelvis w/ IV Cont (01.04.24 @ 15:45) >  IMPRESSION:  Mild interval worsening of acute pancreatitis with new pancreatic ductal   dilatation to level of theampulla.  Ductal dilatation could be due to an ampullary stricture or severe   inflammation of the periampullary duodenal wall.    < end of copied text >      Care Discussed with Consultants/Other Providers:      need MRCP!!!!  Called

## 2024-01-09 NOTE — PROGRESS NOTE ADULT - ASSESSMENT
49 yo man with history of recurrent pancreatitis, ?asthma/COPD (not on home O2), rheumatoid arthritis (not on any meds),  type 2 DM (not on insulin), anxiety, and alcohol use disorder (sober for last ~20 years) presents with worsening epigastric pain that started shortly after pt was discharged from [facility] on 12/19/23, admitted with acute pancreatitis. Pain syndrome and elevated lipase is consistent.  47 yo man with history of recurrent pancreatitis, ?asthma/COPD (not on home O2), rheumatoid arthritis (not on any meds),  type 2 DM (not on insulin), anxiety, and alcohol use disorder (sober for last ~20 years) presents with worsening epigastric pain that started shortly after pt was discharged from [facility] on 12/19/23, admitted with acute pancreatitis. Pain syndrome and elevated lipase is consistent.  49 yo man with history of recurrent pancreatitis, ?asthma/COPD (not on home O2), rheumatoid arthritis (not on any meds),  type 2 DM (not on insulin), anxiety, and alcohol use disorder (sober for last ~20 years) presents with worsening epigastric pain that started shortly after pt was discharged from [facility] on 12/19/23, admitted with acute pancreatitis. Pain syndrome and elevated lipase is consistent.     CT Abdomen and Pelvis w/ IV Cont (01.04.24 @ 15:45) >  IMPRESSION:  Mild interval worsening of acute pancreatitis with new pancreatic ductal   dilatation to level of theampulla.  Ductal dilatation could be due to an ampullary stricture or severe   inflammation of the periampullary duodenal wall.     47 yo man with history of recurrent pancreatitis, ?asthma/COPD (not on home O2), rheumatoid arthritis (not on any meds),  type 2 DM (not on insulin), anxiety, and alcohol use disorder (sober for last ~20 years) presents with worsening epigastric pain that started shortly after pt was discharged from [facility] on 12/19/23, admitted with acute pancreatitis. Pain syndrome and elevated lipase is consistent.     CT Abdomen and Pelvis w/ IV Cont (01.04.24 @ 15:45) >  IMPRESSION:  Mild interval worsening of acute pancreatitis with new pancreatic ductal   dilatation to level of theampulla.  Ductal dilatation could be due to an ampullary stricture or severe   inflammation of the periampullary duodenal wall.

## 2024-01-09 NOTE — PROGRESS NOTE ADULT - PROBLEM SELECTOR PLAN 4
Pt with marked sinus dorian to 47 bpm on admission EKG. Pt HD stable and asymptomatic from bradycardia. Likely near pt's baseline resting HR, as pt noted to have HR in low 60s at [place].   - No AV blocks noted on admission EKG  - WNL  - Monitor VS q4hrs    1/9 HR 71, Observe Pt with marked sinus dorian to 47 bpm on admission EKG. Pt HD stable and asymptomatic from bradycardia. Likely near pt's baseline resting HR, as pt noted to have HR in low 60s at [place].   - No AV blocks noted on admission EKG  - WNL  - Monitor VS q4hrs    1/9 HR 71, Observe

## 2024-01-09 NOTE — PROGRESS NOTE ADULT - PROBLEM SELECTOR PLAN 1
Pt presenting with worsening epigastric pain x2 weeks, associated with nausea and poor PO intake. Lipase elevated to 1282. Most likely acute pancreatitis, though unclear etiology. Pt denies any current alcohol use. TG 76 on admission. IgG4 in Dec 2023 wnl. Possibly from nicotine vaping? unclear why Pt regressed/relapsed. Previous CT scan reviewed,  repeat CT does note mildly worsening pancreatitis with narrowing of ampulla, unclear from inflammation vs stricture   - S/p 2L bolus of NS in ED.   - c/w NPO and sips of water and IVF  -repeat CT does note mildly worsening pancreatitis with narrowing of ampulla, unclear from inflammation vs stricture   -change pain regimen to dilaudid 1mg q4h ---> q3   -encourage bowel regimen  -pepcid BID IV BID  - Serial abdominal exams  -appreciate GI input---> wants MRCP---> rd and pending  -f/u repeat labs-LFTs stable Pt presenting with worsening epigastric pain x2 weeks, associated with nausea and poor PO intake. Lipase elevated to 1282. Most likely acute pancreatitis, though unclear etiology. Pt denies any current alcohol use. TG 76 on admission. IgG4 in Dec 2023 wnl. Possibly from nicotine vaping? unclear why Pt regressed/relapsed. Previous CT scan reviewed,  repeat CT does note mildly worsening pancreatitis with narrowing of ampulla, unclear from inflammation vs stricture   - S/p 2L bolus of NS in ED.   - c/w NPO and sips of water and IVF  -repeat CT does note mildly worsening pancreatitis with narrowing of ampulla, unclear from inflammation vs stricture   -change pain regimen to dilaudid 1mg q4h ---> q3 --->On  1/9 inc to 1.5 mg q3 prn  -encourage bowel regimen  -pepcid BID IV BID  - Serial abdominal exams  -appreciate GI input---> wants MRCP---> rd and pending  repeat LFT rising---> hepatology consult requested.

## 2024-01-09 NOTE — PACU DISCHARGE NOTE - PAIN:
Carlos Zhou 53 
[address] [facility] [address] [id number] [phone] Work/School Note Date: 12/7/2020 To Whom It May concern: 
 
Isa Iyer was seen and treated today in the emergency room by the following provider(s): 
Attending Provider: Leia Holder MD 
Physician Assistant: SUZY Garcia. Isa Iyer is excused from work/school on 12/07/20 and 12/08/20. She is medically clear to return to work/school on 12/9/2020. Sincerely, SUZY Merino  
 
 
 Controlled with current regime

## 2024-01-09 NOTE — CHART NOTE - NSCHARTNOTEFT_GEN_A_CORE
5:15 Pm  Called wife Mrs Sandoval at [phone] and updated her.  [facility] Dr Bailey will do EUS and possibly ERCP today.  Also pain medication was increased from 1 to 1.5 mg Dilaudid iv q3 prn  Also called [facility] [phone] and request Dr Bailey -[facility] to call wife after procedure.    Internal Medicine  Denisha Griggs.  #[id number] 5:15 Pm  Called wife Mrs Sandoval at [phone] and updated her.  [facility] Dr Bailey will do EUS and possibly ERCP today.  Also pain medication was increased from 1 to 1.5 mg Dilaudid iv q3 prn  Also called Gi [phone] and request Dr Bailey -[facility] to call wife after procedure.    Internal Medicine  Denisha Griggs.  #[phone]

## 2024-01-09 NOTE — PROGRESS NOTE ADULT - PROBLEM SELECTOR PLAN 3
WBC 12.91 on admission. Pt did not meet SIRS criteria, however. Leukocytosis likely in setting of acute pancreatitis. Pt afebrile.  -resolved   - Monitor off antibiotics for now WBC 12.91 on admission. Pt did not meet SIRS criteria, however. Leukocytosis likely in setting of acute pancreatitis. Pt afebrile.  -resolved   - Monitor off antibiotics for now  1/9/23 WBC 7.5 RESOLVED LEUKOCYTOSIS

## 2024-01-10 DIAGNOSIS — E43 UNSPECIFIED SEVERE PROTEIN-CALORIE MALNUTRITION: ICD-10-CM

## 2024-01-10 LAB
ALBUMIN SERPL ELPH-MCNC: 3.6 G/DL — SIGNIFICANT CHANGE UP (ref 3.3–5)
ALBUMIN SERPL ELPH-MCNC: 3.6 G/DL — SIGNIFICANT CHANGE UP (ref 3.3–5)
ALP SERPL-CCNC: 283 U/L — HIGH (ref 40–120)
ALP SERPL-CCNC: 283 U/L — HIGH (ref 40–120)
ALT FLD-CCNC: 689 U/L — HIGH (ref 4–41)
ALT FLD-CCNC: 689 U/L — HIGH (ref 4–41)
ANION GAP SERPL CALC-SCNC: 14 MMOL/L — SIGNIFICANT CHANGE UP (ref 7–14)
ANION GAP SERPL CALC-SCNC: 14 MMOL/L — SIGNIFICANT CHANGE UP (ref 7–14)
AST SERPL-CCNC: 416 U/L — HIGH (ref 4–40)
AST SERPL-CCNC: 416 U/L — HIGH (ref 4–40)
BILIRUB SERPL-MCNC: 6.3 MG/DL — HIGH (ref 0.2–1.2)
BILIRUB SERPL-MCNC: 6.3 MG/DL — HIGH (ref 0.2–1.2)
BUN SERPL-MCNC: 4 MG/DL — LOW (ref 7–23)
BUN SERPL-MCNC: 4 MG/DL — LOW (ref 7–23)
CALCIUM SERPL-MCNC: 8.9 MG/DL — SIGNIFICANT CHANGE UP (ref 8.4–10.5)
CALCIUM SERPL-MCNC: 8.9 MG/DL — SIGNIFICANT CHANGE UP (ref 8.4–10.5)
CHLORIDE SERPL-SCNC: 105 MMOL/L — SIGNIFICANT CHANGE UP (ref 98–107)
CHLORIDE SERPL-SCNC: 105 MMOL/L — SIGNIFICANT CHANGE UP (ref 98–107)
CO2 SERPL-SCNC: 22 MMOL/L — SIGNIFICANT CHANGE UP (ref 22–31)
CO2 SERPL-SCNC: 22 MMOL/L — SIGNIFICANT CHANGE UP (ref 22–31)
CREAT SERPL-MCNC: 0.69 MG/DL — SIGNIFICANT CHANGE UP (ref 0.5–1.3)
CREAT SERPL-MCNC: 0.69 MG/DL — SIGNIFICANT CHANGE UP (ref 0.5–1.3)
EGFR: 113 ML/MIN/1.73M2 — SIGNIFICANT CHANGE UP
EGFR: 113 ML/MIN/1.73M2 — SIGNIFICANT CHANGE UP
GLUCOSE BLDC GLUCOMTR-MCNC: 114 MG/DL — HIGH (ref 70–99)
GLUCOSE BLDC GLUCOMTR-MCNC: 114 MG/DL — HIGH (ref 70–99)
GLUCOSE BLDC GLUCOMTR-MCNC: 115 MG/DL — HIGH (ref 70–99)
GLUCOSE BLDC GLUCOMTR-MCNC: 115 MG/DL — HIGH (ref 70–99)
GLUCOSE SERPL-MCNC: 108 MG/DL — HIGH (ref 70–99)
GLUCOSE SERPL-MCNC: 108 MG/DL — HIGH (ref 70–99)
HCT VFR BLD CALC: 41.5 % — SIGNIFICANT CHANGE UP (ref 39–50)
HCT VFR BLD CALC: 41.5 % — SIGNIFICANT CHANGE UP (ref 39–50)
HGB BLD-MCNC: 14.5 G/DL — SIGNIFICANT CHANGE UP (ref 13–17)
HGB BLD-MCNC: 14.5 G/DL — SIGNIFICANT CHANGE UP (ref 13–17)
MAGNESIUM SERPL-MCNC: 1.9 MG/DL — SIGNIFICANT CHANGE UP (ref 1.6–2.6)
MAGNESIUM SERPL-MCNC: 1.9 MG/DL — SIGNIFICANT CHANGE UP (ref 1.6–2.6)
MCHC RBC-ENTMCNC: 27.5 PG — SIGNIFICANT CHANGE UP (ref 27–34)
MCHC RBC-ENTMCNC: 27.5 PG — SIGNIFICANT CHANGE UP (ref 27–34)
MCHC RBC-ENTMCNC: 34.9 GM/DL — SIGNIFICANT CHANGE UP (ref 32–36)
MCHC RBC-ENTMCNC: 34.9 GM/DL — SIGNIFICANT CHANGE UP (ref 32–36)
MCV RBC AUTO: 78.7 FL — LOW (ref 80–100)
MCV RBC AUTO: 78.7 FL — LOW (ref 80–100)
NRBC # BLD: 0 /100 WBCS — SIGNIFICANT CHANGE UP (ref 0–0)
NRBC # BLD: 0 /100 WBCS — SIGNIFICANT CHANGE UP (ref 0–0)
NRBC # FLD: 0 K/UL — SIGNIFICANT CHANGE UP (ref 0–0)
NRBC # FLD: 0 K/UL — SIGNIFICANT CHANGE UP (ref 0–0)
PHOSPHATE SERPL-MCNC: 2.8 MG/DL — SIGNIFICANT CHANGE UP (ref 2.5–4.5)
PHOSPHATE SERPL-MCNC: 2.8 MG/DL — SIGNIFICANT CHANGE UP (ref 2.5–4.5)
PLATELET # BLD AUTO: 192 K/UL — SIGNIFICANT CHANGE UP (ref 150–400)
PLATELET # BLD AUTO: 192 K/UL — SIGNIFICANT CHANGE UP (ref 150–400)
POTASSIUM SERPL-MCNC: 3.4 MMOL/L — LOW (ref 3.5–5.3)
POTASSIUM SERPL-MCNC: 3.4 MMOL/L — LOW (ref 3.5–5.3)
POTASSIUM SERPL-SCNC: 3.4 MMOL/L — LOW (ref 3.5–5.3)
POTASSIUM SERPL-SCNC: 3.4 MMOL/L — LOW (ref 3.5–5.3)
PROT SERPL-MCNC: 6.2 G/DL — SIGNIFICANT CHANGE UP (ref 6–8.3)
PROT SERPL-MCNC: 6.2 G/DL — SIGNIFICANT CHANGE UP (ref 6–8.3)
RBC # BLD: 5.27 M/UL — SIGNIFICANT CHANGE UP (ref 4.2–5.8)
RBC # BLD: 5.27 M/UL — SIGNIFICANT CHANGE UP (ref 4.2–5.8)
RBC # FLD: 13.5 % — SIGNIFICANT CHANGE UP (ref 10.3–14.5)
RBC # FLD: 13.5 % — SIGNIFICANT CHANGE UP (ref 10.3–14.5)
SODIUM SERPL-SCNC: 141 MMOL/L — SIGNIFICANT CHANGE UP (ref 135–145)
SODIUM SERPL-SCNC: 141 MMOL/L — SIGNIFICANT CHANGE UP (ref 135–145)
WBC # BLD: 6.32 K/UL — SIGNIFICANT CHANGE UP (ref 3.8–10.5)
WBC # BLD: 6.32 K/UL — SIGNIFICANT CHANGE UP (ref 3.8–10.5)
WBC # FLD AUTO: 6.32 K/UL — SIGNIFICANT CHANGE UP (ref 3.8–10.5)
WBC # FLD AUTO: 6.32 K/UL — SIGNIFICANT CHANGE UP (ref 3.8–10.5)

## 2024-01-10 PROCEDURE — 99232 SBSQ HOSP IP/OBS MODERATE 35: CPT | Mod: GC

## 2024-01-10 PROCEDURE — 99233 SBSQ HOSP IP/OBS HIGH 50: CPT

## 2024-01-10 PROCEDURE — 74181 MRI ABDOMEN W/O CONTRAST: CPT | Mod: 26

## 2024-01-10 RX ORDER — POTASSIUM CHLORIDE 20 MEQ
40 PACKET (EA) ORAL ONCE
Refills: 0 | Status: COMPLETED | OUTPATIENT
Start: 2024-01-10 | End: 2024-01-10

## 2024-01-10 RX ADMIN — CHLORHEXIDINE GLUCONATE 1 APPLICATION(S): 213 SOLUTION TOPICAL at 06:00

## 2024-01-10 RX ADMIN — HYDROMORPHONE HYDROCHLORIDE 1 MILLIGRAM(S): 2 INJECTION INTRAMUSCULAR; INTRAVENOUS; SUBCUTANEOUS at 16:43

## 2024-01-10 RX ADMIN — MUPIROCIN 1 APPLICATION(S): 20 OINTMENT TOPICAL at 09:07

## 2024-01-10 RX ADMIN — SODIUM CHLORIDE 75 MILLILITER(S): 9 INJECTION, SOLUTION INTRAVENOUS at 13:47

## 2024-01-10 RX ADMIN — SODIUM CHLORIDE 75 MILLILITER(S): 9 INJECTION, SOLUTION INTRAVENOUS at 22:48

## 2024-01-10 RX ADMIN — FAMOTIDINE 20 MILLIGRAM(S): 10 INJECTION INTRAVENOUS at 06:00

## 2024-01-10 RX ADMIN — Medication 15 MILLIGRAM(S): at 09:07

## 2024-01-10 RX ADMIN — FAMOTIDINE 20 MILLIGRAM(S): 10 INJECTION INTRAVENOUS at 18:51

## 2024-01-10 RX ADMIN — HYDROMORPHONE HYDROCHLORIDE 1 MILLIGRAM(S): 2 INJECTION INTRAMUSCULAR; INTRAVENOUS; SUBCUTANEOUS at 16:58

## 2024-01-10 RX ADMIN — Medication 40 MILLIEQUIVALENT(S): at 13:28

## 2024-01-10 RX ADMIN — Medication 15 MILLIGRAM(S): at 09:22

## 2024-01-10 RX ADMIN — SODIUM CHLORIDE 1000 MILLILITER(S): 9 INJECTION, SOLUTION INTRAVENOUS at 13:41

## 2024-01-10 RX ADMIN — MUPIROCIN 1 APPLICATION(S): 20 OINTMENT TOPICAL at 22:48

## 2024-01-10 NOTE — PROGRESS NOTE ADULT - PROBLEM SELECTOR PLAN 9
Pt formerly a heavy drinker, has been sober for ~20 years. No S/S of withdrawal on exam,  No tongue fasciculations or tremors on exam.   - Monitor for possible withdrawal S/S off CIWA for now Pt denies any history of COPD or asthma, although documented in pt's EMR that he has COPD and asthma. Pt not on any inhalers at home.  - Monitor respiratory status  - Supplemental O2 PRN

## 2024-01-10 NOTE — PROVIDER CONTACT NOTE (OTHER) - BACKGROUND
Admitted for Abdominal pain
pt admit for acute pancreatitis
Pt admitted with acute pancreatitis without infection or necrosis
49 y/o M admitted for acute pancreatitis, previously NPO and now advanced to clear liquids.
Pt admitted with acute pancreatitis without infection or necrosis
49 y/o M admitted for acute pancreatitis s/p ERCP 1/9
Pt admitted with acute pancreatitis without infection or necrosis

## 2024-01-10 NOTE — PROVIDER CONTACT NOTE (OTHER) - REASON
LR Bolus
Pt's /81 and HR 54
hypertension/c/o pain
Pt's /95
Refused AM labs
/101
Pt declining fingerstick

## 2024-01-10 NOTE — PROGRESS NOTE ADULT - PROBLEM SELECTOR PLAN 4
Pt with marked sinus dorian to 47 bpm on admission EKG. Pt HD stable and asymptomatic from bradycardia. Likely near pt's baseline resting HR, as pt noted to have HR in low 60s at [place].   - No AV blocks noted on admission EKG  - WNL  - Monitor VS q4hrs    1/9 HR 71, Observe Pt with marked sinus dorian to 47 bpm on admission EKG. Pt HD stable and asymptomatic from bradycardia. Likely near pt's baseline resting HR, as pt noted to have HR in low 60s at [place].   - No AV blocks noted on admission EKG  - WNL  - Monitor VS q4hrs    1/9 HR 71, Observe

## 2024-01-10 NOTE — PROGRESS NOTE ADULT - NUTRITIONAL ASSESSMENT
This patient has been assessed with a concern for Malnutrition and has been determined to have a diagnosis/diagnoses of Severe protein-calorie malnutrition.    This patient is being managed with:   Diet NPO-  Entered: Jan 7 2024  3:46PM   This patient has been assessed with a concern for Malnutrition and has been determined to have a diagnosis/diagnoses of Severe protein-calorie malnutrition.    This patient is being managed with:   Diet NPO---> CLEAR LIQUID  Entered: Jan 7 2024  3:46PM

## 2024-01-10 NOTE — PROGRESS NOTE ADULT - PROBLEM SELECTOR PLAN 3
WBC 12.91 on admission. Pt did not meet SIRS criteria, however. Leukocytosis likely in setting of acute pancreatitis. Pt afebrile.  -resolved   - Monitor off antibiotics for now  1/9/23 WBC 7.5 RESOLVED LEUKOCYTOSIS

## 2024-01-10 NOTE — PROGRESS NOTE ADULT - SUBJECTIVE AND OBJECTIVE BOX
Gastroenterology/Hepatology Progress Note    Interval Events:   - patient reports doing well  - no abdominal pain at all     Allergies:  No Known Allergies      Hospital Medications:  ALPRAZolam 1 milliGRAM(s) Oral at bedtime PRN  aluminum hydroxide/magnesium hydroxide/simethicone Suspension 30 milliLiter(s) Oral every 6 hours PRN  chlorhexidine 2% Cloths 1 Application(s) Topical <User Schedule>  dextrose 5% + sodium chloride 0.9%. 1000 milliLiter(s) IV Continuous <Continuous>  dextrose 5%. 1000 milliLiter(s) IV Continuous <Continuous>  dextrose 5%. 1000 milliLiter(s) IV Continuous <Continuous>  dextrose 50% Injectable 25 Gram(s) IV Push once  dextrose 50% Injectable 25 Gram(s) IV Push once  dextrose 50% Injectable 12.5 Gram(s) IV Push once  dextrose Oral Gel 15 Gram(s) Oral once PRN  enoxaparin Injectable 40 milliGRAM(s) SubCutaneous every 24 hours  famotidine Injectable 20 milliGRAM(s) IV Push every 12 hours  glucagon  Injectable 1 milliGRAM(s) IntraMuscular once  HYDROmorphone  Injectable 1.5 milliGRAM(s) IV Push every 3 hours PRN  HYDROmorphone  Injectable 1 milliGRAM(s) IV Push once  insulin lispro (ADMELOG) corrective regimen sliding scale   SubCutaneous every 6 hours  ketorolac   Injectable 15 milliGRAM(s) IV Push every 6 hours  lactated ringers Bolus 2000 milliLiter(s) IV Bolus once  lactated ringers. 1000 milliLiter(s) IV Continuous <Continuous>  mupirocin 2% Ointment 1 Application(s) Topical every 12 hours  ondansetron Injectable 4 milliGRAM(s) IV Push every 8 hours PRN  pantoprazole  Injectable 40 milliGRAM(s) IV Push once  polyethylene glycol 3350 17 Gram(s) Oral daily  potassium chloride    Tablet ER 40 milliEquivalent(s) Oral once  saline laxative (FLEET) Rectal Enema 1 Enema Rectal daily PRN  senna 2 Tablet(s) Oral at bedtime PRN  simethicone 80 milliGRAM(s) Chew daily PRN      ROS: 14 point ROS negative unless otherwise state in subjective    PHYSICAL EXAM:   Vital Signs:  Vital Signs Last 24 Hrs  T(C): 36.4 (10 Osvaldo 2024 06:04), Max: 36.8 (09 Jan 2024 16:54)  T(F): 97.6 (10 Osvaldo 2024 06:04), Max: 98.2 (09 Jan 2024 16:54)  HR: 60 (10 Osvaldo 2024 06:04) (56 - 82)  BP: 136/64 (10 Osvaldo 2024 06:04) (128/95 - 174/76)  BP(mean): 97 (09 Jan 2024 21:00) (90 - 102)  RR: 18 (10 Osvaldo 2024 06:04) (13 - 20)  SpO2: 98% (10 Osvaldo 2024 06:04) (95% - 100%)    Parameters below as of 10 Osvaldo 2024 06:04  Patient On (Oxygen Delivery Method): room air      Daily     Daily     GENERAL:  No acute distress  HEENT:  NCAT, +scleral icterus  CHEST: no resp distress  HEART:  RRR  ABDOMEN:  Soft, non-tender, non-distended, no masses  EXTREMITIES:  No cyanosis, clubbing, or edema  SKIN:  No rash/erythema/ecchymoses/petechiae/wounds/abscess/warm/dry. Jaundiced  NEURO:  Alert and oriented x 3     LABS:                        14.5   6.32  )-----------( 192      ( 10 Osvaldo 2024 06:00 )             41.5     Mean Cell Volume: 78.7 fL (01-10-24 @ 06:00)    01-10    141  |  105  |  4<L>  ----------------------------<  108<H>  3.4<L>   |  22  |  0.69    Ca    8.9      10 Osvaldo 2024 06:00  Phos  2.8     01-10  Mg     1.90     01-10    TPro  6.2  /  Alb  3.6  /  TBili  6.3<H>  /  DBili  x   /  AST  416<H>  /  ALT  689<H>  /  AlkPhos  283<H>  01-10    LIVER FUNCTIONS - ( 10 Osvaldo 2024 06:00 )  Alb: 3.6 g/dL / Pro: 6.2 g/dL / ALK PHOS: 283 U/L / ALT: 689 U/L / AST: 416 U/L / GGT: x             Urinalysis Basic - ( 10 Osvaldo 2024 06:00 )    Color: x / Appearance: x / SG: x / pH: x  Gluc: 108 mg/dL / Ketone: x  / Bili: x / Urobili: x   Blood: x / Protein: x / Nitrite: x   Leuk Esterase: x / RBC: x / WBC x   Sq Epi: x / Non Sq Epi: x / Bacteria: x            Imaging:

## 2024-01-10 NOTE — PROGRESS NOTE ADULT - PROBLEM SELECTOR PLAN 7
Pt not on any meds at this time. Stable, no S/S of flare.  - Pain control with Tylenol PRN now on clear liquid diet. plan to advance to full liquid diet with ensure in AM since pain now improving after EUS 1/9/24.

## 2024-01-10 NOTE — PROVIDER CONTACT NOTE (OTHER) - DATE AND TIME:
05-Jan-2024 05:55
10-Osvaldo-2024 19:00
10-Osvaldo-2024 19:00
05-Jan-2024 21:40
05-Jan-2024 21:02
06-Jan-2024 07:45
04-Jan-2024 12:40

## 2024-01-10 NOTE — PROVIDER CONTACT NOTE (OTHER) - ACTION/TREATMENT ORDERED:
As per provider, OK to skip for now. RN educated pt on s/s of hypoglycemia & hyperglycemia using teach back method. Pt verbalized understanding.
Give Toradol 15mg IVP as ordered, day team will round on pt.
No further treatments at this time, provide additional pain management as ordered when needed.
Ordered FS atleast.
Stop remaining 1L of LR bolus.
Pain management provided as ordered, recheck bp manually.
attending and NP at bedside educating patient on risks of dilaudid on the GI system. providers to order tylenol and malox

## 2024-01-10 NOTE — PROGRESS NOTE ADULT - PROBLEM SELECTOR PLAN 1
Pt presenting with worsening epigastric pain x2 weeks, associated with nausea and poor PO intake. Lipase elevated to 1282. Most likely acute pancreatitis, though unclear etiology. Pt denies any current alcohol use. TG 76 on admission. IgG4 in Dec 2023 wnl. Possibly from nicotine vaping? unclear why Pt regressed/relapsed. Previous CT scan reviewed,  repeat CT does note mildly worsening pancreatitis with narrowing of ampulla, unclear from inflammation vs stricture   - S/p 2L bolus of NS in ED.   - c/w NPO and sips of water and IVF  -repeat CT does note mildly worsening pancreatitis with narrowing of ampulla, unclear from inflammation vs stricture   -change pain regimen to dilaudid 1mg q4h ---> q3 --->On  1/9 inc to 1.5 mg q3 prn  -encourage bowel regimen  -pepcid BID IV BID  - Serial abdominal exams  -appreciate GI input---> wants MRCP---> rd and pending  repeat LFT rising---> hepatology consult requested.

## 2024-01-10 NOTE — PROGRESS NOTE ADULT - SUBJECTIVE AND OBJECTIVE BOX
ANESTHESIA POSTOP CHECK    48y Male POSTOP DAY 1 S/P     [ X] NO APPARENT ANESTHESIA COMPLICATIONS      Comments:

## 2024-01-10 NOTE — PROGRESS NOTE ADULT - ASSESSMENT
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 or severe inflammation of the periampullary duodenal wall 2/2 pancreatitis  Labs today showing rising liver chemistry with TB elevation of 3.2, , ,   - recurrent episodes for decades; previous triggers alcohol (sober for 20 years), methotrexate (off for 5 years), and gallstone (s/p cholecystectomy 2009); TG normal; IGG4 normal  - recently admitted for [facility] with supportive care (IVF, pain control, anti-emetics) with improvement in symptoms for about 2 days before recurrent symptoms  - CT 1/4 with mild interval worsening of acute pancreatitis with new pancreatic ductal dilatation to level of the ampulla. Ductal dilatation could be due to an ampullary stricture or severe inflammation of the periampullary duodenal wall; discussed with advanced GI team; suspect findings 2/2 acute inflammation 2/2 pancreatitis; suspect findings will improve with treatment of underlying pancreatitis  - US 12/18, CBD 6mm; normal   - s/p EUS/ERCP with a 5fr-9 cm single pigtail stent was placed under endoscopic and fluoroscopic guidance to drain the PD and help resolve the acute pancreatitis. biliary sphincterotomy which released calcified intra-ampullary stone that was blocking the biliary orifice and likely causing the abnormal LFTs.    #T2DM; not on SGLT2  #RA; not on any medications    Recommendations  - please complete 3L LR bolus post-procedure   - continue to trend transaminases   - can advance diet as tolerated   - if any fevers or leukocytosis, start empiric antibiotics  - continue miralax if able to tolerate  - out of bed and ambulating as tolerating  - will need repeat ERCP in 3 months   - Please provide patient with [facility] outpatient follow up number for an appointment [phone] ([facility] [place])   - continue pain control and anti-emetics    Note and recommendations are incomplete until reviewed and attested by attending.   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 or severe inflammation of the periampullary duodenal wall 2/2 pancreatitis  Labs today showing rising liver chemistry with TB elevation of 3.2, , ,   - recurrent episodes for decades; previous triggers alcohol (sober for 20 years), methotrexate (off for 5 years), and gallstone (s/p cholecystectomy 2009); TG normal; IGG4 normal  - recently admitted for [facility] with supportive care (IVF, pain control, anti-emetics) with improvement in symptoms for about 2 days before recurrent symptoms  - CT 1/4 with mild interval worsening of acute pancreatitis with new pancreatic ductal dilatation to level of the ampulla. Ductal dilatation could be due to an ampullary stricture or severe inflammation of the periampullary duodenal wall; discussed with advanced GI team; suspect findings 2/2 acute inflammation 2/2 pancreatitis; suspect findings will improve with treatment of underlying pancreatitis  - US 12/18, CBD 6mm; normal   - s/p EUS/ERCP with a 5fr-9 cm single pigtail stent was placed under endoscopic and fluoroscopic guidance to drain the PD and help resolve the acute pancreatitis. biliary sphincterotomy which released calcified intra-ampullary stone that was blocking the biliary orifice and likely causing the abnormal LFTs.    #T2DM; not on SGLT2  #RA; not on any medications    Recommendations  - please complete 3L LR bolus post-procedure   - continue to trend transaminases   - can advance diet as tolerated   - if any fevers or leukocytosis, start empiric antibiotics  - continue miralax if able to tolerate  - out of bed and ambulating as tolerating  - will need repeat ERCP in 3 months   - Please provide patient with [facility]ology Clinic outpatient follow up number for an appointment [phone] ([facility] [place])   - continue pain control and anti-emetics    Note and recommendations are incomplete until reviewed and attested by attending.

## 2024-01-10 NOTE — PROGRESS NOTE ADULT - PROBLEM SELECTOR PLAN 2
more elevated today than the past 2 days  concern for ductal narrowing  will discuss with GI if EUS is needed sooner  dc tylenol.  will consider RUQ u/s  NEED MRCP----> called department for HELP  Hepatology consult requested 1/8 1/9 GI may do EUS today.  Gi greatly appreciated more elevated today than the past 2 days  concern for ductal narrowing  will discuss with GI if EUS is needed sooner  dc tylenol.  will consider RUQ u/s  NEED MRCP----> called department for HELP  Hepatology consult requested 1/8 1/9 GI may do EUS today.  Gi greatly appreciated  1/10 EUS as above. Tbili 6.3. f/u MRCP today and GI

## 2024-01-10 NOTE — PROGRESS NOTE ADULT - PROBLEM SELECTOR PLAN 10
- DVT ppx: Lovenox SQ 40 mg daily   - Diet: NPO except for meds--> advance to [facility] at lunch Pt formerly a heavy drinker, has been sober for ~20 years. No S/S of withdrawal on exam,  No tongue fasciculations or tremors on exam.   - Monitor for possible withdrawal S/S off CIWA for now

## 2024-01-10 NOTE — PROVIDER CONTACT NOTE (OTHER) - RECOMMENDATIONS
Notify MD, give pain medications and recheck
pt states Dilaudid or iv tylenol is the only thing that work
Notify MD, give pain medications.
Stop remaining 1L of LR bolus.
Education on hypoglycemia & hyperglycemia s/s
Notify MD

## 2024-01-10 NOTE — PROGRESS NOTE ADULT - SUBJECTIVE AND OBJECTIVE BOX
Patient is a 48y old  Male who presents with a chief complaint of Acute pancreatitis (09 Jan 2024 09:44)      SUBJECTIVE / OVERNIGHT EVENTS:    MEDICATIONS  (STANDING):  chlorhexidine 2% Cloths 1 Application(s) Topical <User Schedule>  dextrose 5% + sodium chloride 0.9%. 1000 milliLiter(s) (75 mL/Hr) IV Continuous <Continuous>  dextrose 5%. 1000 milliLiter(s) (50 mL/Hr) IV Continuous <Continuous>  dextrose 5%. 1000 milliLiter(s) (100 mL/Hr) IV Continuous <Continuous>  dextrose 50% Injectable 25 Gram(s) IV Push once  dextrose 50% Injectable 25 Gram(s) IV Push once  dextrose 50% Injectable 12.5 Gram(s) IV Push once  enoxaparin Injectable 40 milliGRAM(s) SubCutaneous every 24 hours  famotidine Injectable 20 milliGRAM(s) IV Push every 12 hours  glucagon  Injectable 1 milliGRAM(s) IntraMuscular once  HYDROmorphone  Injectable 1 milliGRAM(s) IV Push once  insulin lispro (ADMELOG) corrective regimen sliding scale   SubCutaneous every 6 hours  ketorolac   Injectable 15 milliGRAM(s) IV Push every 6 hours  lactated ringers Bolus 2000 milliLiter(s) IV Bolus once  lactated ringers. 1000 milliLiter(s) (150 mL/Hr) IV Continuous <Continuous>  mupirocin 2% Ointment 1 Application(s) Topical every 12 hours  pantoprazole  Injectable 40 milliGRAM(s) IV Push once  polyethylene glycol 3350 17 Gram(s) Oral daily  potassium chloride    Tablet ER 40 milliEquivalent(s) Oral once    MEDICATIONS  (PRN):  ALPRAZolam 1 milliGRAM(s) Oral at bedtime PRN for anxiety  aluminum hydroxide/magnesium hydroxide/simethicone Suspension 30 milliLiter(s) Oral every 6 hours PRN Dyspepsia  dextrose Oral Gel 15 Gram(s) Oral once PRN Blood Glucose LESS THAN 70 milliGRAM(s)/deciliter  HYDROmorphone  Injectable 1.5 milliGRAM(s) IV Push every 3 hours PRN Severe Pain (7 - 10)  ondansetron Injectable 4 milliGRAM(s) IV Push every 8 hours PRN Nausea and/or Vomiting  saline laxative (FLEET) Rectal Enema 1 Enema Rectal daily PRN constipation  senna 2 Tablet(s) Oral at bedtime PRN Constipation  simethicone 80 milliGRAM(s) Chew daily PRN Gas        CAPILLARY BLOOD GLUCOSE  POCT Blood Glucose.: 114 mg/dL (10 Osvaldo 2024 06:09)  POCT Blood Glucose.: 119 mg/dL (09 Jan 2024 23:34)  POCT Blood Glucose.: 88 mg/dL (09 Jan 2024 21:04)  POCT Blood Glucose.: 82 mg/dL (09 Jan 2024 19:23)  POCT Blood Glucose.: 108 mg/dL (09 Jan 2024 16:49)  POCT Blood Glucose.: 124 mg/dL (09 Jan 2024 12:21)    I&O's Summary    09 Jan 2024 07:01  -  10 Osvaldo 2024 07:00  --------------------------------------------------------  IN: 300 mL / OUT: 1250 mL / NET: -950 mL      Vital Signs Last 24 Hrs  T(C): 36.4 (10 Osvaldo 2024 06:04), Max: 36.8 (09 Jan 2024 11:47)  T(F): 97.6 (10 Osvaldo 2024 06:04), Max: 98.3 (09 Jan 2024 11:47)  HR: 60 (10 Osvaldo 2024 06:04) (56 - 82)  BP: 136/64 (10 Osvaldo 2024 06:04) (128/95 - 178/90)  BP(mean): 97 (09 Jan 2024 21:00) (90 - 102)  RR: 18 (10 Osvaldo 2024 06:04) (13 - 20)  SpO2: 98% (10 Osvaldo 2024 06:04) (95% - 100%)    Parameters below as of 10 Osvaldo 2024 06:04  Patient On (Oxygen Delivery Method): room air      PHYSICAL EXAM:  GENERAL: NAD,   HEAD:  Atraumatic, Normocephalic  EYES: EOMI, PERRLA, conjunctiva and sclera clear  NECK: Supple, No JVD  CHEST/LUNG: Clear to auscultation bilaterally; No wheeze  HEART: Regular rate and rhythm; No murmurs, rubs, or gallops  ABDOMEN:   EXTREMITIES:  2+ Peripheral Pulses, No clubbing, cyanosis, or edema  PSYCH: AAOx3  NEUROLOGY: non-focal  SKIN: No rashes or lesions    LABS:                        14.5   6.32  )-----------( 192      ( 10 Osvaldo 2024 06:00 )             41.5     01-10    141  |  105  |  4<L>  ----------------------------<  108<H>  3.4<L>   |  22  |  0.69    Ca    8.9      10 Osvaldo 2024 06:00  Phos  2.8     01-10  Mg     1.90     01-10    TPro  6.2  /  Alb  3.6  /  TBili  6.3<H>  /  DBili  x   /  AST  416<H>  /  ALT  689<H>  /  AlkPhos  283<H>  01-10          Urinalysis Basic - ( 10 Osvaldo 2024 06:00 )    Color: x / Appearance: x / SG: x / pH: x  Gluc: 108 mg/dL / Ketone: x  / Bili: x / Urobili: x   Blood: x / Protein: x / Nitrite: x   Leuk Esterase: x / RBC: x / WBC x   Sq Epi: x / Non Sq Epi: x / Bacteria: x        RADIOLOGY & ADDITIONAL TESTS:    Imaging Personally Reviewed:    Consultant(s) Notes Reviewed:      Care Discussed with Consultants/Other Providers:    < from: ERCP (01.09.24 @ 16:45) >  Impression:          EGD:                       -The esophagus was normal.                       -The stomach was erythematous. Biopsies were taken.                       -The duodenal bulb and D2 were normal.                     EUS: A linear scope was used.                       -The PD was dilated. There were intra-pancreatic duct                        stones (shadowing defects seen).                       -The CBD was dilated as well down to the ampulla.                       ERCP:                       -The duodenoscope was passed to the ampulla which was                        bulging. There was a calcific stone at the pancreatic                        orifice.                       -The PD was cannulated but not injected. Given a PD                        stone was the likely reason for acute pancreatitis, a                        5fr-9 cm single pigtail stent was placed under                        endoscopic and fluoroscopic guidance to drain the PD and                        help resolve the acute pancreatitis.                       -The bile duct was cannulated with the PD stent in                        place. Contrast was injected. I acquired and interpreted                        the images. The bile duct was dilated down to the                        ampulla.                       -A biliary sphincterotomy was made. This released a                        calcified intra-ampullary stone that was blocking the       biliary orifice and likely causing the abnormal LFTs.                       -A 10Fr-7 cm straight stent was placed in the bile duct                        to promote biliary drainage.  Recommendation:      - [facility]                       - 3L LRs bolus.                       - NPO tonight.       Patient is a 48y old  Male who presents with a chief complaint of Acute pancreatitis (09 Jan 2024 09:44)      SUBJECTIVE / OVERNIGHT EVENTS:  Patient seen with wife at bedside  Notes pain is better today to RUQ  LFT and ERPC results given to patient and wife, explained results  MRI will be done today and GI will see    MEDICATIONS  (STANDING):  chlorhexidine 2% Cloths 1 Application(s) Topical <User Schedule>  dextrose 5% + sodium chloride 0.9%. 1000 milliLiter(s) (75 mL/Hr) IV Continuous <Continuous>  dextrose 5%. 1000 milliLiter(s) (50 mL/Hr) IV Continuous <Continuous>  dextrose 5%. 1000 milliLiter(s) (100 mL/Hr) IV Continuous <Continuous>  dextrose 50% Injectable 25 Gram(s) IV Push once  dextrose 50% Injectable 25 Gram(s) IV Push once  dextrose 50% Injectable 12.5 Gram(s) IV Push once  enoxaparin Injectable 40 milliGRAM(s) SubCutaneous every 24 hours  famotidine Injectable 20 milliGRAM(s) IV Push every 12 hours  glucagon  Injectable 1 milliGRAM(s) IntraMuscular once  HYDROmorphone  Injectable 1 milliGRAM(s) IV Push once  insulin lispro (ADMELOG) corrective regimen sliding scale   SubCutaneous every 6 hours  ketorolac   Injectable 15 milliGRAM(s) IV Push every 6 hours  lactated ringers Bolus 2000 milliLiter(s) IV Bolus once  lactated ringers. 1000 milliLiter(s) (150 mL/Hr) IV Continuous <Continuous>  mupirocin 2% Ointment 1 Application(s) Topical every 12 hours  pantoprazole  Injectable 40 milliGRAM(s) IV Push once  polyethylene glycol 3350 17 Gram(s) Oral daily  potassium chloride    Tablet ER 40 milliEquivalent(s) Oral once    MEDICATIONS  (PRN):  ALPRAZolam 1 milliGRAM(s) Oral at bedtime PRN for anxiety  aluminum hydroxide/magnesium hydroxide/simethicone Suspension 30 milliLiter(s) Oral every 6 hours PRN Dyspepsia  dextrose Oral Gel 15 Gram(s) Oral once PRN Blood Glucose LESS THAN 70 milliGRAM(s)/deciliter  HYDROmorphone  Injectable 1.5 milliGRAM(s) IV Push every 3 hours PRN Severe Pain (7 - 10)  ondansetron Injectable 4 milliGRAM(s) IV Push every 8 hours PRN Nausea and/or Vomiting  saline laxative (FLEET) Rectal Enema 1 Enema Rectal daily PRN constipation  senna 2 Tablet(s) Oral at bedtime PRN Constipation  simethicone 80 milliGRAM(s) Chew daily PRN Gas        CAPILLARY BLOOD GLUCOSE  POCT Blood Glucose.: 114 mg/dL (10 Osvaldo 2024 06:09)  POCT Blood Glucose.: 119 mg/dL (09 Jan 2024 23:34)  POCT Blood Glucose.: 88 mg/dL (09 Jan 2024 21:04)  POCT Blood Glucose.: 82 mg/dL (09 Jan 2024 19:23)  POCT Blood Glucose.: 108 mg/dL (09 Jan 2024 16:49)  POCT Blood Glucose.: 124 mg/dL (09 Jan 2024 12:21)    I&O's Summary    09 Jan 2024 07:01  -  10 Osvaldo 2024 07:00  --------------------------------------------------------  IN: 300 mL / OUT: 1250 mL / NET: -950 mL      Vital Signs Last 24 Hrs  T(C): 36.4 (10 Osvaldo 2024 06:04), Max: 36.8 (09 Jan 2024 11:47)  T(F): 97.6 (10 Osvaldo 2024 06:04), Max: 98.3 (09 Jan 2024 11:47)  HR: 60 (10 Osvaldo 2024 06:04) (56 - 82)  BP: 136/64 (10 Osvaldo 2024 06:04) (128/95 - 178/90)  BP(mean): 97 (09 Jan 2024 21:00) (90 - 102)  RR: 18 (10 Osvaldo 2024 06:04) (13 - 20)  SpO2: 98% (10 Osvaldo 2024 06:04) (95% - 100%)    Parameters below as of 10 Osvaldo 2024 06:04  Patient On (Oxygen Delivery Method): room air      PHYSICAL EXAM:  GENERAL: NAD,   HEAD:  Atraumatic, Normocephalic  EYES: EOMI, PERRLA, conjunctiva and sclera clear  NECK: Supple, No JVD  CHEST/LUNG: Clear to auscultation bilaterally; No wheeze  HEART: Regular rate and rhythm; No murmurs, rubs, or gallops  ABDOMEN: Soft, n/t, positive bowel sounds.  EXTREMITIES:  2+ Peripheral Pulses, No clubbing, cyanosis, or edema  PSYCH: AAOx3  NEUROLOGY: non-focal  SKIN: No rashes or lesions    LABS:                        14.5   6.32  )-----------( 192      ( 10 Osvaldo 2024 06:00 )             41.5     01-10    141  |  105  |  4<L>  ----------------------------<  108<H>  3.4<L>   |  22  |  0.69    Ca    8.9      10 Osvaldo 2024 06:00  Phos  2.8     01-10  Mg     1.90     01-10    TPro  6.2  /  Alb  3.6  /  TBili  6.3<H>  /  DBili  x   /  AST  416<H>  /  ALT  689<H>  /  AlkPhos  283<H>  01-10          Urinalysis Basic - ( 10 Osvaldo 2024 06:00 )    Color: x / Appearance: x / SG: x / pH: x  Gluc: 108 mg/dL / Ketone: x  / Bili: x / Urobili: x   Blood: x / Protein: x / Nitrite: x   Leuk Esterase: x / RBC: x / WBC x   Sq Epi: x / Non Sq Epi: x / Bacteria: x        RADIOLOGY & ADDITIONAL TESTS:    Imaging Personally Reviewed:    Consultant(s) Notes Reviewed:      Care Discussed with Consultants/Other Providers:    < from: ERCP (01.09.24 @ 16:45) >  Impression:          EGD:                       -The esophagus was normal.                       -The stomach was erythematous. Biopsies were taken.                       -The duodenal bulb and D2 were normal.                     EUS: A linear scope was used.                       -The PD was dilated. There were intra-pancreatic duct                        stones (shadowing defects seen).                       -The CBD was dilated as well down to the ampulla.                       ERCP:                       -The duodenoscope was passed to the ampulla which was                        bulging. There was a calcific stone at the pancreatic                        orifice.                       -The PD was cannulated but not injected. Given a PD                        stone was the likely reason for acute pancreatitis, a                        5fr-9 cm single pigtail stent was placed under                        endoscopic and fluoroscopic guidance to drain the PD and                        help resolve the acute pancreatitis.                       -The bile duct was cannulated with the PD stent in                        place. Contrast was injected. I acquired and interpreted                        the images. The bile duct was dilated down to the                        ampulla.                       -A biliary sphincterotomy was made. This released a                        calcified intra-ampullary stone that was blocking the       biliary orifice and likely causing the abnormal LFTs.                       -A 10Fr-7 cm straight stent was placed in the bile duct                        to promote biliary drainage.  Recommendation:      - [facility].                       - 3L LRs bolus.                       - NPO tonight.

## 2024-01-10 NOTE — PROVIDER CONTACT NOTE (OTHER) - SITUATION
Refused AM labs
Pt declining fingerstick at this time.
/101
patient c/o severe pain. Q4 vitals obtained showed hypertenison
Pt's /81 and HR 54
Pt's /95
Regarding 2 L Lactated Ringer's Bolus

## 2024-01-10 NOTE — PROGRESS NOTE ADULT - ASSESSMENT
49 yo man with history of recurrent pancreatitis, ?asthma/COPD (not on home O2), rheumatoid arthritis (not on any meds),  type 2 DM (not on insulin), anxiety, and alcohol use disorder (sober for last ~20 years) presents with worsening epigastric pain that started shortly after pt was discharged from [facility] [place] [facility] on 12/19/23, admitted with acute pancreatitis. Pain syndrome and elevated lipase is consistent.     CT Abdomen and Pelvis w/ IV Cont (01.04.24 @ 15:45) >  IMPRESSION:  Mild interval worsening of acute pancreatitis with new pancreatic ductal   dilatation to level of theampulla.  Ductal dilatation could be due to an ampullary stricture or severe   inflammation of the periampullary duodenal wall.       47 yo man with history of recurrent pancreatitis, ?asthma/COPD (not on home O2), rheumatoid arthritis (not on any meds),  type 2 DM (not on insulin), anxiety, and alcohol use disorder (sober for last ~20 years) presents with worsening epigastric pain that started shortly after pt was discharged from [facility] on 12/19/23, admitted with acute pancreatitis. Pain syndrome and elevated lipase is consistent.     CT Abdomen and Pelvis w/ IV Cont (01.04.24 @ 15:45) >  IMPRESSION:  Mild interval worsening of acute pancreatitis with new pancreatic ductal   dilatation to level of theampulla.  Ductal dilatation could be due to an ampullary stricture or severe   inflammation of the periampullary duodenal wall.

## 2024-01-10 NOTE — PROVIDER CONTACT NOTE (OTHER) - ASSESSMENT
pt c/o abdominal pain stating morphine does not work, also complaining of gas pains
Pt A&O4, tolerating clear liquids well.
Pt is complaining of abdominal pain rated 10/10. BP recheck 142/78
Pt is complaining of abdominal pain, denies chest pain or headache.
Pt stated " I don't want to Lab draw just Pain medication"
A&O4, pt is unsure whether he received 2L of LR following ERCP on 1/9. LR bolus not scanned out on eMAR, RN consulted ACP Kel regarding whether to administer, ACP stated to proceed. Pt asked to receive 1L at 1600 & receive remaining liter at 1900. ACP Quintin reports pt had received LR bolus in PACU following ERCP 1/9. Pt denying SOB, dizziness, lungs clear & equal bilaterally, no swelling noted.
Pt is complaining of abdominal pain, denies chest pain or headache. /81 when BP rechecked.

## 2024-01-11 LAB
ALBUMIN SERPL ELPH-MCNC: 3.3 G/DL — SIGNIFICANT CHANGE UP (ref 3.3–5)
ALBUMIN SERPL ELPH-MCNC: 3.3 G/DL — SIGNIFICANT CHANGE UP (ref 3.3–5)
ALP SERPL-CCNC: 274 U/L — HIGH (ref 40–120)
ALP SERPL-CCNC: 274 U/L — HIGH (ref 40–120)
ALT FLD-CCNC: 598 U/L — HIGH (ref 4–41)
ALT FLD-CCNC: 598 U/L — HIGH (ref 4–41)
ANION GAP SERPL CALC-SCNC: 12 MMOL/L — SIGNIFICANT CHANGE UP (ref 7–14)
ANION GAP SERPL CALC-SCNC: 12 MMOL/L — SIGNIFICANT CHANGE UP (ref 7–14)
AST SERPL-CCNC: 261 U/L — HIGH (ref 4–40)
AST SERPL-CCNC: 261 U/L — HIGH (ref 4–40)
BILIRUB DIRECT SERPL-MCNC: 1.4 MG/DL — HIGH (ref 0–0.3)
BILIRUB DIRECT SERPL-MCNC: 1.4 MG/DL — HIGH (ref 0–0.3)
BILIRUB INDIRECT FLD-MCNC: 1.3 MG/DL — HIGH (ref 0–1)
BILIRUB INDIRECT FLD-MCNC: 1.3 MG/DL — HIGH (ref 0–1)
BILIRUB SERPL-MCNC: 2.7 MG/DL — HIGH (ref 0.2–1.2)
BILIRUB SERPL-MCNC: 2.7 MG/DL — HIGH (ref 0.2–1.2)
BUN SERPL-MCNC: 5 MG/DL — LOW (ref 7–23)
BUN SERPL-MCNC: 5 MG/DL — LOW (ref 7–23)
CALCIUM SERPL-MCNC: 8.7 MG/DL — SIGNIFICANT CHANGE UP (ref 8.4–10.5)
CALCIUM SERPL-MCNC: 8.7 MG/DL — SIGNIFICANT CHANGE UP (ref 8.4–10.5)
CHLORIDE SERPL-SCNC: 106 MMOL/L — SIGNIFICANT CHANGE UP (ref 98–107)
CHLORIDE SERPL-SCNC: 106 MMOL/L — SIGNIFICANT CHANGE UP (ref 98–107)
CO2 SERPL-SCNC: 25 MMOL/L — SIGNIFICANT CHANGE UP (ref 22–31)
CO2 SERPL-SCNC: 25 MMOL/L — SIGNIFICANT CHANGE UP (ref 22–31)
CREAT SERPL-MCNC: 0.74 MG/DL — SIGNIFICANT CHANGE UP (ref 0.5–1.3)
CREAT SERPL-MCNC: 0.74 MG/DL — SIGNIFICANT CHANGE UP (ref 0.5–1.3)
EGFR: 110 ML/MIN/1.73M2 — SIGNIFICANT CHANGE UP
EGFR: 110 ML/MIN/1.73M2 — SIGNIFICANT CHANGE UP
GLUCOSE SERPL-MCNC: 106 MG/DL — HIGH (ref 70–99)
GLUCOSE SERPL-MCNC: 106 MG/DL — HIGH (ref 70–99)
HCT VFR BLD CALC: 42.2 % — SIGNIFICANT CHANGE UP (ref 39–50)
HCT VFR BLD CALC: 42.2 % — SIGNIFICANT CHANGE UP (ref 39–50)
HGB BLD-MCNC: 14.5 G/DL — SIGNIFICANT CHANGE UP (ref 13–17)
HGB BLD-MCNC: 14.5 G/DL — SIGNIFICANT CHANGE UP (ref 13–17)
MAGNESIUM SERPL-MCNC: 1.9 MG/DL — SIGNIFICANT CHANGE UP (ref 1.6–2.6)
MAGNESIUM SERPL-MCNC: 1.9 MG/DL — SIGNIFICANT CHANGE UP (ref 1.6–2.6)
MCHC RBC-ENTMCNC: 27.9 PG — SIGNIFICANT CHANGE UP (ref 27–34)
MCHC RBC-ENTMCNC: 27.9 PG — SIGNIFICANT CHANGE UP (ref 27–34)
MCHC RBC-ENTMCNC: 34.4 GM/DL — SIGNIFICANT CHANGE UP (ref 32–36)
MCHC RBC-ENTMCNC: 34.4 GM/DL — SIGNIFICANT CHANGE UP (ref 32–36)
MCV RBC AUTO: 81.2 FL — SIGNIFICANT CHANGE UP (ref 80–100)
MCV RBC AUTO: 81.2 FL — SIGNIFICANT CHANGE UP (ref 80–100)
NRBC # BLD: 0 /100 WBCS — SIGNIFICANT CHANGE UP (ref 0–0)
NRBC # BLD: 0 /100 WBCS — SIGNIFICANT CHANGE UP (ref 0–0)
NRBC # FLD: 0 K/UL — SIGNIFICANT CHANGE UP (ref 0–0)
NRBC # FLD: 0 K/UL — SIGNIFICANT CHANGE UP (ref 0–0)
PHOSPHATE SERPL-MCNC: 3 MG/DL — SIGNIFICANT CHANGE UP (ref 2.5–4.5)
PHOSPHATE SERPL-MCNC: 3 MG/DL — SIGNIFICANT CHANGE UP (ref 2.5–4.5)
PLATELET # BLD AUTO: 197 K/UL — SIGNIFICANT CHANGE UP (ref 150–400)
PLATELET # BLD AUTO: 197 K/UL — SIGNIFICANT CHANGE UP (ref 150–400)
POTASSIUM SERPL-MCNC: 3.5 MMOL/L — SIGNIFICANT CHANGE UP (ref 3.5–5.3)
POTASSIUM SERPL-MCNC: 3.5 MMOL/L — SIGNIFICANT CHANGE UP (ref 3.5–5.3)
POTASSIUM SERPL-SCNC: 3.5 MMOL/L — SIGNIFICANT CHANGE UP (ref 3.5–5.3)
POTASSIUM SERPL-SCNC: 3.5 MMOL/L — SIGNIFICANT CHANGE UP (ref 3.5–5.3)
PROT SERPL-MCNC: 6.2 G/DL — SIGNIFICANT CHANGE UP (ref 6–8.3)
PROT SERPL-MCNC: 6.2 G/DL — SIGNIFICANT CHANGE UP (ref 6–8.3)
RBC # BLD: 5.2 M/UL — SIGNIFICANT CHANGE UP (ref 4.2–5.8)
RBC # BLD: 5.2 M/UL — SIGNIFICANT CHANGE UP (ref 4.2–5.8)
RBC # FLD: 14 % — SIGNIFICANT CHANGE UP (ref 10.3–14.5)
RBC # FLD: 14 % — SIGNIFICANT CHANGE UP (ref 10.3–14.5)
SODIUM SERPL-SCNC: 143 MMOL/L — SIGNIFICANT CHANGE UP (ref 135–145)
SODIUM SERPL-SCNC: 143 MMOL/L — SIGNIFICANT CHANGE UP (ref 135–145)
WBC # BLD: 6.39 K/UL — SIGNIFICANT CHANGE UP (ref 3.8–10.5)
WBC # BLD: 6.39 K/UL — SIGNIFICANT CHANGE UP (ref 3.8–10.5)
WBC # FLD AUTO: 6.39 K/UL — SIGNIFICANT CHANGE UP (ref 3.8–10.5)
WBC # FLD AUTO: 6.39 K/UL — SIGNIFICANT CHANGE UP (ref 3.8–10.5)

## 2024-01-11 PROCEDURE — 99232 SBSQ HOSP IP/OBS MODERATE 35: CPT | Mod: GC

## 2024-01-11 PROCEDURE — 99232 SBSQ HOSP IP/OBS MODERATE 35: CPT

## 2024-01-11 RX ORDER — HYDROMORPHONE HYDROCHLORIDE 2 MG/ML
0.5 INJECTION INTRAMUSCULAR; INTRAVENOUS; SUBCUTANEOUS ONCE
Refills: 0 | Status: DISCONTINUED | OUTPATIENT
Start: 2024-01-11 | End: 2024-01-11

## 2024-01-11 RX ORDER — IBUPROFEN 200 MG
400 TABLET ORAL EVERY 6 HOURS
Refills: 0 | Status: DISCONTINUED | OUTPATIENT
Start: 2024-01-11 | End: 2024-01-12

## 2024-01-11 RX ADMIN — Medication 1 MILLIGRAM(S): at 00:05

## 2024-01-11 RX ADMIN — FAMOTIDINE 20 MILLIGRAM(S): 10 INJECTION INTRAVENOUS at 06:03

## 2024-01-11 RX ADMIN — MUPIROCIN 1 APPLICATION(S): 20 OINTMENT TOPICAL at 22:13

## 2024-01-11 RX ADMIN — HYDROMORPHONE HYDROCHLORIDE 0.5 MILLIGRAM(S): 2 INJECTION INTRAMUSCULAR; INTRAVENOUS; SUBCUTANEOUS at 02:00

## 2024-01-11 RX ADMIN — HYDROMORPHONE HYDROCHLORIDE 0.5 MILLIGRAM(S): 2 INJECTION INTRAMUSCULAR; INTRAVENOUS; SUBCUTANEOUS at 01:35

## 2024-01-11 RX ADMIN — CHLORHEXIDINE GLUCONATE 1 APPLICATION(S): 213 SOLUTION TOPICAL at 12:41

## 2024-01-11 RX ADMIN — HYDROMORPHONE HYDROCHLORIDE 1.5 MILLIGRAM(S): 2 INJECTION INTRAMUSCULAR; INTRAVENOUS; SUBCUTANEOUS at 06:15

## 2024-01-11 RX ADMIN — MUPIROCIN 1 APPLICATION(S): 20 OINTMENT TOPICAL at 09:09

## 2024-01-11 RX ADMIN — FAMOTIDINE 20 MILLIGRAM(S): 10 INJECTION INTRAVENOUS at 18:18

## 2024-01-11 RX ADMIN — HYDROMORPHONE HYDROCHLORIDE 1.5 MILLIGRAM(S): 2 INJECTION INTRAMUSCULAR; INTRAVENOUS; SUBCUTANEOUS at 06:03

## 2024-01-11 RX ADMIN — POLYETHYLENE GLYCOL 3350 17 GRAM(S): 17 POWDER, FOR SOLUTION ORAL at 12:40

## 2024-01-11 NOTE — PROGRESS NOTE ADULT - SUBJECTIVE AND OBJECTIVE BOX
Gastroenterology/Hepatology Progress Note    Interval Events:   - patient doing well   - tolerating diet well   - no abd pain     Allergies:  No Known Allergies      Hospital Medications:  aluminum hydroxide/magnesium hydroxide/simethicone Suspension 30 milliLiter(s) Oral every 6 hours PRN  chlorhexidine 2% Cloths 1 Application(s) Topical <User Schedule>  dextrose 5% + sodium chloride 0.9%. 1000 milliLiter(s) IV Continuous <Continuous>  dextrose 5%. 1000 milliLiter(s) IV Continuous <Continuous>  dextrose 5%. 1000 milliLiter(s) IV Continuous <Continuous>  dextrose 50% Injectable 12.5 Gram(s) IV Push once  dextrose 50% Injectable 25 Gram(s) IV Push once  dextrose 50% Injectable 25 Gram(s) IV Push once  dextrose Oral Gel 15 Gram(s) Oral once PRN  enoxaparin Injectable 40 milliGRAM(s) SubCutaneous every 24 hours  famotidine Injectable 20 milliGRAM(s) IV Push every 12 hours  glucagon  Injectable 1 milliGRAM(s) IntraMuscular once  HYDROmorphone  Injectable 1.5 milliGRAM(s) IV Push every 3 hours PRN  insulin lispro (ADMELOG) corrective regimen sliding scale   SubCutaneous every 6 hours  lactated ringers. 1000 milliLiter(s) IV Continuous <Continuous>  mupirocin 2% Ointment 1 Application(s) Topical every 12 hours  ondansetron Injectable 4 milliGRAM(s) IV Push every 8 hours PRN  pantoprazole  Injectable 40 milliGRAM(s) IV Push once  polyethylene glycol 3350 17 Gram(s) Oral daily  saline laxative (FLEET) Rectal Enema 1 Enema Rectal daily PRN  senna 2 Tablet(s) Oral at bedtime PRN  simethicone 80 milliGRAM(s) Chew daily PRN      ROS: 14 point ROS negative unless otherwise state in subjective    PHYSICAL EXAM:   Vital Signs:  Vital Signs Last 24 Hrs  T(C): 36.8 (11 Jan 2024 06:12), Max: 36.8 (11 Jan 2024 06:12)  T(F): 98.3 (11 Jan 2024 06:12), Max: 98.3 (11 Jan 2024 06:12)  HR: 58 (11 Jan 2024 06:12) (58 - 65)  BP: 123/75 (11 Jan 2024 06:12) (121/77 - 151/85)  BP(mean): --  RR: 17 (11 Jan 2024 06:12) (17 - 17)  SpO2: 98% (11 Jan 2024 06:12) (96% - 98%)    Parameters below as of 11 Jan 2024 06:12  Patient On (Oxygen Delivery Method): room air      Daily     Daily     GENERAL:  No acute distress  HEENT:  NCAT, +scleral icterus  CHEST: no resp distress  HEART:  RRR  ABDOMEN:  Soft, non-tender, non-distended, no masses  EXTREMITIES:  No cyanosis, clubbing, or edema  SKIN:  No rash/erythema/ecchymoses/petechiae/wounds/abscess/warm/dry. Jaundiced.   NEURO:  Alert and oriented x 3     LABS:                        14.5   6.39  )-----------( 197      ( 11 Jan 2024 07:16 )             42.2     Mean Cell Volume: 81.2 fL (01-11-24 @ 07:16)    01-11    143  |  106  |  5<L>  ----------------------------<  106<H>  3.5   |  25  |  0.74    Ca    8.7      11 Jan 2024 07:16  Phos  3.0     01-11  Mg     1.90     01-11    TPro  6.2  /  Alb  3.6  /  TBili  6.3<H>  /  DBili  x   /  AST  416<H>  /  ALT  689<H>  /  AlkPhos  283<H>  01-10    LIVER FUNCTIONS - ( 10 Osvaldo 2024 06:00 )  Alb: 3.6 g/dL / Pro: 6.2 g/dL / ALK PHOS: 283 U/L / ALT: 689 U/L / AST: 416 U/L / GGT: x             Urinalysis Basic - ( 11 Jan 2024 07:16 )    Color: x / Appearance: x / SG: x / pH: x  Gluc: 106 mg/dL / Ketone: x  / Bili: x / Urobili: x   Blood: x / Protein: x / Nitrite: x   Leuk Esterase: x / RBC: x / WBC x   Sq Epi: x / Non Sq Epi: x / Bacteria: x            Imaging:

## 2024-01-11 NOTE — PROGRESS NOTE ADULT - PROBLEM SELECTOR PLAN 2
more elevated today than the past 2 days  concern for ductal narrowing  will discuss with GI if EUS is needed sooner  dc tylenol.  will consider RUQ u/s  NEED MRCP----> called department for HELP  Hepatology consult requested 1/8 1/9 GI may do EUS today.  Gi greatly appreciated  1/10 EUS as above. Tbili 6.3. f/u MRCP today and GI Hepatology consult requested 1/8 1/9 GI did EUS and ERCP  Gi greatly appreciated  1/10 EUS as above. Tbili 6.3. f/u MRCP today and GI  1/11 Resolving LFT, T bili 2.7

## 2024-01-11 NOTE — PROGRESS NOTE ADULT - SUBJECTIVE AND OBJECTIVE BOX
Patient is a 48y old  Male who presents with a chief complaint of Acute pancreatitis (11 Jan 2024 08:07)      SUBJECTIVE / OVERNIGHT EVENTS:    MEDICATIONS  (STANDING):  chlorhexidine 2% Cloths 1 Application(s) Topical <User Schedule>  dextrose 5% + sodium chloride 0.9%. 1000 milliLiter(s) (75 mL/Hr) IV Continuous <Continuous>  dextrose 5%. 1000 milliLiter(s) (50 mL/Hr) IV Continuous <Continuous>  dextrose 5%. 1000 milliLiter(s) (100 mL/Hr) IV Continuous <Continuous>  dextrose 50% Injectable 25 Gram(s) IV Push once  dextrose 50% Injectable 25 Gram(s) IV Push once  dextrose 50% Injectable 12.5 Gram(s) IV Push once  enoxaparin Injectable 40 milliGRAM(s) SubCutaneous every 24 hours  famotidine Injectable 20 milliGRAM(s) IV Push every 12 hours  glucagon  Injectable 1 milliGRAM(s) IntraMuscular once  insulin lispro (ADMELOG) corrective regimen sliding scale   SubCutaneous every 6 hours  lactated ringers. 1000 milliLiter(s) (150 mL/Hr) IV Continuous <Continuous>  mupirocin 2% Ointment 1 Application(s) Topical every 12 hours  pantoprazole  Injectable 40 milliGRAM(s) IV Push once  polyethylene glycol 3350 17 Gram(s) Oral daily    MEDICATIONS  (PRN):  aluminum hydroxide/magnesium hydroxide/simethicone Suspension 30 milliLiter(s) Oral every 6 hours PRN Dyspepsia  dextrose Oral Gel 15 Gram(s) Oral once PRN Blood Glucose LESS THAN 70 milliGRAM(s)/deciliter  HYDROmorphone  Injectable 1.5 milliGRAM(s) IV Push every 3 hours PRN Severe Pain (7 - 10)  ondansetron Injectable 4 milliGRAM(s) IV Push every 8 hours PRN Nausea and/or Vomiting  saline laxative (FLEET) Rectal Enema 1 Enema Rectal daily PRN constipation  senna 2 Tablet(s) Oral at bedtime PRN Constipation  simethicone 80 milliGRAM(s) Chew daily PRN Gas      POCT Blood Glucose.: 115 mg/dL (10 Osvaldo 2024 12:03)    I&O's Summary    10 Osvaldo 2024 07:01  -  11 Jan 2024 07:00  --------------------------------------------------------  IN: 2100 mL / OUT: 0 mL / NET: 2100 mL      Vital Signs Last 24 Hrs  T(C): 36.8 (11 Jan 2024 06:12), Max: 36.8 (11 Jan 2024 06:12)  T(F): 98.3 (11 Jan 2024 06:12), Max: 98.3 (11 Jan 2024 06:12)  HR: 58 (11 Jan 2024 06:12) (58 - 65)  BP: 123/75 (11 Jan 2024 06:12) (121/77 - 151/85)  BP(mean): --  RR: 17 (11 Jan 2024 06:12) (17 - 17)  SpO2: 98% (11 Jan 2024 06:12) (96% - 98%)    Parameters below as of 11 Jan 2024 06:12  Patient On (Oxygen Delivery Method): room air      PHYSICAL EXAM:  GENERAL: NAD,   HEAD:  Atraumatic, Normocephalic  EYES: EOMI, PERRLA, conjunctiva and sclera clear  NECK: Supple, No JVD  CHEST/LUNG: Clear to auscultation bilaterally; No wheeze  HEART: Regular rate and rhythm; No murmurs, rubs, or gallops  ABDOMEN: Soft, Nontender, Nondistended; Bowel sounds present  EXTREMITIES:  2+ Peripheral Pulses, No clubbing, cyanosis, or edema  PSYCH: AAOx3  NEUROLOGY: non-focal  SKIN: No rashes or lesions    LABS:                        14.5   6.39  )-----------( 197      ( 11 Jan 2024 07:16 )             42.2     01-11    143  |  106  |  5<L>  ----------------------------<  106<H>  3.5   |  25  |  0.74    Ca    8.7      11 Jan 2024 07:16  Phos  3.0     01-11  Mg     1.90     01-11    TPro  6.2  /  Alb  3.3  /  TBili  2.7<H>  /  DBili  1.4<H>  /  AST  261<H>  /  ALT  598<H>  /  AlkPhos  274<H>  01-11      Care Discussed with Consultants/Other Providers:  MRCP done---> results Pending, called department       Gi f/u appreciated  Recommendations  - continue to trend transaminases   - can advance diet as tolerated   - if any fevers or leukocytosis, start empiric antibiotics  - continue miralax if able to tolerate  - out of bed and ambulating as tolerating  - will need repeat ERCP in 3 months   - Please provide patient with Gastroenterology Clinic outpatient follow up number for an appointment [phone] ([facility])   - continue pain control and anti-emetics   Patient is a 48y old  Male who presents with a chief complaint of Acute pancreatitis (11 Jan 2024 08:07)      SUBJECTIVE / OVERNIGHT EVENTS:    MEDICATIONS  (STANDING):  chlorhexidine 2% Cloths 1 Application(s) Topical <User Schedule>  dextrose 5% + sodium chloride 0.9%. 1000 milliLiter(s) (75 mL/Hr) IV Continuous <Continuous>  dextrose 5%. 1000 milliLiter(s) (50 mL/Hr) IV Continuous <Continuous>  dextrose 5%. 1000 milliLiter(s) (100 mL/Hr) IV Continuous <Continuous>  dextrose 50% Injectable 25 Gram(s) IV Push once  dextrose 50% Injectable 25 Gram(s) IV Push once  dextrose 50% Injectable 12.5 Gram(s) IV Push once  enoxaparin Injectable 40 milliGRAM(s) SubCutaneous every 24 hours  famotidine Injectable 20 milliGRAM(s) IV Push every 12 hours  glucagon  Injectable 1 milliGRAM(s) IntraMuscular once  insulin lispro (ADMELOG) corrective regimen sliding scale   SubCutaneous every 6 hours  lactated ringers. 1000 milliLiter(s) (150 mL/Hr) IV Continuous <Continuous>  mupirocin 2% Ointment 1 Application(s) Topical every 12 hours  pantoprazole  Injectable 40 milliGRAM(s) IV Push once  polyethylene glycol 3350 17 Gram(s) Oral daily    MEDICATIONS  (PRN):  aluminum hydroxide/magnesium hydroxide/simethicone Suspension 30 milliLiter(s) Oral every 6 hours PRN Dyspepsia  dextrose Oral Gel 15 Gram(s) Oral once PRN Blood Glucose LESS THAN 70 milliGRAM(s)/deciliter  HYDROmorphone  Injectable 1.5 milliGRAM(s) IV Push every 3 hours PRN Severe Pain (7 - 10)  ondansetron Injectable 4 milliGRAM(s) IV Push every 8 hours PRN Nausea and/or Vomiting  saline laxative (FLEET) Rectal Enema 1 Enema Rectal daily PRN constipation  senna 2 Tablet(s) Oral at bedtime PRN Constipation  simethicone 80 milliGRAM(s) Chew daily PRN Gas      POCT Blood Glucose.: 115 mg/dL (10 Osvaldo 2024 12:03)    I&O's Summary    10 Osvaldo 2024 07:01  -  11 Jan 2024 07:00  --------------------------------------------------------  IN: 2100 mL / OUT: 0 mL / NET: 2100 mL      Vital Signs Last 24 Hrs  T(C): 36.8 (11 Jan 2024 06:12), Max: 36.8 (11 Jan 2024 06:12)  T(F): 98.3 (11 Jan 2024 06:12), Max: 98.3 (11 Jan 2024 06:12)  HR: 58 (11 Jan 2024 06:12) (58 - 65)  BP: 123/75 (11 Jan 2024 06:12) (121/77 - 151/85)  BP(mean): --  RR: 17 (11 Jan 2024 06:12) (17 - 17)  SpO2: 98% (11 Jan 2024 06:12) (96% - 98%)    Parameters below as of 11 Jan 2024 06:12  Patient On (Oxygen Delivery Method): room air      PHYSICAL EXAM:  GENERAL: NAD,   HEAD:  Atraumatic, Normocephalic  EYES: EOMI, PERRLA, conjunctiva and sclera clear  NECK: Supple, No JVD  CHEST/LUNG: Clear to auscultation bilaterally; No wheeze  HEART: Regular rate and rhythm; No murmurs, rubs, or gallops  ABDOMEN: Soft, Nontender, Nondistended; Bowel sounds present  EXTREMITIES:  2+ Peripheral Pulses, No clubbing, cyanosis, or edema  PSYCH: AAOx3  NEUROLOGY: non-focal  SKIN: No rashes or lesions    LABS:                        14.5   6.39  )-----------( 197      ( 11 Jan 2024 07:16 )             42.2     01-11    143  |  106  |  5<L>  ----------------------------<  106<H>  3.5   |  25  |  0.74    Ca    8.7      11 Jan 2024 07:16  Phos  3.0     01-11  Mg     1.90     01-11    TPro  6.2  /  Alb  3.3  /  TBili  2.7<H>  /  DBili  1.4<H>  /  AST  261<H>  /  ALT  598<H>  /  AlkPhos  274<H>  01-11      Care Discussed with Consultants/Other Providers:  MRCP done---> results Pending, called department       Gi f/u appreciated  Recommendations  - continue to trend transaminases   - can advance diet as tolerated   - if any fevers or leukocytosis, start empiric antibiotics  - continue miralax if able to tolerate  - out of bed and ambulating as tolerating  - will need repeat ERCP in 3 months   - Please provide patient with Gastroenterology Clinic outpatient follow up number for an appointment [phone] ([facility])   - continue pain control and anti-emetics   Patient is a 48y old  Male who presents with a chief complaint of Acute pancreatitis (11 Jan 2024 08:07)      SUBJECTIVE / OVERNIGHT EVENTS:  Notes no more pain today.  Will advance diet to full liquid today and f/u MRI--> results Pending.  Plan for regular diet Friday    MEDICATIONS  (STANDING):  chlorhexidine 2% Cloths 1 Application(s) Topical <User Schedule>  dextrose 5% + sodium chloride 0.9%. 1000 milliLiter(s) (75 mL/Hr) IV Continuous <Continuous>  dextrose 5%. 1000 milliLiter(s) (50 mL/Hr) IV Continuous <Continuous>  dextrose 5%. 1000 milliLiter(s) (100 mL/Hr) IV Continuous <Continuous>  dextrose 50% Injectable 25 Gram(s) IV Push once  dextrose 50% Injectable 25 Gram(s) IV Push once  dextrose 50% Injectable 12.5 Gram(s) IV Push once  enoxaparin Injectable 40 milliGRAM(s) SubCutaneous every 24 hours  famotidine Injectable 20 milliGRAM(s) IV Push every 12 hours  glucagon  Injectable 1 milliGRAM(s) IntraMuscular once  insulin lispro (ADMELOG) corrective regimen sliding scale   SubCutaneous every 6 hours  lactated ringers. 1000 milliLiter(s) (150 mL/Hr) IV Continuous <Continuous>  mupirocin 2% Ointment 1 Application(s) Topical every 12 hours  pantoprazole  Injectable 40 milliGRAM(s) IV Push once  polyethylene glycol 3350 17 Gram(s) Oral daily    MEDICATIONS  (PRN):  aluminum hydroxide/magnesium hydroxide/simethicone Suspension 30 milliLiter(s) Oral every 6 hours PRN Dyspepsia  dextrose Oral Gel 15 Gram(s) Oral once PRN Blood Glucose LESS THAN 70 milliGRAM(s)/deciliter  HYDROmorphone  Injectable 1.5 milliGRAM(s) IV Push every 3 hours PRN Severe Pain (7 - 10)  ondansetron Injectable 4 milliGRAM(s) IV Push every 8 hours PRN Nausea and/or Vomiting  saline laxative (FLEET) Rectal Enema 1 Enema Rectal daily PRN constipation  senna 2 Tablet(s) Oral at bedtime PRN Constipation  simethicone 80 milliGRAM(s) Chew daily PRN Gas      POCT Blood Glucose.: 115 mg/dL (10 Osvaldo 2024 12:03)    I&O's Summary    10 Osvaldo 2024 07:01  -  11 Jan 2024 07:00  --------------------------------------------------------  IN: 2100 mL / OUT: 0 mL / NET: 2100 mL      Vital Signs Last 24 Hrs  T(C): 36.8 (11 Jan 2024 06:12), Max: 36.8 (11 Jan 2024 06:12)  T(F): 98.3 (11 Jan 2024 06:12), Max: 98.3 (11 Jan 2024 06:12)  HR: 58 (11 Jan 2024 06:12) (58 - 65)  BP: 123/75 (11 Jan 2024 06:12) (121/77 - 151/85)  BP(mean): --  RR: 17 (11 Jan 2024 06:12) (17 - 17)  SpO2: 98% (11 Jan 2024 06:12) (96% - 98%)    Parameters below as of 11 Jan 2024 06:12  Patient On (Oxygen Delivery Method): room air      PHYSICAL EXAM:  GENERAL: NAD,   HEAD:  Atraumatic, Normocephalic  EYES: EOMI, PERRLA, conjunctiva and sclera clear  NECK: Supple, No JVD  CHEST/LUNG: Clear to auscultation bilaterally; No wheeze  HEART: Regular rate and rhythm; No murmurs, rubs, or gallops  ABDOMEN: Soft, Nontender, Nondistended; Bowel sounds present  EXTREMITIES:  2+ Peripheral Pulses, No clubbing, cyanosis, or edema  PSYCH: AAOx3  NEUROLOGY: non-focal  SKIN: No rashes or lesions    LABS:                        14.5   6.39  )-----------( 197      ( 11 Jan 2024 07:16 )             42.2     01-11    143  |  106  |  5<L>  ----------------------------<  106<H>  3.5   |  25  |  0.74    Ca    8.7      11 Jan 2024 07:16  Phos  3.0     01-11  Mg     1.90     01-11    TPro  6.2  /  Alb  3.3  /  TBili  2.7<H>  /  DBili  1.4<H>  /  AST  261<H>  /  ALT  598<H>  /  AlkPhos  274<H>  01-11      Care Discussed with Consultants/Other Providers:  MRCP done---> results Pending, called department     < from: ERCP (01.09.24 @ 16:45) >  ERCP:                       -The duodenoscope was passed to the ampulla which was                        bulging. There was a calcific stone at the pancreatic                        orifice.                       -The PD was cannulated but not injected. Given a PD                        stone was the likely reason for acute pancreatitis, a                        5fr-9 cm single pigtail stent was placed under                        endoscopic and fluoroscopic guidance to drain the PD and                        help resolve the acute pancreatitis.                       -The bile duct was cannulated with the PD stent in                        place. Contrast was injected. I acquired and interpreted                        the images. The bile duct was dilated down to the                        ampulla.                       -A biliary sphincterotomy was made. This released a                        calcified intra-ampullary stone that was blocking the       biliary orifice and likely causing the abnormal LFTs.                       -A 10Fr-7 cm straight stent was placed in the bile duct                        to promote biliary drainage.  Recommendation:      - [facility].                       - 3L LRs bolus.                       - NPO tonight.    < end of copied text >      Gi f/u appreciated  Recommendations  - continue to trend transaminases   - can advance diet as tolerated   - if any fevers or leukocytosis, start empiric antibiotics  - continue miralax if able to tolerate  - out of bed and ambulating as tolerating  - will need repeat ERCP in 3 months   - Please provide patient with [facility]enterology Clinic outpatient follow up number for an appointment [phone] ([facility])   - continue pain control and anti-emetics   Patient is a 48y old  Male who presents with a chief complaint of Acute pancreatitis (11 Jan 2024 08:07)      SUBJECTIVE / OVERNIGHT EVENTS:  Notes no more pain today.  Will advance diet to full liquid today and f/u MRI--> results Pending.  Plan for regular diet Friday    MEDICATIONS  (STANDING):  chlorhexidine 2% Cloths 1 Application(s) Topical <User Schedule>  dextrose 5% + sodium chloride 0.9%. 1000 milliLiter(s) (75 mL/Hr) IV Continuous <Continuous>  dextrose 5%. 1000 milliLiter(s) (50 mL/Hr) IV Continuous <Continuous>  dextrose 5%. 1000 milliLiter(s) (100 mL/Hr) IV Continuous <Continuous>  dextrose 50% Injectable 25 Gram(s) IV Push once  dextrose 50% Injectable 25 Gram(s) IV Push once  dextrose 50% Injectable 12.5 Gram(s) IV Push once  enoxaparin Injectable 40 milliGRAM(s) SubCutaneous every 24 hours  famotidine Injectable 20 milliGRAM(s) IV Push every 12 hours  glucagon  Injectable 1 milliGRAM(s) IntraMuscular once  insulin lispro (ADMELOG) corrective regimen sliding scale   SubCutaneous every 6 hours  lactated ringers. 1000 milliLiter(s) (150 mL/Hr) IV Continuous <Continuous>  mupirocin 2% Ointment 1 Application(s) Topical every 12 hours  pantoprazole  Injectable 40 milliGRAM(s) IV Push once  polyethylene glycol 3350 17 Gram(s) Oral daily    MEDICATIONS  (PRN):  aluminum hydroxide/magnesium hydroxide/simethicone Suspension 30 milliLiter(s) Oral every 6 hours PRN Dyspepsia  dextrose Oral Gel 15 Gram(s) Oral once PRN Blood Glucose LESS THAN 70 milliGRAM(s)/deciliter  HYDROmorphone  Injectable 1.5 milliGRAM(s) IV Push every 3 hours PRN Severe Pain (7 - 10)  ondansetron Injectable 4 milliGRAM(s) IV Push every 8 hours PRN Nausea and/or Vomiting  saline laxative (FLEET) Rectal Enema 1 Enema Rectal daily PRN constipation  senna 2 Tablet(s) Oral at bedtime PRN Constipation  simethicone 80 milliGRAM(s) Chew daily PRN Gas      POCT Blood Glucose.: 115 mg/dL (10 Osvaldo 2024 12:03)    I&O's Summary    10 Osvaldo 2024 07:01  -  11 Jan 2024 07:00  --------------------------------------------------------  IN: 2100 mL / OUT: 0 mL / NET: 2100 mL      Vital Signs Last 24 Hrs  T(C): 36.8 (11 Jan 2024 06:12), Max: 36.8 (11 Jan 2024 06:12)  T(F): 98.3 (11 Jan 2024 06:12), Max: 98.3 (11 Jan 2024 06:12)  HR: 58 (11 Jan 2024 06:12) (58 - 65)  BP: 123/75 (11 Jan 2024 06:12) (121/77 - 151/85)  BP(mean): --  RR: 17 (11 Jan 2024 06:12) (17 - 17)  SpO2: 98% (11 Jan 2024 06:12) (96% - 98%)    Parameters below as of 11 Jan 2024 06:12  Patient On (Oxygen Delivery Method): room air      PHYSICAL EXAM:  GENERAL: NAD,   HEAD:  Atraumatic, Normocephalic  EYES: EOMI, PERRLA, conjunctiva and sclera clear  NECK: Supple, No JVD  CHEST/LUNG: Clear to auscultation bilaterally; No wheeze  HEART: Regular rate and rhythm; No murmurs, rubs, or gallops  ABDOMEN: Soft, Nontender, Nondistended; Bowel sounds present  EXTREMITIES:  2+ Peripheral Pulses, No clubbing, cyanosis, or edema  PSYCH: AAOx3  NEUROLOGY: non-focal  SKIN: No rashes or lesions    LABS:                        14.5   6.39  )-----------( 197      ( 11 Jan 2024 07:16 )             42.2     01-11    143  |  106  |  5<L>  ----------------------------<  106<H>  3.5   |  25  |  0.74    Ca    8.7      11 Jan 2024 07:16  Phos  3.0     01-11  Mg     1.90     01-11    TPro  6.2  /  Alb  3.3  /  TBili  2.7<H>  /  DBili  1.4<H>  /  AST  261<H>  /  ALT  598<H>  /  AlkPhos  274<H>  01-11      Care Discussed with Consultants/Other Providers:  MRCP done---> results Pending, called department     < from: ERCP (01.09.24 @ 16:45) >  ERCP:                       -The duodenoscope was passed to the ampulla which was                        bulging. There was a calcific stone at the pancreatic                        orifice.                       -The PD was cannulated but not injected. Given a PD                        stone was the likely reason for acute pancreatitis, a                        5fr-9 cm single pigtail stent was placed under                        endoscopic and fluoroscopic guidance to drain the PD and                        help resolve the acute pancreatitis.                       -The bile duct was cannulated with the PD stent in                        place. Contrast was injected. I acquired and interpreted                        the images. The bile duct was dilated down to the                        ampulla.                       -A biliary sphincterotomy was made. This released a                        calcified intra-ampullary stone that was blocking the       biliary orifice and likely causing the abnormal LFTs.                       -A 10Fr-7 cm straight stent was placed in the bile duct                        to promote biliary drainage.  Recommendation:      - [facility].                       - 3L LRs bolus.                       - NPO tonight.    < end of copied text >      Gi f/u appreciated  Recommendations  - continue to trend transaminases   - can advance diet as tolerated   - if any fevers or leukocytosis, start empiric antibiotics  - continue miralax if able to tolerate  - out of bed and ambulating as tolerating  - will need repeat ERCP in 3 months   - Please provide patient with Gastroenterology Clinic outpatient follow up number for an appointment [phone] ([facility])   - continue pain control and anti-emetics

## 2024-01-11 NOTE — PROGRESS NOTE ADULT - PROBLEM SELECTOR PROBLEM 6
Rx Refill Note  Requested Prescriptions     Pending Prescriptions Disp Refills   • clonazePAM (KlonoPIN) 0.5 MG tablet 30 tablet 0     Sig: Take 1 tablet by mouth At Night As Needed for Anxiety.      Last office visit with prescribing clinician: 9/17/2021   Last telemedicine visit with prescribing clinician: 5/23/2023   Next office visit with prescribing clinician: 5/23/2023                         Would you like a call back once the refill request has been completed: [] Yes [] No    If the office needs to give you a call back, can they leave a voicemail: [] Yes [] No    Raghav Grimaldo MA  04/20/23, 13:39 EDT   Anxiety

## 2024-01-11 NOTE — PROGRESS NOTE ADULT - ATTENDING COMMENTS
Agree with above  Biliary obstruction + Pancreatic obstruction secondary to stones s/p ERCP with sphincterotomy, stone removal and placement of PD and CBD stents.   Doing well.  Repeat ERCP as outpatient for stent removed.
Patient seen and examined with the GI fellow. I agree with the above assessment and plan. Thank you for allowing us to care for your patient.    Pt feeling well after procedure; pain resolved. Trend LFTs.
No significant clinical improvement. Reports ongoing abdominal pain and poor PO toleration. Would maintain on sips of clears as tolerating with maintenance IVF. Continue to trend liver tests. May need to consider repeat cross-sectional imaging next week if no improvement and/or MRCP if rising liver tests. Pending clinical course, may need to consider alternate means of nutrition if not tolerating PO. Additional recommendations as above.
No significant change in symptoms, possibly worse compared to yesterday. Not tolerating liquids, including apple juice due to pain. Labs pending from today.    # Recurrent acute interstitial pancreatitis: Previously with multiple potential etiologies, including EtOH, tobacco, MTX use, gallstones; however, patient now abstinent, no longer taking MTX and s/p cholecystectomy. IgG4, TG normal.   # DM  # RA    --Sips of water as tolerated, maintainable fluids while limited PO intake  --Pain management per primary team  --Recent CT findings with new PD dilation. Briefly reviewed case with advanced endoscopist, Dr. Asencio on 1/4. Would defer evaluation of potential ampullary stricture at this time in setting of acute inflammation. Can readdress pending clinical course and liver tests.   --Trend liver tests  --May need to consider repeat imaging next week if symptoms persist/not improving  --Suggest outpatient EUS once acute inflammatory process improves to rule out alternate etiology

## 2024-01-11 NOTE — PROGRESS NOTE ADULT - PROBLEM SELECTOR PLAN 11
MRN:[id number]                      After Visit Summary   4/13/2018    Trisha Bender    MRN: [id number]           Thank you!     Thank you for choosing [facility] for your care. Our goal is always to provide you with excellent care. Hearing back from our patients is one way we can continue to improve our services. Please take a few minutes to complete the written survey that you may receive in the mail after you visit with us. Thank you!        Patient Information     Date Of Birth          1957        About your hospital stay     You were admitted on:  April 13, 2018 You last received care in the:  [facility]    You were discharged on:  April 13, 2018       Who to Call     For medical emergencies, please call 911.  For non-urgent questions about your medical care, please call your primary care provider or clinic, [phone]  For questions related to your surgery, please call your surgery clinic        Attending Provider     Provider Specialty    Az Enamorado MD Anesthesiology       Primary Care Provider Office Phone # Fax #    Bandar Weinberg [phone]      Your next 10 appointments already scheduled     Apr 13, 2018   Procedure with Az Enamorado MD   [facility] ([facility])    [address] [place], [place] [id number] [phone]           Located in the Clinics and Surgery Center at [address] [place] [address].   parking is very convenient and highly recommended.  is a $6 flat rate fee.  Both  and self parkers should enter the main arrival plaza from [facility]; parking attendants will direct you based on your parking preference.            Apr 13, 2018  1:15 PM CDT   XR SURGERY LIZANRDO LESS THAN 5 MIN FLUORO W STILLS with [id number]   [facility] ([facility])    [address] [place] [address]  5th  [facility] [address]              Please bring a list of your current medicines to your exam. (Include vitamins, minerals and over-thecounter medicines.) Leave your valuables at home.  Tell your doctor if there is a chance you may be pregnant.  You do not need to do anything special for this exam.            Apr 16, 2018  9:00 AM CDT   (Arrive by 8:45 AM)   XR PELVIS AND HIP BILATERAL 2 VIEWS with UCXR1   [facility] ([facility])    [address] [place] [id number] [phone]           Please bring a list of your current medicines to your exam. (Include vitamins, minerals and over-thecounter medicines.) Leave your valuables at home.  Tell your doctor if there is a chance you may be pregnant.  You do not need to do anything special for this exam.            Apr 16, 2018  9:10 AM CDT   XR THORACIC SPINE 2 VIEWS with XR1   [facility] ([facility])    [address] [place] [id number] [phone]           Please bring a list of your current medicines to your exam. (Include vitamins, minerals and over-thecounter medicines.) Leave your valuables at home.  Tell your doctor if there is a chance you may be pregnant.  You do not need to do anything special for this exam.            Apr 16, 2018  9:20 AM CDT   (Arrive by 9:05 AM)   XR LUMBAR SPINE 2/3 VIEWS with [email or web address]   [facility] ([facility])    [address] [place] [address] [phone]           Please bring a list of your current medicines to your exam. (Include vitamins, minerals and over-thecounter medicines.) Leave your valuables at home.  Tell your doctor if there is a chance you may be pregnant.  You do not need to do anything special for this exam.            Apr 16, 2018  9:30 AM CDT   XR CHEST 1 VIEW with UCXR1   [facility]  Xray ([facility])    [address] [id number] [phone]           Please bring a list of your current medicines to your exam. (Include vitamins, minerals and over-thecounter medicines.) Leave your valuables at home.  Tell your doctor if there is a chance you may be pregnant.  You do not need to do anything special for this exam.            Apr 16, 2018  9:45 AM CDT   (Arrive by 9:30 AM)   XR CHEST 2 VIEWS with UCXR1   [facility] Xray ([facility])    [address] [address] [phone]           Please bring a list of your current medicines to your exam. (Include vitamins, minerals and over-thecounter medicines.) Leave your valuables at home.  Tell your doctor if there is a chance you may be pregnant.  You do not need to do anything special for this exam.            Apr 16, 2018 10:00 AM CDT   DX HIP/PELVIS/SPINE with UCDX1   [facility] ([facility])    [address] [address] [phone]           Please do not take any of the following 24 hours prior to the day of your exam: vitamins, calcium tablets, antacids.  If possible, please wear clothes without metal (snaps, zippers). A sweatsuit works well.            Apr 16, 2018  1:00 PM CDT   Ech Comp Bubble Study W/ Opti with [email or web address]   [facility] ([facility])    [address]  [place] [address] [phone]           1.  Please bring or wear a comfortable two-piece outfit. 2.  You may eat, drink and take your normal medicines. 3.  For any questions that cannot be answered, please contact the ordering physician            Apr 16, 2018  2:00 PM CDT   (Arrive by 1:45 PM)   NM LUNG SCAN PERFUSION PARTICULATE with UUNM2   [facility] ([facility])    [address] [facility] [address] [phone]           Please bring a list of your medicines to the exam. (Include vitamins, minerals and over-the-counter drugs.) You should wear comfortable clothes. Leave your valuables at home. Please bring related prior results and films.  Tell your doctor:   If you are breastfeeding or may be pregnant.   If you have had a barium test within the past few days. Barium may change the results of certain exams.   If you think you may need sedation (medicine to help you relax).  You may eat and drink as normal.  Please call your Imaging Department at your exam site with any questions.              Further instructions from your care team       Home Care Instructions after an Epidural Steroid Pain Injection    A lumbar epidural steroid injection delivers steroid medication directly into the area that may be causing your lower back pain and/or leg pain. A cervical or thoracic epidural steroid injection delivers steroids into the epidural space surrounding spinal nerve roots to help relieve pain in the upper spine/neck.    Activity  -Rest today  -Do not work today  -Resume normal activity tomorrow  -DO NOT shower for 24 hours  -DO NOT remove bandaid for 24 hours    Pain  -You may experience soreness at the injection site for one or two days  -You may use an ice pack for 20 minutes every 2 hours for the first 24 hours  -You may use a heating pad after the first 24 hours  -You may use Tylenol (acetaminophen) every 4 hours or other pain medicines as     directed by your physician    You may experience numbness radiating into your legs or arms (depending on the procedure location). This numbness may last several hours. Until sensation returns to normal; please use caution in walking, climbing stairs, and stepping out of your vehicle, etc.    DID YOU RECEIVE SEDATION TODAY?  No    Safety  Sedation medicine, if given, may remain active for  "many hours. It is important for the next 24 hours that you do not:  -Drive a car  -Operate machines or power tools  -Consume alcohol, including beer  -Sign any important papers or legal documents      Common side effects of steroids:  Not everyone will experience corticosteroid side effects. If side effects are experienced, they will gradually subside in the 7-10 day period following an injection. Most common side effects include:  -Flushed face and/or chest  -Feeling of warmth, particularly in the face but could be an overall feeling of warmth  -Increased blood sugar in diabetic patients  -Menstrual irregularities my occur. If taking hormone-based birth control an alternate method of birth control is recommended  -Sleep disturbances and/or mood swings are possible  -Leg cramps    Please contact us if you have:  -Severe pain  -Fever more than 101.5 degrees Fahrenheit  -Signs of infection at the injection site (redness, swelling, or drainage)    If you have questions, please contact our office at [phone] between the hours of 7:00 am and 3:00 pm Monday through Friday. After office hours you can contact the on call provider by dialing [phone]. If you need immediate attention, we recommend that you go to a hospital emergency room or dial [phone].      Pending Results     Date and Time Order Name Status Description    4/13/2018 1114 XR SURGERY LIZANDRO FLUORO LESS THAN 5 MIN W STILLS In process             Admission Information     Date & Time Provider Department Dept. Phone    4/13/2018 Az Enamorado MD [facility] [phone]      Your Vitals Were     Blood Pressure Pulse Temperature Respirations Height Weight    141/81 104 97  F (36.1  C) (Temporal) 16 1.778 m (5' 10\") 81.6 kg (180 lb)    Last Period Pulse Oximetry BMI (Body Mass Index)             09/29/2005 98% 25.83 kg/m2         [employer] Information     [facility] gives you secure access to your electronic health record. If you see a " primary care provider, you can also send messages to your care team and make appointments. If you have questions, please call your primary care clinic.  If you do not have a primary care provider, please call [phone] and they will assist you.      [email or web address] is an electronic gateway that provides easy, online access to your medical records. With [email or web address], you can request a clinic appointment, read your test results, renew a prescription or communicate with your care team.     To access your existing account, please contact your [facility] or call [phone] for assistance.        Care EveryWhere ID     This is your Care EveryWhere ID. This could be used by other organizations to access your [place] medical records  [id number]        Equal Access to Services     KAMILA ALLEN : Carlo Mclaughlin, tejal hernandez, dustin saba, jonathan burgess. So [facility] [phone].    ATENCIÓN: Si habla español, tiene a madison disposición servicios gratuitos de asistencia lingüística. Llame al [phone].    We comply with applicable federal civil rights laws and [place] laws. We do not discriminate on the basis of race, color, national origin, age, disability, sex, sexual orientation, or gender identity.               Review of your medicines      UNREVIEWED medicines. Ask your doctor about these medicines        Dose / Directions    ADVAIR DISKUS 500-50 MCG/DOSE diskus inhaler   Used for:  Pulmonary emphysema, unspecified emphysema type (H)   Generic drug:  fluticasone-salmeterol        INHALE 1 PUFF TWO TIMES DAILY   Quantity:  3 Inhaler   Refills:  3       * albuterol (2.5 MG/3ML) 0.083% neb solution   Used for:  SOB (shortness of breath)        Dose:  1 vial   Take 1 vial (2.5 mg) by nebulization every 6 hours as needed for shortness of breath / dyspnea or wheezing   Quantity:  25 vial   Refills:  0       * albuterol 108 (90 Base) MCG/ACT  Inhaler   Commonly known as:  VENTOLIN HFA   Used for:  Asthma exacerbation        Dose:  2 puff   Inhale 2 puffs into the lungs every 6 hours as needed for shortness of breath / dyspnea or wheezing   Quantity:  18 g   Refills:  3       ALPRAZolam 0.25 MG tablet   Commonly known as:  XANAX   Used for:  Anxiety        Take 1 tablet by mouth. Every twelve hours as needed for anxiety   Quantity:  30 tablet   Refills:  0       atorvastatin 10 MG tablet   Commonly known as:  LIPITOR   Used for:  Coronary artery disease involving native coronary artery of native heart without angina pectoris, Mixed hyperlipidemia        Takes 3 times a week   Quantity:  36 tablet   Refills:  3       baclofen 10 MG tablet   Commonly known as:  LIORESAL   Used for:  Chronic bilateral low back pain with right-sided sciatica        TAKE ONE TABLET BY MOUTH THREE TIMES A DAY AS NEEDED FOR MUSCLE SPASMS   Quantity:  90 tablet   Refills:  1       [employer] FIBER GUMMIES 2.5 g Chew        Dose:  1 tablet   Take 1 tablet by mouth daily Reported on 5/8/2017   Refills:  0       DULoxetine 20 MG EC capsule   Commonly known as:  CYMBALTA   Used for:  Severe episode of recurrent major depressive disorder, without psychotic features (H)        Dose:  20 mg   Take 1 capsule (20 mg) by mouth 2 times daily   Quantity:  60 capsule   Refills:  1       fluticasone 50 MCG/ACT spray   Commonly known as:  FLONASE   Used for:  Nasal congestion        SPRAY 1 TO 2 SPRAYS IN EACH NOSTRIL ONCE DAILY   Quantity:  16 g   Refills:  7       furosemide 20 MG tablet   Commonly known as:  LASIX   Used for:  Acute systolic congestive heart failure (H)        TAKE 2 TABLETS (40 MG) BY MOUTH DAILY SEVERE SWELLING   Quantity:  180 tablet   Refills:  1       HYDROcodone-acetaminophen 5-325 MG per tablet   Commonly known as:  NORCO   Used for:  Chronic bilateral low back pain without sciatica        Dose:  1-2 tablet   Take 1-2 tablets by mouth every 8 hours as needed for pain Maximum  6 daily   Quantity:  180 tablet   Refills:  0       ipratropium - albuterol 0.5 mg/2.5 mg/3 mL 0.5-2.5 (3) MG/3ML neb solution   Commonly known as:  DUONEB   Used for:  Moderate persistent asthma without complication        Dose:  1 vial   Take 1 vial (3 mLs) by nebulization every 6 hours as needed for shortness of breath / dyspnea or wheezing   Quantity:  30 vial   Refills:  1       lidocaine 5 % Patch   Commonly known as:  LIDODERM   Used for:  Mechanical low back pain        Apply up to 3 patches to painful area at once for up to 12 h within a 24 h period.  Remove after 12 hours.   Quantity:  30 patch   Refills:  0       lisinopril 2.5 MG tablet   Commonly known as:  PRINIVIL/Zestril   Used for:  Dilated cardiomyopathy (H)        Dose:  2.5 mg   Take 1 tablet (2.5 mg) by mouth daily   Quantity:  90 tablet   Refills:  3       mometasone 0.1 % ointment   Commonly known as:  ELOCON   Used for:  Intrinsic eczema        Apply sparingly to affected area twice daily as needed.  Do not apply to face.   Quantity:  45 g   Refills:  0       montelukast 10 MG tablet   Commonly known as:  SINGULAIR   Used for:  Mild persistent asthma without complication        Dose:  1 tablet   Take 1 tablet (10 mg) by mouth At Bedtime   Quantity:  90 tablet   Refills:  1       Multiple Vitamin Chew        Dose:  1 tablet   Take 1 tablet by mouth daily   Refills:  0       nitroGLYcerin 0.4 MG sublingual tablet   Commonly known as:  NITROSTAT   Used for:  Acute chest pain        Dose:  0.4 mg   Place 1 tablet (0.4 mg) under the tongue every 5 minutes as needed for chest pain If you are still having symptoms after 3 doses (15 minutes) call 911.   Quantity:  25 tablet   Refills:  0       ondansetron 4 MG ODT tab   Commonly known as:  ZOFRAN ODT   Used for:  Nausea        Dose:  4-8 mg   Take 1-2 tablets (4-8 mg) by mouth every 8 hours as needed for nausea   Quantity:  20 tablet   Refills:  3       pantoprazole 40 MG EC tablet   Commonly known  - DVT ppx: Lovenox SQ 40 mg daily   - Diet: NPO except for meds--> advance to CLD at lunch as:  PROTONIX   Used for:  Gastroesophageal reflux disease without esophagitis        TAKE ONE TABLET BY MOUTH EVERY DAY   Quantity:  90 tablet   Refills:  2       potassium chloride 10 MEQ tablet   Commonly known as:  K-TAB,KLOR-CON   Used for:  Hypokalemia        TAKE ONE TABLET BY MOUTH THREE TIMES A DAY   Quantity:  90 tablet   Refills:  5       predniSONE 5 MG tablet   Commonly known as:  DELTASONE   Used for:  Pulmonary emphysema, unspecified emphysema type (H)        Dose:  5 mg   Take 1 tablet (5 mg) by mouth daily   Quantity:  15 tablet   Refills:  0       roflumilast 500 MCG Tabs tablet   Commonly known as:  DALIRESP        Dose:  500 mcg   Take 1 tablet (500 mcg) by mouth daily   Refills:  0       SPIRIVA HANDIHALER 18 MCG capsule   Used for:  Moderate persistent asthma without complication   Generic drug:  tiotropium        USING THE HANDIHALER, INHALE THE CONTENTS OF ONE CAPSULE BY MOUTH DAILY   Quantity:  90 capsule   Refills:  1       SUMAtriptan 25 MG tablet   Commonly known as:  IMITREX   Used for:  Chronic migraine without aura without status migrainosus, not intractable        Dose:  25 mg   Take 1 tablet (25 mg) by mouth at onset of headache for migraine May repeat in 2 hours if needed: max 2/day;   Quantity:  9 tablet   Refills:  11       TYLENOL EXTRA STRENGTH PO   Used for:  Abdominal pain, right upper quadrant        1 TABLET EVERY 4 HOURS AS NEEDED   Refills:  0       * Notice:  This list has 2 medication(s) that are the same as other medications prescribed for you. Read the directions carefully, and ask your doctor or other care provider to review them with you.             Protect others around you: Learn how to safely use, store and throw away your medicines at www.disposemymeds.org.             Medication List: This is a list of all your medications and when to take them. Check marks below indicate your daily home schedule. Keep this list as a reference.      Medications            Morning Afternoon Evening Bedtime As Needed    ADVAIR DISKUS 500-50 MCG/DOSE diskus inhaler   INHALE 1 PUFF TWO TIMES DAILY   Generic drug:  fluticasone-salmeterol                                * albuterol (2.5 MG/3ML) 0.083% neb solution   Take 1 vial (2.5 mg) by nebulization every 6 hours as needed for shortness of breath / dyspnea or wheezing                                * albuterol 108 (90 Base) MCG/ACT Inhaler   Commonly known as:  VENTOLIN HFA   Inhale 2 puffs into the lungs every 6 hours as needed for shortness of breath / dyspnea or wheezing                                ALPRAZolam 0.25 MG tablet   Commonly known as:  XANAX   Take 1 tablet by mouth. Every twelve hours as needed for anxiety                                atorvastatin 10 MG tablet   Commonly known as:  LIPITOR   Takes 3 times a week                                baclofen 10 MG tablet   Commonly known as:  LIORESAL   TAKE ONE TABLET BY MOUTH THREE TIMES A DAY AS NEEDED FOR MUSCLE SPASMS                                [employer] FIBER GUMMIES 2.5 g Chew   Take 1 tablet by mouth daily Reported on 5/8/2017                                DULoxetine 20 MG EC capsule   Commonly known as:  CYMBALTA   Take 1 capsule (20 mg) by mouth 2 times daily                                fluticasone 50 MCG/ACT spray   Commonly known as:  FLONASE   SPRAY 1 TO 2 SPRAYS IN EACH NOSTRIL ONCE DAILY                                furosemide 20 MG tablet   Commonly known as:  LASIX   TAKE 2 TABLETS (40 MG) BY MOUTH DAILY SEVERE SWELLING                                HYDROcodone-acetaminophen 5-325 MG per tablet   Commonly known as:  NORCO   Take 1-2 tablets by mouth every 8 hours as needed for pain Maximum 6 daily                                ipratropium - albuterol 0.5 mg/2.5 mg/3 mL 0.5-2.5 (3) MG/3ML neb solution   Commonly known as:  DUONEB   Take 1 vial (3 mLs) by nebulization every 6 hours as needed for shortness of breath / dyspnea or wheezing                                 lidocaine 5 % Patch   Commonly known as:  LIDODERM   Apply up to 3 patches to painful area at once for up to 12 h within a 24 h period.  Remove after 12 hours.                                lisinopril 2.5 MG tablet   Commonly known as:  PRINIVIL/Zestril   Take 1 tablet (2.5 mg) by mouth daily                                mometasone 0.1 % ointment   Commonly known as:  ELOCON   Apply sparingly to affected area twice daily as needed.  Do not apply to face.                                montelukast 10 MG tablet   Commonly known as:  SINGULAIR   Take 1 tablet (10 mg) by mouth At Bedtime                                Multiple Vitamin Chew   Take 1 tablet by mouth daily                                nitroGLYcerin 0.4 MG sublingual tablet   Commonly known as:  NITROSTAT   Place 1 tablet (0.4 mg) under the tongue every 5 minutes as needed for chest pain If you are still having symptoms after 3 doses (15 minutes) call 911.                                ondansetron 4 MG ODT tab   Commonly known as:  ZOFRAN ODT   Take 1-2 tablets (4-8 mg) by mouth every 8 hours as needed for nausea                                pantoprazole 40 MG EC tablet   Commonly known as:  PROTONIX   TAKE ONE TABLET BY MOUTH EVERY DAY                                potassium chloride 10 MEQ tablet   Commonly known as:  K-TAB,KLOR-CON   TAKE ONE TABLET BY MOUTH THREE TIMES A DAY                                predniSONE 5 MG tablet   Commonly known as:  DELTASONE   Take 1 tablet (5 mg) by mouth daily                                roflumilast 500 MCG Tabs tablet   Commonly known as:  DALIRESP   Take 1 tablet (500 mcg) by mouth daily                                SPIRIVA HANDIHALER 18 MCG capsule   USING THE HANDIHALER, INHALE THE CONTENTS OF ONE CAPSULE BY MOUTH DAILY   Generic drug:  tiotropium                                SUMAtriptan 25 MG tablet   Commonly known as:  IMITREX   Take 1 tablet (25 mg) by mouth at onset of headache  for migraine May repeat in 2 hours if needed: max 2/day;                                TYLENOL EXTRA STRENGTH PO   1 TABLET EVERY 4 HOURS AS NEEDED                                * Notice:  This list has 2 medication(s) that are the same as other medications prescribed for you. Read the directions carefully, and ask your doctor or other care provider to review them with you.       - DVT ppx: Lovenox SQ 40 mg daily   - Diet: NPO except for meds--> advance to [facility]D at lunch    Discharge planning  Plan to advance to regular diet in AM  Out patient f/u GI and PCP  GI Greatly appreciated  F/u MRI results

## 2024-01-11 NOTE — PROGRESS NOTE ADULT - PROBLEM SELECTOR PLAN 1
Pt presenting with worsening epigastric pain x2 weeks, associated with nausea and poor PO intake. Lipase elevated to 1282. Most likely acute pancreatitis, though unclear etiology. Pt denies any current alcohol use. TG 76 on admission. IgG4 in Dec 2023 wnl. Possibly from nicotine vaping? unclear why Pt regressed/relapsed. Previous CT scan reviewed,  repeat CT does note mildly worsening pancreatitis with narrowing of ampulla, unclear from inflammation vs stricture   - S/p 2L bolus of NS in ED.   - c/w NPO and sips of water and IVF  -repeat CT does note mildly worsening pancreatitis with narrowing of ampulla, unclear from inflammation vs stricture   -change pain regimen to dilaudid 1mg q4h ---> q3 --->On  1/9 inc to 1.5 mg q3 prn  -encourage bowel regimen  -pepcid BID IV BID  - Serial abdominal exams  -appreciate GI input---> wants MRCP---> rd and pending  repeat LFT rising---> hepatology consult requested. Pt presenting with worsening epigastric pain x2 weeks, associated with nausea and poor PO intake. Lipase elevated to 1282. Most likely acute pancreatitis, though unclear etiology. Pt denies any current alcohol use. TG 76 on admission. IgG4 in Dec 2023 wnl. Possibly from nicotine vaping? unclear why Pt regressed/relapsed. Previous CT scan reviewed,  repeat CT does note mildly worsening pancreatitis with narrowing of ampulla, unclear from inflammation vs stricture   - S/p 2L bolus of NS in ED.   - c/w NPO and sips of water and IVF  -repeat CT does note mildly worsening pancreatitis with narrowing of ampulla, unclear from inflammation vs stricture   -change pain regimen to dilaudid 1mg q4h ---> q3 --->On  1/9 inc to 1.5 mg q3 prn  -encourage bowel regimen  repeat LFT rising---> hepatology consult requested.  1/9 EUS/ ERCP with stent to pancreatic duct done and removal of pancreatic stone, Sphincterotomy to biliaryducr with removal of stone  1/10 Tbili 6---> 1/11 Imroved LFT with T bili 2.7

## 2024-01-11 NOTE — PROGRESS NOTE ADULT - NUTRITIONAL ASSESSMENT
This patient has been assessed with a concern for Malnutrition and has been determined to have a diagnosis/diagnoses of Severe protein-calorie malnutrition.    This patient is being managed with:   Diet Full Liquid-  Entered: Jan 11 2024  8:38AM

## 2024-01-11 NOTE — PROGRESS NOTE ADULT - ASSESSMENT
49 yo man with history of recurrent pancreatitis, ?asthma/COPD (not on home O2), rheumatoid arthritis (not on any meds),  type 2 DM (not on insulin), anxiety, and alcohol use disorder (sober for last ~20 years) presents with worsening epigastric pain that started shortly after pt was discharged from [facility] on 12/19/23, admitted with acute pancreatitis. Pain syndrome and elevated lipase is consistent.     CT Abdomen and Pelvis w/ IV Cont (01.04.24 @ 15:45) >  IMPRESSION:  Mild interval worsening of acute pancreatitis with new pancreatic ductal   dilatation to level of theampulla.  Ductal dilatation could be due to an ampullary stricture or severe   inflammation of the periampullary duodenal wall.       47 yo man with history of recurrent pancreatitis, ?asthma/COPD (not on home O2), rheumatoid arthritis (not on any meds),  type 2 DM (not on insulin), anxiety, and alcohol use disorder (sober for last ~20 years) presents with worsening epigastric pain that started shortly after pt was discharged from [facility] on 12/19/23, admitted with acute pancreatitis. Pain syndrome and elevated lipase is consistent.     CT Abdomen and Pelvis w/ IV Cont (01.04.24 @ 15:45) >  IMPRESSION:  Mild interval worsening of acute pancreatitis with new pancreatic ductal   dilatation to level of theampulla.  Ductal dilatation could be due to an ampullary stricture or severe   inflammation of the periampullary duodenal wall.       47 yo man with history of recurrent pancreatitis, ?asthma/COPD (not on home O2), rheumatoid arthritis (not on any meds),  type 2 DM (not on insulin), anxiety, and alcohol use disorder (sober for last ~20 years) presents with worsening epigastric pain that started shortly after pt was discharged from [facility] on 12/19/23, admitted with acute pancreatitis. Pain syndrome and elevated lipase is consistent.     CT Abdomen and Pelvis w/ IV Cont (01.04.24 @ 15:45) >  IMPRESSION:  Mild interval worsening of acute pancreatitis with new pancreatic ductal   dilatation to level of theampulla.  Ductal dilatation could be due to an ampullary stricture or severe   inflammation of the periampullary duodenal wall.    1/11 Acute pancreatitis present on admision secondary to stone in Pancreatic Duct now resolved with EUS and ERCP 1/9 and stent to PD and Sphincterotomy of biliary duct.       49 yo man with history of recurrent pancreatitis, ?asthma/COPD (not on home O2), rheumatoid arthritis (not on any meds),  type 2 DM (not on insulin), anxiety, and alcohol use disorder (sober for last ~20 years) presents with worsening epigastric pain that started shortly after pt was discharged from [facility] on 12/19/23, admitted with acute pancreatitis. Pain syndrome and elevated lipase is consistent.     CT Abdomen and Pelvis w/ IV Cont (01.04.24 @ 15:45) >  IMPRESSION:  Mild interval worsening of acute pancreatitis with new pancreatic ductal   dilatation to level of theampulla.  Ductal dilatation could be due to an ampullary stricture or severe   inflammation of the periampullary duodenal wall.    1/11 Acute pancreatitis present on admision secondary to stone in Pancreatic Duct now resolved with EUS and ERCP 1/9 and stent to PD and Sphincterotomy of biliary duct.

## 2024-01-11 NOTE — PROGRESS NOTE ADULT - PROBLEM SELECTOR PLAN 4
Pt with marked sinus dorian to 47 bpm on admission EKG. Pt HD stable and asymptomatic from bradycardia. Likely near pt's baseline resting HR, as pt noted to have HR in low 60s at [place].   - No AV blocks noted on admission EKG  - WNL  - Monitor VS q4hrs    1/9 HR 71, Observe Pt with marked sinus dorian to 47 bpm on admission EKG. Pt HD stable and asymptomatic from bradycardia. Likely near pt's baseline resting HR, as pt noted to have HR in low 60s at [place].   - No AV blocks noted on admission EKG  - WNL  - Monitor VS q4hrs    1/9 HR 71, Observe

## 2024-01-11 NOTE — PROGRESS NOTE ADULT - PROBLEM SELECTOR PLAN 7
now on clear liquid diet. plan to advance to full liquid diet with ensure in AM since pain now improving after EUS 1/9/24. now on clear liquid diet. plan to advance to full liquid diet with ensure in AM since pain now improving after EUS and ERCP 1/9/24.  1/11 Full liquid diet

## 2024-01-12 ENCOUNTER — TRANSCRIPTION ENCOUNTER (OUTPATIENT)
Age: 49
End: 2024-01-12

## 2024-01-12 VITALS
RESPIRATION RATE: 18 BRPM | DIASTOLIC BLOOD PRESSURE: 83 MMHG | TEMPERATURE: 98 F | SYSTOLIC BLOOD PRESSURE: 129 MMHG | HEART RATE: 63 BPM | OXYGEN SATURATION: 97 %

## 2024-01-12 LAB
ALBUMIN SERPL ELPH-MCNC: 3.5 G/DL — SIGNIFICANT CHANGE UP (ref 3.3–5)
ALBUMIN SERPL ELPH-MCNC: 3.5 G/DL — SIGNIFICANT CHANGE UP (ref 3.3–5)
ALP SERPL-CCNC: 250 U/L — HIGH (ref 40–120)
ALP SERPL-CCNC: 250 U/L — HIGH (ref 40–120)
ALT FLD-CCNC: 470 U/L — HIGH (ref 4–41)
ALT FLD-CCNC: 470 U/L — HIGH (ref 4–41)
ANION GAP SERPL CALC-SCNC: 11 MMOL/L — SIGNIFICANT CHANGE UP (ref 7–14)
ANION GAP SERPL CALC-SCNC: 11 MMOL/L — SIGNIFICANT CHANGE UP (ref 7–14)
AST SERPL-CCNC: 133 U/L — HIGH (ref 4–40)
AST SERPL-CCNC: 133 U/L — HIGH (ref 4–40)
BILIRUB SERPL-MCNC: 2.1 MG/DL — HIGH (ref 0.2–1.2)
BILIRUB SERPL-MCNC: 2.1 MG/DL — HIGH (ref 0.2–1.2)
BUN SERPL-MCNC: 8 MG/DL — SIGNIFICANT CHANGE UP (ref 7–23)
BUN SERPL-MCNC: 8 MG/DL — SIGNIFICANT CHANGE UP (ref 7–23)
CALCIUM SERPL-MCNC: 8.8 MG/DL — SIGNIFICANT CHANGE UP (ref 8.4–10.5)
CALCIUM SERPL-MCNC: 8.8 MG/DL — SIGNIFICANT CHANGE UP (ref 8.4–10.5)
CHLORIDE SERPL-SCNC: 106 MMOL/L — SIGNIFICANT CHANGE UP (ref 98–107)
CHLORIDE SERPL-SCNC: 106 MMOL/L — SIGNIFICANT CHANGE UP (ref 98–107)
CO2 SERPL-SCNC: 25 MMOL/L — SIGNIFICANT CHANGE UP (ref 22–31)
CO2 SERPL-SCNC: 25 MMOL/L — SIGNIFICANT CHANGE UP (ref 22–31)
CREAT SERPL-MCNC: 0.69 MG/DL — SIGNIFICANT CHANGE UP (ref 0.5–1.3)
CREAT SERPL-MCNC: 0.69 MG/DL — SIGNIFICANT CHANGE UP (ref 0.5–1.3)
EGFR: 113 ML/MIN/1.73M2 — SIGNIFICANT CHANGE UP
EGFR: 113 ML/MIN/1.73M2 — SIGNIFICANT CHANGE UP
GLUCOSE SERPL-MCNC: 120 MG/DL — HIGH (ref 70–99)
GLUCOSE SERPL-MCNC: 120 MG/DL — HIGH (ref 70–99)
HCT VFR BLD CALC: 45 % — SIGNIFICANT CHANGE UP (ref 39–50)
HCT VFR BLD CALC: 45 % — SIGNIFICANT CHANGE UP (ref 39–50)
HGB BLD-MCNC: 14.9 G/DL — SIGNIFICANT CHANGE UP (ref 13–17)
HGB BLD-MCNC: 14.9 G/DL — SIGNIFICANT CHANGE UP (ref 13–17)
MCHC RBC-ENTMCNC: 27 PG — SIGNIFICANT CHANGE UP (ref 27–34)
MCHC RBC-ENTMCNC: 27 PG — SIGNIFICANT CHANGE UP (ref 27–34)
MCHC RBC-ENTMCNC: 33.1 GM/DL — SIGNIFICANT CHANGE UP (ref 32–36)
MCHC RBC-ENTMCNC: 33.1 GM/DL — SIGNIFICANT CHANGE UP (ref 32–36)
MCV RBC AUTO: 81.5 FL — SIGNIFICANT CHANGE UP (ref 80–100)
MCV RBC AUTO: 81.5 FL — SIGNIFICANT CHANGE UP (ref 80–100)
NRBC # BLD: 0 /100 WBCS — SIGNIFICANT CHANGE UP (ref 0–0)
NRBC # BLD: 0 /100 WBCS — SIGNIFICANT CHANGE UP (ref 0–0)
NRBC # FLD: 0 K/UL — SIGNIFICANT CHANGE UP (ref 0–0)
NRBC # FLD: 0 K/UL — SIGNIFICANT CHANGE UP (ref 0–0)
PLATELET # BLD AUTO: 192 K/UL — SIGNIFICANT CHANGE UP (ref 150–400)
PLATELET # BLD AUTO: 192 K/UL — SIGNIFICANT CHANGE UP (ref 150–400)
POTASSIUM SERPL-MCNC: 3.3 MMOL/L — LOW (ref 3.5–5.3)
POTASSIUM SERPL-MCNC: 3.3 MMOL/L — LOW (ref 3.5–5.3)
POTASSIUM SERPL-SCNC: 3.3 MMOL/L — LOW (ref 3.5–5.3)
POTASSIUM SERPL-SCNC: 3.3 MMOL/L — LOW (ref 3.5–5.3)
PROT SERPL-MCNC: 6.6 G/DL — SIGNIFICANT CHANGE UP (ref 6–8.3)
PROT SERPL-MCNC: 6.6 G/DL — SIGNIFICANT CHANGE UP (ref 6–8.3)
RBC # BLD: 5.52 M/UL — SIGNIFICANT CHANGE UP (ref 4.2–5.8)
RBC # BLD: 5.52 M/UL — SIGNIFICANT CHANGE UP (ref 4.2–5.8)
RBC # FLD: 13.7 % — SIGNIFICANT CHANGE UP (ref 10.3–14.5)
RBC # FLD: 13.7 % — SIGNIFICANT CHANGE UP (ref 10.3–14.5)
SODIUM SERPL-SCNC: 142 MMOL/L — SIGNIFICANT CHANGE UP (ref 135–145)
SODIUM SERPL-SCNC: 142 MMOL/L — SIGNIFICANT CHANGE UP (ref 135–145)
WBC # BLD: 9.78 K/UL — SIGNIFICANT CHANGE UP (ref 3.8–10.5)
WBC # BLD: 9.78 K/UL — SIGNIFICANT CHANGE UP (ref 3.8–10.5)
WBC # FLD AUTO: 9.78 K/UL — SIGNIFICANT CHANGE UP (ref 3.8–10.5)
WBC # FLD AUTO: 9.78 K/UL — SIGNIFICANT CHANGE UP (ref 3.8–10.5)

## 2024-01-12 PROCEDURE — 99239 HOSP IP/OBS DSCHRG MGMT >30: CPT

## 2024-01-12 RX ORDER — METFORMIN HYDROCHLORIDE 850 MG/1
1 TABLET ORAL
Refills: 0 | DISCHARGE

## 2024-01-12 RX ORDER — POLYETHYLENE GLYCOL 3350 17 G/17G
17 POWDER, FOR SOLUTION ORAL
Qty: 0 | Refills: 0 | DISCHARGE
Start: 2024-01-12

## 2024-01-12 RX ORDER — REPAGLINIDE 1 MG/1
0.5 TABLET ORAL
Refills: 0 | Status: DISCONTINUED | OUTPATIENT
Start: 2024-01-12 | End: 2024-01-12

## 2024-01-12 RX ORDER — IBUPROFEN 200 MG
1 TABLET ORAL
Qty: 0 | Refills: 0 | DISCHARGE
Start: 2024-01-12

## 2024-01-12 RX ORDER — PANTOPRAZOLE SODIUM 20 MG/1
1 TABLET, DELAYED RELEASE ORAL
Qty: 60 | Refills: 0
Start: 2024-01-12 | End: 2024-02-10

## 2024-01-12 RX ORDER — POTASSIUM CHLORIDE 20 MEQ
40 PACKET (EA) ORAL ONCE
Refills: 0 | Status: DISCONTINUED | OUTPATIENT
Start: 2024-01-12 | End: 2024-01-12

## 2024-01-12 RX ORDER — SENNA PLUS 8.6 MG/1
2 TABLET ORAL
Qty: 0 | Refills: 0 | DISCHARGE
Start: 2024-01-12

## 2024-01-12 RX ORDER — REPAGLINIDE 1 MG/1
1 TABLET ORAL
Qty: 60 | Refills: 0
Start: 2024-01-12 | End: 2024-02-10

## 2024-01-12 RX ORDER — SIMETHICONE 80 MG/1
1 TABLET, CHEWABLE ORAL
Qty: 30 | Refills: 0
Start: 2024-01-12 | End: 2024-02-10

## 2024-01-12 RX ADMIN — FAMOTIDINE 20 MILLIGRAM(S): 10 INJECTION INTRAVENOUS at 05:59

## 2024-01-12 RX ADMIN — CHLORHEXIDINE GLUCONATE 1 APPLICATION(S): 213 SOLUTION TOPICAL at 05:59

## 2024-01-12 NOTE — PROGRESS NOTE ADULT - PROBLEM SELECTOR PLAN 7
now on clear liquid diet. plan to advance to full liquid diet with ensure in AM since pain now improving after EUS and ERCP 1/9/24.  1/11 Full liquid diet Pt denies any history of COPD or asthma, although documented in pt's EMR that he has COPD and asthma. Pt not on any inhalers at home.  - Monitor respiratory status  - Supplemental O2 PRN

## 2024-01-12 NOTE — PROGRESS NOTE ADULT - NUTRITIONAL ASSESSMENT
This patient has been assessed with a concern for Malnutrition and has been determined to have a diagnosis/diagnoses of Severe protein-calorie malnutrition.    This patient is being managed with:   Diet Regular-  Entered: Jan 11 2024 12:34PM

## 2024-01-12 NOTE — DISCHARGE NOTE NURSING/CASE MANAGEMENT/SOCIAL WORK - NSDCPEFALRISK_GEN_ALL_CORE
For information on Fall & Injury Prevention, visit: [email or web address] OR  [email or web address] OR  https://www.cdc.gov/steadi/patient.html For information on Fall & Injury Prevention, visit: [email or web address] OR  [email or web address] OR  https://www.cdc.gov/steadi/patient.html

## 2024-01-12 NOTE — PROGRESS NOTE ADULT - PROBLEM/PLAN-9
Patient calling today at 2:57p to schedule a appointment, a referral is in system from Dr. Bennie Hunter. Please call and advise.  
DISPLAY PLAN FREE TEXT

## 2024-01-12 NOTE — PROGRESS NOTE ADULT - PROBLEM SELECTOR PLAN 6
- C/w Xanax 1 mg qHS PRN for anxiety/sleep  - ISTOP in chart Pt not on any meds at this time. Stable, no S/S of flare.  - Pain control with Tylenol PRN

## 2024-01-12 NOTE — PROGRESS NOTE ADULT - PROBLEM SELECTOR PLAN 3
WBC 12.91 on admission. Pt did not meet SIRS criteria, however. Leukocytosis likely in setting of acute pancreatitis. Pt afebrile.  -resolved   - Monitor off antibiotics for now  1/9/23 WBC 7.5 RESOLVED LEUKOCYTOSIS Pt on Metformin  mg daily at home.  - Hold Metformin while inpatient  - Start low-dose ISS and FS checks q6hrs while NPO  - a1c 5.7, will do FS for now but if bothersome can dc Pt on Metformin  mg daily at home.  - Hold Metformin while inpatient  - Start low-dose ISS and FS checks q6hrs while NPO  - a1c 5.7, will do FS for now but if bothersome can dc  Can start w dose prandin 0.5 mg q12 with meals.  no metformin with levated LFT

## 2024-01-12 NOTE — PROGRESS NOTE ADULT - PROBLEM SELECTOR PROBLEM 2
Elevated LFTs
Leukocytosis
Elevated LFTs
Leukocytosis
Leukocytosis

## 2024-01-12 NOTE — PROGRESS NOTE ADULT - REASON FOR ADMISSION
Acute pancreatitis

## 2024-01-12 NOTE — PROGRESS NOTE ADULT - ASSESSMENT
47 yo man with history of recurrent pancreatitis, ?asthma/COPD (not on home O2), rheumatoid arthritis (not on any meds),  type 2 DM (not on insulin), anxiety, and alcohol use disorder (sober for last ~20 years) presents with worsening epigastric pain that started shortly after pt was discharged from [facility] on 12/19/23, admitted with acute pancreatitis. Pain syndrome and elevated lipase is consistent.     CT Abdomen and Pelvis w/ IV Cont (01.04.24 @ 15:45) >  IMPRESSION:  Mild interval worsening of acute pancreatitis with new pancreatic ductal   dilatation to level of theampulla.  Ductal dilatation could be due to an ampullary stricture or severe   inflammation of the periampullary duodenal wall.    1/11 Acute pancreatitis present on admision secondary to stone in Pancreatic Duct now resolved with EUS and ERCP 1/9 and stent to PD and Sphincterotomy of biliary duct.    rd< from: MR MRCP No Cont (01.10.24 @ 18:50) >  IMPRESSION:  Interval improvement in acute pancreatitis with normal caliber pancreatic   duct and biliary tree.    < end of copied text >       47 yo man with history of recurrent pancreatitis, ?asthma/COPD (not on home O2), rheumatoid arthritis (not on any meds),  type 2 DM (not on insulin), anxiety, and alcohol use disorder (sober for last ~20 years) presents with worsening epigastric pain that started shortly after pt was discharged from [facility] on 12/19/23, admitted with acute pancreatitis. Pain syndrome and elevated lipase is consistent.     CT Abdomen and Pelvis w/ IV Cont (01.04.24 @ 15:45) >  IMPRESSION:  Mild interval worsening of acute pancreatitis with new pancreatic ductal   dilatation to level of theampulla.  Ductal dilatation could be due to an ampullary stricture or severe   inflammation of the periampullary duodenal wall.    1/11 Acute pancreatitis present on admision secondary to stone in Pancreatic Duct now resolved with EUS and ERCP 1/9 and stent to PD and Sphincterotomy of biliary duct.    rd< from: MR MRCP No Cont (01.10.24 @ 18:50) >  IMPRESSION:  Interval improvement in acute pancreatitis with normal caliber pancreatic   duct and biliary tree.    < end of copied text >       47 yo man with history of recurrent pancreatitis, ?asthma/COPD (not on home O2), rheumatoid arthritis (not on any meds),  type 2 DM (not on insulin), anxiety, and alcohol use disorder (sober for last ~20 years) presents with worsening epigastric pain that started shortly after pt was discharged from [facility] on 12/19/23, admitted with acute pancreatitis. Pain syndrome and elevated lipase is consistent.     CT Abdomen and Pelvis w/ IV Cont (01.04.24 @ 15:45) >  IMPRESSION:  Mild interval worsening of acute pancreatitis with new pancreatic ductal   dilatation to level of theampulla.  Ductal dilatation could be due to an ampullary stricture or severe   inflammation of the periampullary duodenal wall.    1/11 Acute pancreatitis present on admision secondary to stone in Pancreatic Duct now resolved with EUS and ERCP 1/9 and stent to PD and Sphincterotomy of biliary duct.    rd< from: MR MRCP No Cont (01.10.24 @ 18:50) >  IMPRESSION:  Interval improvement in acute pancreatitis with normal caliber pancreatic   duct and biliary tree.           49 yo man with history of recurrent pancreatitis, ?asthma/COPD (not on home O2), rheumatoid arthritis (not on any meds),  type 2 DM (not on insulin), anxiety, and alcohol use disorder (sober for last ~20 years) presents with worsening epigastric pain that started shortly after pt was discharged from [facility] on 12/19/23, admitted with acute pancreatitis. Pain syndrome and elevated lipase is consistent.     CT Abdomen and Pelvis w/ IV Cont (01.04.24 @ 15:45) >  IMPRESSION:  Mild interval worsening of acute pancreatitis with new pancreatic ductal   dilatation to level of theampulla.  Ductal dilatation could be due to an ampullary stricture or severe   inflammation of the periampullary duodenal wall.    1/11 Acute pancreatitis present on admision secondary to stone in Pancreatic Duct now resolved with EUS and ERCP 1/9 and stent to PD and Sphincterotomy of biliary duct.    rd< from: MR MRCP No Cont (01.10.24 @ 18:50) >  IMPRESSION:  Interval improvement in acute pancreatitis with normal caliber pancreatic   duct and biliary tree.

## 2024-01-12 NOTE — DISCHARGE NOTE NURSING/CASE MANAGEMENT/SOCIAL WORK - PATIENT PORTAL LINK FT
You can access the FollowMyHealth Patient Portal offered by [facility] by registering at the following website: [email or web address]. By joining [employer]’s FollowMyHealth portal, you will also be able to view your health information using other applications (apps) compatible with our system. You can access the FollowMyHealth Patient Portal offered by [facility] by registering at the following website: [email or web address]. By joining [employer]’s FollowMyHealth portal, you will also be able to view your health information using other applications (apps) compatible with our system.

## 2024-01-12 NOTE — PROGRESS NOTE ADULT - PROBLEM SELECTOR PLAN 8
Pt not on any meds at this time. Stable, no S/S of flare.  - Pain control with Tylenol PRN Pt formerly a heavy drinker, has been sober for ~20 years. No S/S of withdrawal on exam,  No tongue fasciculations or tremors on exam.   - Monitor for possible withdrawal S/S off CIWA for now

## 2024-01-12 NOTE — PROGRESS NOTE ADULT - SUBJECTIVE AND OBJECTIVE BOX
Patient is a 48y old  Male who presents with a chief complaint of Acute pancreatitis (11 Jan 2024 09:07)      SUBJECTIVE / OVERNIGHT EVENTS:      MEDICATIONS  (STANDING):  chlorhexidine 2% Cloths 1 Application(s) Topical <User Schedule>  dextrose 5% + sodium chloride 0.9%. 1000 milliLiter(s) (75 mL/Hr) IV Continuous <Continuous>  dextrose 5%. 1000 milliLiter(s) (50 mL/Hr) IV Continuous <Continuous>  dextrose 5%. 1000 milliLiter(s) (100 mL/Hr) IV Continuous <Continuous>  dextrose 50% Injectable 25 Gram(s) IV Push once  dextrose 50% Injectable 25 Gram(s) IV Push once  dextrose 50% Injectable 12.5 Gram(s) IV Push once  enoxaparin Injectable 40 milliGRAM(s) SubCutaneous every 24 hours  famotidine Injectable 20 milliGRAM(s) IV Push every 12 hours  glucagon  Injectable 1 milliGRAM(s) IntraMuscular once  insulin lispro (ADMELOG) corrective regimen sliding scale   SubCutaneous every 6 hours  mupirocin 2% Ointment 1 Application(s) Topical every 12 hours  pantoprazole  Injectable 40 milliGRAM(s) IV Push once  polyethylene glycol 3350 17 Gram(s) Oral daily  potassium chloride   Powder 40 milliEquivalent(s) Oral once    MEDICATIONS  (PRN):  aluminum hydroxide/magnesium hydroxide/simethicone Suspension 30 milliLiter(s) Oral every 6 hours PRN Dyspepsia  dextrose Oral Gel 15 Gram(s) Oral once PRN Blood Glucose LESS THAN 70 milliGRAM(s)/deciliter  ibuprofen  Tablet. 400 milliGRAM(s) Oral every 6 hours PRN Mild Pain (1 - 3), Moderate Pain (4 - 6), Severe Pain (7 - 10)  ondansetron Injectable 4 milliGRAM(s) IV Push every 8 hours PRN Nausea and/or Vomiting  saline laxative (FLEET) Rectal Enema 1 Enema Rectal daily PRN constipation  senna 2 Tablet(s) Oral at bedtime PRN Constipation  simethicone 80 milliGRAM(s) Chew daily PRN Gas          I&O's Summary    11 Jan 2024 07:01  -  12 Jan 2024 07:00  --------------------------------------------------------  IN: 1650 mL / OUT: 0 mL / NET: 1650 mL      Vital Signs Last 24 Hrs    T(F): 98.1 (12 Jan 2024 06:09), Max: 98.2 (11 Jan 2024 20:56)  HR: 62 (12 Jan 2024 06:09) (62 - 67)  BP: 132/83 (12 Jan 2024 06:09) (132/83 - 146/77)  RR: 17 (12 Jan 2024 06:09) (17 - 18)  SpO2: 97% (12 Jan 2024 06:09) (97% - 98%)  room air      PHYSICAL EXAM:  GENERAL: NAD,  HEAD:  Atraumatic, Normocephalic  EYES: EOMI, PERRLA, conjunctiva and sclera clear  NECK: Supple, No JVD  CHEST/LUNG: Clear to auscultation bilaterally; No wheeze  HEART: Regular rate and rhythm; No murmurs, rubs, or gallops  ABDOMEN: Soft, Nontender, Nondistended; Bowel sounds present  EXTREMITIES:  2+ Peripheral Pulses, No clubbing, cyanosis, or edema  PSYCH: AAOx3  NEUROLOGY: non-focal  SKIN: No rashes or lesions    LABS:                        14.9   9.78  )-----------( 192      ( 12 Jan 2024 06:31 )             45.0     01-12    142  |  106  |  8   ----------------------------<  120<H>  3.3<L>   |  25  |  0.69    Ca    8.8      12 Jan 2024 06:31  Phos  3.0     01-11  Mg     1.90     01-11    TPro  6.6  /  Alb  3.5  /  TBili  2.1<H>  /  DBili  x   /  AST  133<H>  /  ALT  470<H>  /  AlkPhos  250<H>  01-12          Care Discussed with Consultants/Other Providers:   Patient is a 48y old  Male who presents with a chief complaint of Acute pancreatitis (11 Jan 2024 09:07)      SUBJECTIVE / OVERNIGHT EVENTS:  Tolerated regular diet since 1/11/24  Ready for home  Reports of LFT and Mri given to patient for out patient f/u with MD at [facility].      MEDICATIONS  (STANDING):  chlorhexidine 2% Cloths 1 Application(s) Topical <User Schedule>  dextrose 5% + sodium chloride 0.9%. 1000 milliLiter(s) (75 mL/Hr) IV Continuous <Continuous>  dextrose 5%. 1000 milliLiter(s) (50 mL/Hr) IV Continuous <Continuous>  dextrose 5%. 1000 milliLiter(s) (100 mL/Hr) IV Continuous <Continuous>  dextrose 50% Injectable 25 Gram(s) IV Push once  dextrose 50% Injectable 25 Gram(s) IV Push once  dextrose 50% Injectable 12.5 Gram(s) IV Push once  enoxaparin Injectable 40 milliGRAM(s) SubCutaneous every 24 hours  famotidine Injectable 20 milliGRAM(s) IV Push every 12 hours  glucagon  Injectable 1 milliGRAM(s) IntraMuscular once  insulin lispro (ADMELOG) corrective regimen sliding scale   SubCutaneous every 6 hours  mupirocin 2% Ointment 1 Application(s) Topical every 12 hours  pantoprazole  Injectable 40 milliGRAM(s) IV Push once  polyethylene glycol 3350 17 Gram(s) Oral daily  potassium chloride   Powder 40 milliEquivalent(s) Oral once    MEDICATIONS  (PRN):  aluminum hydroxide/magnesium hydroxide/simethicone Suspension 30 milliLiter(s) Oral every 6 hours PRN Dyspepsia  dextrose Oral Gel 15 Gram(s) Oral once PRN Blood Glucose LESS THAN 70 milliGRAM(s)/deciliter  ibuprofen  Tablet. 400 milliGRAM(s) Oral every 6 hours PRN Mild Pain (1 - 3), Moderate Pain (4 - 6), Severe Pain (7 - 10)  ondansetron Injectable 4 milliGRAM(s) IV Push every 8 hours PRN Nausea and/or Vomiting  saline laxative (FLEET) Rectal Enema 1 Enema Rectal daily PRN constipation  senna 2 Tablet(s) Oral at bedtime PRN Constipation  simethicone 80 milliGRAM(s) Chew daily PRN Gas          I&O's Summary    11 Jan 2024 07:01  -  12 Jan 2024 07:00  --------------------------------------------------------  IN: 1650 mL / OUT: 0 mL / NET: 1650 mL      Vital Signs Last 24 Hrs    T(F): 98.1 (12 Jan 2024 06:09), Max: 98.2 (11 Jan 2024 20:56)  HR: 62 (12 Jan 2024 06:09) (62 - 67)  BP: 132/83 (12 Jan 2024 06:09) (132/83 - 146/77)  RR: 17 (12 Jan 2024 06:09) (17 - 18)  SpO2: 97% (12 Jan 2024 06:09) (97% - 98%)  room air      PHYSICAL EXAM:  GENERAL: NAD,  HEAD:  Atraumatic, Normocephalic  EYES: EOMI, PERRLA, conjunctiva and sclera clear  NECK: Supple, No JVD  CHEST/LUNG: Clear to auscultation bilaterally; No wheeze  HEART: Regular rate and rhythm; No murmurs, rubs, or gallops  ABDOMEN: Soft, Nontender, Nondistended; Bowel sounds present  EXTREMITIES:  2+ Peripheral Pulses, No clubbing, cyanosis, or edema  PSYCH: AAOx3  NEUROLOGY: non-focal  SKIN: No rashes or lesions    LABS:                        14.9   9.78  )-----------( 192      ( 12 Jan 2024 06:31 )             45.0     01-12    142  |  106  |  8   ----------------------------<  120<H>  3.3<L>   |  25  |  0.69    Ca    8.8      12 Jan 2024 06:31  Phos  3.0     01-11  Mg     1.90     01-11    TPro  6.6  /  Alb  3.5  /  TBili  2.1<H>  /  DBili  x   /  AST  133<H>  /  ALT  470<H>  /  AlkPhos  250<H>  01-12          Care Discussed with Consultants/Other Providers:   Patient is a 48y old  Male who presents with a chief complaint of Acute pancreatitis (11 Jan 2024 09:07)      SUBJECTIVE / OVERNIGHT EVENTS:  Tolerated regular diet since 1/11/24  Ready for home  Reports of LFT and Mri given to patient for out patient f/u with MD at [facility].      MEDICATIONS  (STANDING):  chlorhexidine 2% Cloths 1 Application(s) Topical <User Schedule>  dextrose 5% + sodium chloride 0.9%. 1000 milliLiter(s) (75 mL/Hr) IV Continuous <Continuous>  dextrose 5%. 1000 milliLiter(s) (50 mL/Hr) IV Continuous <Continuous>  dextrose 5%. 1000 milliLiter(s) (100 mL/Hr) IV Continuous <Continuous>  dextrose 50% Injectable 25 Gram(s) IV Push once  dextrose 50% Injectable 25 Gram(s) IV Push once  dextrose 50% Injectable 12.5 Gram(s) IV Push once  enoxaparin Injectable 40 milliGRAM(s) SubCutaneous every 24 hours  famotidine Injectable 20 milliGRAM(s) IV Push every 12 hours  glucagon  Injectable 1 milliGRAM(s) IntraMuscular once  insulin lispro (ADMELOG) corrective regimen sliding scale   SubCutaneous every 6 hours  mupirocin 2% Ointment 1 Application(s) Topical every 12 hours  pantoprazole  Injectable 40 milliGRAM(s) IV Push once  polyethylene glycol 3350 17 Gram(s) Oral daily  potassium chloride   Powder 40 milliEquivalent(s) Oral once    MEDICATIONS  (PRN):  aluminum hydroxide/magnesium hydroxide/simethicone Suspension 30 milliLiter(s) Oral every 6 hours PRN Dyspepsia  dextrose Oral Gel 15 Gram(s) Oral once PRN Blood Glucose LESS THAN 70 milliGRAM(s)/deciliter  ibuprofen  Tablet. 400 milliGRAM(s) Oral every 6 hours PRN Mild Pain (1 - 3), Moderate Pain (4 - 6), Severe Pain (7 - 10)  ondansetron Injectable 4 milliGRAM(s) IV Push every 8 hours PRN Nausea and/or Vomiting  saline laxative (FLEET) Rectal Enema 1 Enema Rectal daily PRN constipation  senna 2 Tablet(s) Oral at bedtime PRN Constipation  simethicone 80 milliGRAM(s) Chew daily PRN Gas          I&O's Summary    11 Jan 2024 07:01  -  12 Jan 2024 07:00  --------------------------------------------------------  IN: 1650 mL / OUT: 0 mL / NET: 1650 mL      Vital Signs Last 24 Hrs    T(F): 98.1 (12 Jan 2024 06:09), Max: 98.2 (11 Jan 2024 20:56)  HR: 62 (12 Jan 2024 06:09) (62 - 67)  BP: 132/83 (12 Jan 2024 06:09) (132/83 - 146/77)  RR: 17 (12 Jan 2024 06:09) (17 - 18)  SpO2: 97% (12 Jan 2024 06:09) (97% - 98%)  room air      PHYSICAL EXAM:  GENERAL: NAD,  HEAD:  Atraumatic, Normocephalic  EYES: EOMI, PERRLA, conjunctiva and sclera clear  NECK: Supple, No JVD  CHEST/LUNG: Clear to auscultation bilaterally; No wheeze  HEART: Regular rate and rhythm; No murmurs, rubs, or gallops  ABDOMEN: Soft, Nontender, Nondistended; Bowel sounds present  EXTREMITIES:  2+ Peripheral Pulses, No clubbing, cyanosis, or edema  PSYCH: AAOx3  NEUROLOGY: non-focal  SKIN: No rashes or lesions    LABS:                        14.9   9.78  )-----------( 192      ( 12 Jan 2024 06:31 )             45.0     01-12    142  |  106  |  8   ----------------------------<  120<H>  3.3<L>   |  25  |  0.69    Ca    8.8      12 Jan 2024 06:31  Phos  3.0     01-11  Mg     1.90     01-11    TPro  6.6  /  Alb  3.5  /  TBili  2.1<H>  /  DBili  x   /  AST  133<H>  /  ALT  470<H>  /  AlkPhos  250<H>  01-12          Care Discussed with Consultants/Other Providers:

## 2024-01-12 NOTE — PROGRESS NOTE ADULT - PROBLEM SELECTOR PROBLEM 1
Acute pancreatitis

## 2024-01-12 NOTE — PROGRESS NOTE ADULT - PROBLEM SELECTOR PLAN 4
Pt with marked sinus dorian to 47 bpm on admission EKG. Pt HD stable and asymptomatic from bradycardia. Likely near pt's baseline resting HR, as pt noted to have HR in low 60s at [place].   - No AV blocks noted on admission EKG  - WNL  - Monitor VS q4hrs    1/9 HR 71, Observe Pt with marked sinus dorian to 47 bpm on admission EKG. Pt HD stable and asymptomatic from bradycardia. Likely near pt's baseline resting HR, as pt noted to have HR in low 60s at [place].   - No AV blocks noted on admission EKG  - WNL  - Monitor VS q4hrs    1/9 HR 71, Observe - C/w Xanax 1 mg qHS PRN for anxiety/sleep  - ISTOP in chart

## 2024-01-12 NOTE — PROGRESS NOTE ADULT - PROBLEM SELECTOR PLAN 5
Pt on Metformin  mg daily at home.  - Hold Metformin while inpatient  - Start low-dose ISS and FS checks q6hrs while NPO  - a1c 5.7, will do FS for now but if bothersome can dc now on clear liquid diet. plan to advance to full liquid diet with ensure in AM since pain now improving after EUS and ERCP 1/9/24.  1/11 Full liquid diet

## 2024-01-12 NOTE — PROGRESS NOTE ADULT - PROBLEM SELECTOR PLAN 1
Pt presenting with worsening epigastric pain x2 weeks, associated with nausea and poor PO intake. Lipase elevated to 1282. Most likely acute pancreatitis, though unclear etiology. Pt denies any current alcohol use. TG 76 on admission. IgG4 in Dec 2023 wnl. Possibly from nicotine vaping? unclear why Pt regressed/relapsed. Previous CT scan reviewed,  repeat CT does note mildly worsening pancreatitis with narrowing of ampulla, unclear from inflammation vs stricture   - S/p 2L bolus of NS in ED.   - c/w NPO and sips of water and IVF  -repeat CT does note mildly worsening pancreatitis with narrowing of ampulla, unclear from inflammation vs stricture   -change pain regimen to dilaudid 1mg q4h ---> q3 --->On  1/9 inc to 1.5 mg q3 prn  -encourage bowel regimen  repeat LFT rising---> hepatology consult requested.  1/9 EUS/ ERCP with stent to pancreatic duct done and removal of pancreatic stone, Sphincterotomy to biliaryducr with removal of stone  1/10 Tbili 6---> 1/11 Imroved LFT with T bili 2.7--> 2.1 Pt presenting with worsening epigastric pain x2 weeks, associated with nausea and poor PO intake. Lipase elevated to 1282. Most likely acute pancreatitis, though unclear etiology. Pt denies any current alcohol use. TG 76 on admission. IgG4 in Dec 2023 wnl. Possibly from nicotine vaping? unclear why Pt regressed/relapsed. Previous CT scan reviewed,  repeat CT does note mildly worsening pancreatitis with narrowing of ampulla, unclear from inflammation vs stricture   - S/p 2L bolus of NS in ED.   - c/w NPO and sips of water and IVF  -repeat CT does note mildly worsening pancreatitis with narrowing of ampulla, unclear from inflammation vs stricture   -change pain regimen to dilaudid 1mg q4h ---> q3 --->On  1/9 inc to 1.5 mg q3 prn  Pain medication d/c 1/12.  -encourage bowel regimen  repeat LFT rising---> hepatology consult requested.  1/9 EUS/ ERCP with stent to pancreatic duct done and removal of pancreatic stone, Sphincterotomy to biliaryducr with removal of stone  1/10 Tbili 6---> 1/11 Imroved LFT with T bili 2.7--> 2.1 Pt presenting with worsening epigastric pain x2 weeks, associated with nausea and poor PO intake. Lipase elevated to 1282. Most likely acute pancreatitis, though unclear etiology. Pt denies any current alcohol use. TG 76 on admission. IgG4 in Dec 2023 wnl. Possibly from nicotine vaping? unclear why Pt regressed/relapsed. Previous CT scan reviewed,  repeat CT does note mildly worsening pancreatitis with narrowing of ampulla, unclear from inflammation vs stricture   - S/p 2L bolus of NS in ED.   - c/w NPO and sips of water and IVF  -repeat CT does note mildly worsening pancreatitis with narrowing of ampulla, unclear from inflammation vs stricture   -change pain regimen to dilaudid 1mg q4h ---> q3 --->On  1/9 inc to 1.5 mg q3 prn  IV Pain medication d/c 1/11with starting regular diet--> Ibuprofen prn started  -encourage bowel regimen  repeat LFT rising---> hepatology consult requested.  1/9 EUS/ ERCP with stent to pancreatic duct done and removal of pancreatic stone, Sphincterotomy to biliaryducr with removal of stone  1/10 Tbili 6---> 1/11 Imroved LFT with T bili 2.7--> 2.1

## 2024-01-12 NOTE — PROGRESS NOTE ADULT - PROVIDER SPECIALTY LIST ADULT
Anesthesia
Hospitalist
Hospitalist
Gastroenterology
Gastroenterology
Hospitalist
Gastroenterology
Hospitalist
Gastroenterology
Gastroenterology
Hospitalist

## 2024-01-12 NOTE — PROGRESS NOTE ADULT - PROBLEM SELECTOR PLAN 2
Hepatology consult requested 1/8 1/9 GI did EUS and ERCP  Gi greatly appreciated  1/10 EUS as above. Tbili 6.3. f/u MRCP today and GI  1/11 Resolving LFT, T bili 2.7    ra< from: MR MRCP No Cont (01.10.24 @ 18:50) >    IMPRESSION:  Interval improvement in acute pancreatitis with normal caliber pancreatic   duct and biliary tree.    < end of copied text > Hepatology consult requested 1/8 1/9 GI did EUS and ERCP  Gi greatly appreciated  1/10 EUS as above. Tbili 6.3. f/u MRCP today and GI  1/11 Resolving LFT, T bili 2.7  1/12 T bili 2.1  ra< from: MR MRCP No Cont (01.10.24 @ 18:50) >    IMPRESSION:  Interval improvement in acute pancreatitis with normal caliber pancreatic   duct and biliary tree.  Out patient f/u GI and PCP

## 2024-01-12 NOTE — PROGRESS NOTE ADULT - PROBLEM SELECTOR PLAN 11
- DVT ppx: Lovenox SQ 40 mg daily   - Diet: NPO except for meds--> advance to [facility] at lunch    Discharge planning  Plan to advance to regular diet in AM  Out patient f/u GI and PCP  GI Greatly appreciated- agree with d/c  Home with out patient f/u with Gi/ pcp

## 2024-01-12 NOTE — PROGRESS NOTE ADULT - PROBLEM SELECTOR PLAN 9
Pt denies any history of COPD or asthma, although documented in pt's EMR that he has COPD and asthma. Pt not on any inhalers at home.  - Monitor respiratory status  - Supplemental O2 PRN - DVT ppx: Lovenox SQ 40 mg daily   - Diet: NPO except for meds--> advance to [facility] at lunch    Discharge planning  Plan to advance to regular diet in AM  Out patient f/u GI and PCP  GI Greatly appreciated- agree with d/c  Home with out patient f/u with Gi/ pcp

## 2024-01-15 LAB
SURGICAL PATHOLOGY STUDY: SIGNIFICANT CHANGE UP
SURGICAL PATHOLOGY STUDY: SIGNIFICANT CHANGE UP

## 2024-01-17 ENCOUNTER — NON-APPOINTMENT (OUTPATIENT)
Age: 49
End: 2024-01-17

## 2024-01-17 DIAGNOSIS — K83.1 OBSTRUCTION OF BILE DUCT: ICD-10-CM

## 2024-01-17 DIAGNOSIS — K86.89 OTHER SPECIFIED DISEASES OF PANCREAS: ICD-10-CM

## 2024-01-19 PROBLEM — Z87.898 PERSONAL HISTORY OF OTHER SPECIFIED CONDITIONS: Chronic | Status: ACTIVE | Noted: 2024-01-04

## 2024-01-19 PROBLEM — M06.9 RHEUMATOID ARTHRITIS, UNSPECIFIED: Chronic | Status: ACTIVE | Noted: 2024-01-04

## 2024-01-19 PROBLEM — J44.89 OTHER SPECIFIED CHRONIC OBSTRUCTIVE PULMONARY DISEASE: Chronic | Status: ACTIVE | Noted: 2024-01-04

## 2024-04-07 ENCOUNTER — EMERGENCY (EMERGENCY)
Facility: HOSPITAL | Age: 49
LOS: 1 days | Discharge: HOME | End: 2024-04-07
Attending: EMERGENCY MEDICINE | Admitting: EMERGENCY MEDICINE
Payer: COMMERCIAL

## 2024-04-07 VITALS
RESPIRATION RATE: 18 BRPM | OXYGEN SATURATION: 100 % | HEART RATE: 82 BPM | TEMPERATURE: 98 F | SYSTOLIC BLOOD PRESSURE: 127 MMHG | DIASTOLIC BLOOD PRESSURE: 66 MMHG

## 2024-04-07 VITALS
SYSTOLIC BLOOD PRESSURE: 157 MMHG | RESPIRATION RATE: 18 BRPM | HEART RATE: 85 BPM | OXYGEN SATURATION: 96 % | DIASTOLIC BLOOD PRESSURE: 83 MMHG | TEMPERATURE: 99 F

## 2024-04-07 LAB
ALBUMIN SERPL ELPH-MCNC: 4.6 G/DL — SIGNIFICANT CHANGE UP (ref 3.3–5)
ALP SERPL-CCNC: 78 U/L — SIGNIFICANT CHANGE UP (ref 40–120)
ALT FLD-CCNC: 26 U/L — SIGNIFICANT CHANGE UP (ref 4–41)
ANION GAP SERPL CALC-SCNC: 12 MMOL/L — SIGNIFICANT CHANGE UP (ref 7–14)
AST SERPL-CCNC: 21 U/L — SIGNIFICANT CHANGE UP (ref 4–40)
BASE EXCESS BLDV CALC-SCNC: 2.7 MMOL/L — SIGNIFICANT CHANGE UP (ref -2–3)
BASOPHILS # BLD AUTO: 0.06 K/UL — SIGNIFICANT CHANGE UP (ref 0–0.2)
BASOPHILS NFR BLD AUTO: 0.5 % — SIGNIFICANT CHANGE UP (ref 0–2)
BILIRUB SERPL-MCNC: 0.5 MG/DL — SIGNIFICANT CHANGE UP (ref 0.2–1.2)
BLOOD GAS VENOUS COMPREHENSIVE RESULT: SIGNIFICANT CHANGE UP
BUN SERPL-MCNC: 8 MG/DL — SIGNIFICANT CHANGE UP (ref 7–23)
CALCIUM SERPL-MCNC: 9.7 MG/DL — SIGNIFICANT CHANGE UP (ref 8.4–10.5)
CHLORIDE BLDV-SCNC: 103 MMOL/L — SIGNIFICANT CHANGE UP (ref 96–108)
CHLORIDE SERPL-SCNC: 104 MMOL/L — SIGNIFICANT CHANGE UP (ref 98–107)
CO2 BLDV-SCNC: 31.5 MMOL/L — HIGH (ref 22–26)
CO2 SERPL-SCNC: 26 MMOL/L — SIGNIFICANT CHANGE UP (ref 22–31)
CREAT SERPL-MCNC: 1 MG/DL — SIGNIFICANT CHANGE UP (ref 0.5–1.3)
EGFR: 92 ML/MIN/1.73M2 — SIGNIFICANT CHANGE UP
EOSINOPHIL # BLD AUTO: 0.02 K/UL — SIGNIFICANT CHANGE UP (ref 0–0.5)
EOSINOPHIL NFR BLD AUTO: 0.2 % — SIGNIFICANT CHANGE UP (ref 0–6)
GAS PNL BLDV: 137 MMOL/L — SIGNIFICANT CHANGE UP (ref 136–145)
GLUCOSE BLDV-MCNC: 90 MG/DL — SIGNIFICANT CHANGE UP (ref 70–99)
GLUCOSE SERPL-MCNC: 94 MG/DL — SIGNIFICANT CHANGE UP (ref 70–99)
HCO3 BLDV-SCNC: 30 MMOL/L — HIGH (ref 22–29)
HCT VFR BLD CALC: 47.8 % — SIGNIFICANT CHANGE UP (ref 39–50)
HCT VFR BLDA CALC: 49 % — SIGNIFICANT CHANGE UP (ref 39–51)
HGB BLD CALC-MCNC: 16.4 G/DL — SIGNIFICANT CHANGE UP (ref 12.6–17.4)
HGB BLD-MCNC: 15.8 G/DL — SIGNIFICANT CHANGE UP (ref 13–17)
IANC: 10.47 K/UL — HIGH (ref 1.8–7.4)
IMM GRANULOCYTES NFR BLD AUTO: 0.3 % — SIGNIFICANT CHANGE UP (ref 0–0.9)
LACTATE BLDV-MCNC: 1.5 MMOL/L — SIGNIFICANT CHANGE UP (ref 0.5–2)
LIDOCAIN IGE QN: 25 U/L — SIGNIFICANT CHANGE UP (ref 7–60)
LYMPHOCYTES # BLD AUTO: 1.93 K/UL — SIGNIFICANT CHANGE UP (ref 1–3.3)
LYMPHOCYTES # BLD AUTO: 14.8 % — SIGNIFICANT CHANGE UP (ref 13–44)
MAGNESIUM SERPL-MCNC: 2.1 MG/DL — SIGNIFICANT CHANGE UP (ref 1.6–2.6)
MCHC RBC-ENTMCNC: 27.3 PG — SIGNIFICANT CHANGE UP (ref 27–34)
MCHC RBC-ENTMCNC: 33.1 GM/DL — SIGNIFICANT CHANGE UP (ref 32–36)
MCV RBC AUTO: 82.7 FL — SIGNIFICANT CHANGE UP (ref 80–100)
MONOCYTES # BLD AUTO: 0.53 K/UL — SIGNIFICANT CHANGE UP (ref 0–0.9)
MONOCYTES NFR BLD AUTO: 4.1 % — SIGNIFICANT CHANGE UP (ref 2–14)
NEUTROPHILS # BLD AUTO: 10.47 K/UL — HIGH (ref 1.8–7.4)
NEUTROPHILS NFR BLD AUTO: 80.1 % — HIGH (ref 43–77)
NRBC # BLD: 0 /100 WBCS — SIGNIFICANT CHANGE UP (ref 0–0)
NRBC # FLD: 0 K/UL — SIGNIFICANT CHANGE UP (ref 0–0)
PCO2 BLDV: 54 MMHG — SIGNIFICANT CHANGE UP (ref 42–55)
PH BLDV: 7.35 — SIGNIFICANT CHANGE UP (ref 7.32–7.43)
PHOSPHATE SERPL-MCNC: 3.9 MG/DL — SIGNIFICANT CHANGE UP (ref 2.5–4.5)
PLATELET # BLD AUTO: 225 K/UL — SIGNIFICANT CHANGE UP (ref 150–400)
PO2 BLDV: 25 MMHG — SIGNIFICANT CHANGE UP (ref 25–45)
POTASSIUM BLDV-SCNC: 4.2 MMOL/L — SIGNIFICANT CHANGE UP (ref 3.5–5.1)
POTASSIUM SERPL-MCNC: 4.1 MMOL/L — SIGNIFICANT CHANGE UP (ref 3.5–5.3)
POTASSIUM SERPL-SCNC: 4.1 MMOL/L — SIGNIFICANT CHANGE UP (ref 3.5–5.3)
PROT SERPL-MCNC: 7.6 G/DL — SIGNIFICANT CHANGE UP (ref 6–8.3)
RBC # BLD: 5.78 M/UL — SIGNIFICANT CHANGE UP (ref 4.2–5.8)
RBC # FLD: 13.3 % — SIGNIFICANT CHANGE UP (ref 10.3–14.5)
SAO2 % BLDV: 34.7 % — LOW (ref 67–88)
SODIUM SERPL-SCNC: 142 MMOL/L — SIGNIFICANT CHANGE UP (ref 135–145)
TRIGL SERPL-MCNC: 76 MG/DL — SIGNIFICANT CHANGE UP
WBC # BLD: 13.05 K/UL — HIGH (ref 3.8–10.5)
WBC # FLD AUTO: 13.05 K/UL — HIGH (ref 3.8–10.5)

## 2024-04-07 PROCEDURE — 74177 CT ABD & PELVIS W/CONTRAST: CPT | Mod: 26,MC

## 2024-04-07 PROCEDURE — 99285 EMERGENCY DEPT VISIT HI MDM: CPT

## 2024-04-07 RX ORDER — HYDROMORPHONE HYDROCHLORIDE 2 MG/ML
1 INJECTION INTRAMUSCULAR; INTRAVENOUS; SUBCUTANEOUS ONCE
Refills: 0 | Status: DISCONTINUED | OUTPATIENT
Start: 2024-04-07 | End: 2024-04-07

## 2024-04-07 RX ORDER — SODIUM CHLORIDE 9 MG/ML
1000 INJECTION INTRAMUSCULAR; INTRAVENOUS; SUBCUTANEOUS ONCE
Refills: 0 | Status: COMPLETED | OUTPATIENT
Start: 2024-04-07 | End: 2024-04-07

## 2024-04-07 RX ADMIN — SODIUM CHLORIDE 1000 MILLILITER(S): 9 INJECTION INTRAMUSCULAR; INTRAVENOUS; SUBCUTANEOUS at 16:55

## 2024-04-07 RX ADMIN — HYDROMORPHONE HYDROCHLORIDE 1 MILLIGRAM(S): 2 INJECTION INTRAMUSCULAR; INTRAVENOUS; SUBCUTANEOUS at 16:55

## 2024-04-07 RX ADMIN — HYDROMORPHONE HYDROCHLORIDE 1 MILLIGRAM(S): 2 INJECTION INTRAMUSCULAR; INTRAVENOUS; SUBCUTANEOUS at 19:39

## 2024-04-07 NOTE — ED PROVIDER NOTE - NSFOLLOWUPINSTRUCTIONS_ED_ALL_ED_FT
you were seen in the emergency department for evaluation of abdominal pain, we checked bloodwork and CT scan of the abdomen, please see all results below.  Please review these results with your primary care physician to establish any follow ups as required.  Please follow up with your primary care physician within 1-3 days for continued care and evaluation.    You can take over the counter Tylenol up to 1000mg every 6 hours as needed for pain and/or over the counter Motrin up to 600mg every 6 hours as needed for pain, please follow the instructions on the bottle.  You can take zofran (antinausea) medication 4mg every 8 hours as needed for nausea.    PLEASE FOLLOW UP GASTROENTEROLOGY FOR CONTINUED MANAGEMENT OF BILIARY/PANCREATIC STENT.  Please look out for a call from the hospital to arrange an appointment, if you do not receive a call, please use the phone number above to arrange your own appointment.    Please RETURN TO THE EMERGENCY DEPARTMENT OR SEEK IMMEDIATE MEDICAL ATTENTION if you experience any new or worsening symptoms, including but not limited to: fevers, chills, persistent nausea or vomiting or diarrhea, significant fatigue, significantly decreased physical activity, inability to stand or walk on your own, inability to hold down foods or fluids because of nausea or vomiting, fainting, severe headaches, vision changes, chest pain, shortness of breath, bloody urine, coughing up or throwing up blood, bloody stools, incontinence of urine or stool.

## 2024-04-07 NOTE — ED ADULT NURSE NOTE - OBJECTIVE STATEMENT
pt received to intake. pt is AxO 3 and ambulatory. pt c/o diffuse abdominal pain radiating to his back x 4days. pt endorses x of bilary stent placed x 1 year ago, and pancreatitis. pt respirations even and unlabored. abdomen is soft, tender, and nondistended. pt denies headaches, dizziness, n/v/d, SOB, chest pain, or fever like symptoms. pt has a  20g tot he left AC. pt labs obtained and sent ot the lab., pt medicated as prescribed. Plan of care ongoing.

## 2024-04-07 NOTE — ED PROVIDER NOTE - PATIENT PORTAL LINK FT
You can access the FollowMyHealth Patient Portal offered by [facility] by registering at the following website: [email or web address]. By joining [facility]’s FollowMyHealth portal, you will also be able to view your health information using other applications (apps) compatible with our system.

## 2024-04-07 NOTE — ED PROVIDER NOTE - PROGRESS NOTE DETAILS
Kristian LEDEZMA), PGY-1: Patient reassessed at the bedside after receiving second dose of Dilaudid, reports improvement in pain, blood work and CAT scan unremarkable, lipase negative, CT scan showing no obstruction or infection around the stent, patient stable for discharge home with outpatient follow-up, all results discussed with patient, patient stating he is having trouble making an outpatient appointment for stent removal, will put pt in for discharge lounge to help arrange follow up.  Pt has zofran at home he can take for nausea.

## 2024-04-07 NOTE — ED PROVIDER NOTE - ATTENDING CONTRIBUTION TO CARE
Dr. Cam:  I have personally performed a face to face bedside history and physical examination of this patient. I have discussed the history, examination, review of systems, assessment and plan of management with the resident. I have reviewed the electronic medical record and amended it to reflect my history, review of systems, physical exam, assessment and plan.    48M h/o recurrent pancreatitis, rheumatoid arthritis not on meds, NIDDM, anxiety, remote alcohol use disorder, presents with worsening epigastric pain radiating to back over past 4 days.  Feels somewhat similar to previous pancreatitis.  +Associated nausea and decreased PO intake.  Pt had admission 1/2024 for recurrent pancreatitis secondary to stone in pancreatic duct s/p ERCP 1/9 and stent to PD and sphincterotomy of biliary duct.  ROS otherwise negative today, except for 40lb weight loss since Jan 2024.    Exam:  - nad  - rrr  - ctab   -abd soft, TTP epigastric area    A/P  - abd pain, eval for recurrent pancreatitis vs other pathology  - cbc, cmp, lipase, vbg, CT abd/pelvis, pain control

## 2024-04-09 ENCOUNTER — APPOINTMENT (OUTPATIENT)
Dept: GASTROENTEROLOGY | Facility: HOSPITAL | Age: 49
End: 2024-04-09

## 2024-06-12 NOTE — PATIENT PROFILE ADULT - FALL HARM RISK - ATTEMPT OOB
Render In Strict Bullet Format?: No Plan: Follow up on 1 month\\nSignificant debridement/pairing with 15 blade again today to remaining VV Discontinue Regimen: -\\n- pharmacy compounding accessory: Fluorouracil 2% + Salicylic Acid 17% : Apply to warts on feet every night. Apply bandage. Wash hands after application. Continue Regimen: -\\n\\n- cimetidine 400 mg tablet : Take one tab BID x 3 months Detail Level: Zone Initiate Treatment: -\\n- Wart Thwart pen ( 60 clicks) cream : AAA QHS as tolerated (40% sal acid + 6% effudex) No

## 2024-09-26 NOTE — H&P ADULT - ATTENDING COMMENTS
There are no Wet Read(s) to document.
Vital Signs Last 24 Hrs  T(C): 36.6 (18 Dec 2023 01:49), Max: 36.7 (17 Dec 2023 20:54)  T(F): 97.8 (18 Dec 2023 01:49), Max: 98 (17 Dec 2023 20:54)  HR: 57 (18 Dec 2023 01:49) (57 - 70)  BP: 112/71 (18 Dec 2023 01:49) (112/71 - 151/82)  RR: 18 (18 Dec 2023 01:49) (18 - 19)  SpO2: 95% (18 Dec 2023 01:49) (95% - 98%)  Parameters below as of 18 Dec 2023 01:49  Patient On (Oxygen Delivery Method): room air    Labs  wbc - 18k with granulocytosis  hgb - 17.2 / 52.3  chem - unremarkable  Lipase - 765    CT abdomen  Mild acute interstitial pancreatitis with secondary inflammation of the   duodenum.    Impression  - 48 year old man with hx of prior hx of recurrent alcoholic pancreatitis no longer an active alcohol user here with clinical, lab and imaging findings keeping with acute pancreatitis. OF note, similar presentation in 2017 of unclear etiology.    Problems  # Acute pancreatitis  # DM2  # COPD   # rheumatoid arthritis    Plan   Admit to Medicine   NPO, pain control with step ladder approach   Aggressive IVF hydration with LR @ 250cc/hr  Etiology unclear. Check triglyceride levels   Med reconciliation   Resume home meds

## (undated) DEVICE — DENTURE CUP PINK

## (undated) DEVICE — SYR LUER LOK 3CC

## (undated) DEVICE — UNDERPAD LINEN SAVER 17 X 24"

## (undated) DEVICE — DRSG CURITY GAUZE SPONGE 4 X 4" 12-PLY NON-STERILE

## (undated) DEVICE — ELCTR ECG CONDUCTIVE ADHESIVE

## (undated) DEVICE — INJ SYS RAP REFIL

## (undated) DEVICE — SYR LUER LOK 10CC

## (undated) DEVICE — SOL INJ NS 0.9% 500ML 1-PORT

## (undated) DEVICE — PACK IV START WITH CHG

## (undated) DEVICE — BASIN EMESIS 10IN GRADUATED MAUVE

## (undated) DEVICE — NDL HYPO SAFE 22G X 1" (BLACK)

## (undated) DEVICE — DRSG 2X2

## (undated) DEVICE — GOWN LG

## (undated) DEVICE — SENSOR O2 FINGER ADULT

## (undated) DEVICE — SOL IRR POUR NS 0.9% 500ML

## (undated) DEVICE — LINE BREATHE SAMPLNG

## (undated) DEVICE — CATH IV SAFE BC 22G X 1" (BLUE)

## (undated) DEVICE — DRSG BANDAID 0.75X3"

## (undated) DEVICE — OMNIPAQUE 300  30ML

## (undated) DEVICE — SALIVA EJECTOR (BLUE)

## (undated) DEVICE — TUBING SUCTION NONCONDUCTIVE 6MM X 12FT

## (undated) DEVICE — BITE BLOCK ADULT 20 X 27MM (GREEN)